# Patient Record
Sex: MALE | Race: WHITE | NOT HISPANIC OR LATINO | Employment: OTHER | RURAL
[De-identification: names, ages, dates, MRNs, and addresses within clinical notes are randomized per-mention and may not be internally consistent; named-entity substitution may affect disease eponyms.]

---

## 2017-04-18 RX ORDER — TRAZODONE HYDROCHLORIDE 50 MG/1
50 TABLET ORAL NIGHTLY
Qty: 180 TABLET | Refills: 1 | Status: SHIPPED | OUTPATIENT
Start: 2017-04-18 | End: 2017-10-09 | Stop reason: SDUPTHER

## 2017-05-05 RX ORDER — ATORVASTATIN CALCIUM 40 MG/1
40 TABLET, FILM COATED ORAL DAILY
Qty: 90 TABLET | Refills: 1 | Status: SHIPPED | OUTPATIENT
Start: 2017-05-05 | End: 2017-10-26 | Stop reason: SDUPTHER

## 2017-05-19 ENCOUNTER — LAB (OUTPATIENT)
Dept: FAMILY MEDICINE CLINIC | Facility: CLINIC | Age: 57
End: 2017-05-19

## 2017-05-19 DIAGNOSIS — E78.2 MIXED HYPERLIPIDEMIA: Primary | ICD-10-CM

## 2017-05-20 LAB
ALBUMIN SERPL-MCNC: 4.6 G/DL (ref 3.5–5.5)
ALBUMIN/GLOB SERPL: 2 {RATIO} (ref 1.2–2.2)
ALP SERPL-CCNC: 79 IU/L (ref 39–117)
ALT SERPL-CCNC: 22 IU/L (ref 0–44)
AST SERPL-CCNC: 30 IU/L (ref 0–40)
BILIRUB SERPL-MCNC: 0.8 MG/DL (ref 0–1.2)
BUN SERPL-MCNC: 16 MG/DL (ref 6–24)
BUN/CREAT SERPL: 18 (ref 9–20)
CALCIUM SERPL-MCNC: 8.9 MG/DL (ref 8.7–10.2)
CHLORIDE SERPL-SCNC: 102 MMOL/L (ref 96–106)
CHOLEST SERPL-MCNC: 122 MG/DL (ref 100–199)
CO2 SERPL-SCNC: 21 MMOL/L (ref 18–29)
CREAT SERPL-MCNC: 0.91 MG/DL (ref 0.76–1.27)
GLOBULIN SER CALC-MCNC: 2.3 G/DL (ref 1.5–4.5)
GLUCOSE SERPL-MCNC: 88 MG/DL (ref 65–99)
HDLC SERPL-MCNC: 32 MG/DL
LDLC SERPL CALC-MCNC: 70 MG/DL (ref 0–99)
Lab: NORMAL
POTASSIUM SERPL-SCNC: 4.4 MMOL/L (ref 3.5–5.2)
PROT SERPL-MCNC: 6.9 G/DL (ref 6–8.5)
SODIUM SERPL-SCNC: 142 MMOL/L (ref 134–144)
TRIGL SERPL-MCNC: 102 MG/DL (ref 0–149)
VLDLC SERPL CALC-MCNC: 20 MG/DL (ref 5–40)

## 2017-06-19 RX ORDER — SILDENAFIL CITRATE 100 MG
TABLET ORAL
Qty: 6 TABLET | Refills: 3 | Status: SHIPPED | OUTPATIENT
Start: 2017-06-19

## 2017-06-23 RX ORDER — NITROGLYCERIN 0.4 MG/1
TABLET SUBLINGUAL
Qty: 25 TABLET | Refills: 2 | Status: SHIPPED | OUTPATIENT
Start: 2017-06-23 | End: 2018-12-26 | Stop reason: SDUPTHER

## 2017-07-31 PROBLEM — I25.10 CAD IN NATIVE ARTERY: Status: ACTIVE | Noted: 2017-07-31

## 2017-07-31 PROBLEM — E78.5 HYPERLIPIDEMIA, MILD: Status: ACTIVE | Noted: 2017-07-31

## 2017-07-31 PROBLEM — I10 BENIGN ESSENTIAL HYPERTENSION: Status: ACTIVE | Noted: 2017-07-31

## 2017-07-31 PROBLEM — Z98.61 HISTORY OF PTCA: Status: ACTIVE | Noted: 2017-07-31

## 2017-07-31 PROBLEM — F17.200 TOBACCO USE DISORDER: Status: ACTIVE | Noted: 2017-07-31

## 2017-07-31 PROBLEM — I25.2 H/O ACUTE MYOCARDIAL INFARCTION: Status: ACTIVE | Noted: 2017-07-31

## 2017-07-31 RX ORDER — CAPTOPRIL 25 MG/1
25 TABLET ORAL 2 TIMES DAILY
COMMUNITY
End: 2018-03-09 | Stop reason: SDUPTHER

## 2017-07-31 RX ORDER — ASPIRIN 81 MG/1
81 TABLET ORAL DAILY
COMMUNITY

## 2017-07-31 RX ORDER — ASCORBIC ACID 1000 MG
40 TABLET ORAL DAILY
COMMUNITY

## 2017-08-09 ENCOUNTER — OFFICE VISIT (OUTPATIENT)
Dept: CARDIOLOGY | Facility: CLINIC | Age: 57
End: 2017-08-09

## 2017-08-09 VITALS
WEIGHT: 217 LBS | HEART RATE: 63 BPM | HEIGHT: 74 IN | DIASTOLIC BLOOD PRESSURE: 70 MMHG | SYSTOLIC BLOOD PRESSURE: 118 MMHG | BODY MASS INDEX: 27.85 KG/M2

## 2017-08-09 DIAGNOSIS — E78.5 HYPERLIPIDEMIA, MILD: ICD-10-CM

## 2017-08-09 DIAGNOSIS — I25.10 CAD IN NATIVE ARTERY: Primary | ICD-10-CM

## 2017-08-09 DIAGNOSIS — I10 BENIGN ESSENTIAL HYPERTENSION: ICD-10-CM

## 2017-08-09 PROCEDURE — 99213 OFFICE O/P EST LOW 20 MIN: CPT | Performed by: INTERNAL MEDICINE

## 2017-08-09 PROCEDURE — 93000 ELECTROCARDIOGRAM COMPLETE: CPT | Performed by: INTERNAL MEDICINE
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
## 2017-10-09 RX ORDER — TRAZODONE HYDROCHLORIDE 50 MG/1
TABLET ORAL
Qty: 180 TABLET | Refills: 1 | Status: SHIPPED | OUTPATIENT
Start: 2017-10-09 | End: 2018-06-24 | Stop reason: SDUPTHER

## 2017-10-17 ENCOUNTER — LAB (OUTPATIENT)
Dept: FAMILY MEDICINE CLINIC | Facility: CLINIC | Age: 57
End: 2017-10-17

## 2017-10-17 DIAGNOSIS — Z00.00 ROUTINE GENERAL MEDICAL EXAMINATION AT A HEALTH CARE FACILITY: Primary | ICD-10-CM

## 2017-10-17 DIAGNOSIS — E55.9 VITAMIN D INSUFFICIENCY: ICD-10-CM

## 2017-10-18 ENCOUNTER — OFFICE VISIT (OUTPATIENT)
Dept: FAMILY MEDICINE CLINIC | Facility: CLINIC | Age: 57
End: 2017-10-18

## 2017-10-18 VITALS
BODY MASS INDEX: 28.23 KG/M2 | DIASTOLIC BLOOD PRESSURE: 68 MMHG | SYSTOLIC BLOOD PRESSURE: 110 MMHG | HEIGHT: 74 IN | OXYGEN SATURATION: 95 % | WEIGHT: 220 LBS | HEART RATE: 67 BPM | TEMPERATURE: 98.6 F

## 2017-10-18 DIAGNOSIS — Z00.00 ANNUAL PHYSICAL EXAM: Primary | ICD-10-CM

## 2017-10-18 DIAGNOSIS — Z23 NEED FOR INFLUENZA VACCINATION: ICD-10-CM

## 2017-10-18 DIAGNOSIS — Z12.12 SCREENING FOR MALIGNANT NEOPLASM OF THE RECTUM: ICD-10-CM

## 2017-10-18 DIAGNOSIS — Z23 NEED FOR PROPHYLACTIC VACCINATION WITH COMBINED DIPHTHERIA-TETANUS-PERTUSSIS (DTP) VACCINE: ICD-10-CM

## 2017-10-18 LAB
25(OH)D3+25(OH)D2 SERPL-MCNC: 36.6 NG/ML (ref 30–100)
ALBUMIN SERPL-MCNC: 4.7 G/DL (ref 3.5–5)
ALBUMIN/GLOB SERPL: 1.8 G/DL (ref 1.1–2.5)
ALP SERPL-CCNC: 95 U/L (ref 24–120)
ALT SERPL-CCNC: 51 U/L (ref 0–54)
AST SERPL-CCNC: 38 U/L (ref 7–45)
BASOPHILS # BLD AUTO: 0.01 10*3/MM3 (ref 0–0.2)
BASOPHILS NFR BLD AUTO: 0.2 % (ref 0–2)
BILIRUB BLD-MCNC: NEGATIVE MG/DL
BILIRUB SERPL-MCNC: 0.8 MG/DL (ref 0.1–1)
BUN SERPL-MCNC: 16 MG/DL (ref 5–21)
BUN/CREAT SERPL: 15.1 (ref 7–25)
CALCIUM SERPL-MCNC: 9.4 MG/DL (ref 8.4–10.4)
CHLORIDE SERPL-SCNC: 103 MMOL/L (ref 98–110)
CHOLEST SERPL-MCNC: 151 MG/DL (ref 130–200)
CLARITY, POC: CLEAR
CO2 SERPL-SCNC: 29 MMOL/L (ref 24–31)
COLOR UR: YELLOW
CREAT SERPL-MCNC: 1.06 MG/DL (ref 0.5–1.4)
EOSINOPHIL # BLD AUTO: 0.09 10*3/MM3 (ref 0–0.7)
EOSINOPHIL NFR BLD AUTO: 1.4 % (ref 0–4)
ERYTHROCYTE [DISTWIDTH] IN BLOOD BY AUTOMATED COUNT: 13.7 % (ref 12–15)
GLOBULIN SER CALC-MCNC: 2.6 GM/DL
GLUCOSE SERPL-MCNC: 90 MG/DL (ref 70–100)
GLUCOSE UR STRIP-MCNC: NEGATIVE MG/DL
HCT VFR BLD AUTO: 46.3 % (ref 40–52)
HCV AB S/CO SERPL IA: <0.1 S/CO RATIO (ref 0–0.9)
HDLC SERPL-MCNC: 33 MG/DL
HGB BLD-MCNC: 15.4 G/DL (ref 14–18)
IMM GRANULOCYTES # BLD: 0.03 10*3/MM3 (ref 0–0.03)
IMM GRANULOCYTES NFR BLD: 0.5 % (ref 0–5)
KETONES UR QL: NEGATIVE
LDLC SERPL CALC-MCNC: 81 MG/DL (ref 0–99)
LEUKOCYTE EST, POC: NEGATIVE
LYMPHOCYTES # BLD AUTO: 1.53 10*3/MM3 (ref 0.72–4.86)
LYMPHOCYTES NFR BLD AUTO: 23.8 % (ref 15–45)
MCH RBC QN AUTO: 30.7 PG (ref 28–32)
MCHC RBC AUTO-ENTMCNC: 33.3 G/DL (ref 33–36)
MCV RBC AUTO: 92.4 FL (ref 82–95)
MONOCYTES # BLD AUTO: 0.58 10*3/MM3 (ref 0.19–1.3)
MONOCYTES NFR BLD AUTO: 9 % (ref 4–12)
NEUTROPHILS # BLD AUTO: 4.2 10*3/MM3 (ref 1.87–8.4)
NEUTROPHILS NFR BLD AUTO: 65.1 % (ref 39–78)
NITRITE UR-MCNC: NEGATIVE MG/ML
PH UR: 5 [PH] (ref 5–8)
PLATELET # BLD AUTO: 152 10*3/MM3 (ref 130–400)
POTASSIUM SERPL-SCNC: 5.2 MMOL/L (ref 3.5–5.3)
PROT SERPL-MCNC: 7.3 G/DL (ref 6.3–8.7)
PROT UR STRIP-MCNC: NEGATIVE MG/DL
PSA SERPL-MCNC: 0.63 NG/ML (ref 0–4)
RBC # BLD AUTO: 5.01 10*6/MM3 (ref 4.8–5.9)
RBC # UR STRIP: NEGATIVE /UL
SODIUM SERPL-SCNC: 142 MMOL/L (ref 135–145)
SP GR UR: 1.02 (ref 1–1.03)
T4 FREE SERPL-MCNC: 0.8 NG/DL (ref 0.78–2.19)
TRIGL SERPL-MCNC: 187 MG/DL (ref 0–149)
TSH SERPL DL<=0.005 MIU/L-ACNC: 2.65 MIU/ML (ref 0.47–4.68)
UROBILINOGEN UR QL: NORMAL
VLDLC SERPL CALC-MCNC: 37.4 MG/DL
WBC # BLD AUTO: 6.44 10*3/MM3 (ref 4.8–10.8)

## 2017-10-18 PROCEDURE — 90630 INFLUENZA VAC INTRADERMAL QUADRIVALENT: CPT | Performed by: FAMILY MEDICINE

## 2017-10-18 PROCEDURE — 81003 URINALYSIS AUTO W/O SCOPE: CPT | Performed by: FAMILY MEDICINE

## 2017-10-18 PROCEDURE — 90471 IMMUNIZATION ADMIN: CPT | Performed by: FAMILY MEDICINE

## 2017-10-18 PROCEDURE — 90715 TDAP VACCINE 7 YRS/> IM: CPT | Performed by: FAMILY MEDICINE

## 2017-10-18 PROCEDURE — 99396 PREV VISIT EST AGE 40-64: CPT | Performed by: FAMILY MEDICINE

## 2017-10-18 PROCEDURE — 96372 THER/PROPH/DIAG INJ SC/IM: CPT | Performed by: FAMILY MEDICINE

## 2017-10-18 NOTE — PATIENT INSTRUCTIONS

## 2017-10-18 NOTE — PROGRESS NOTES
Subjective   Gregorio Callahan is a 57 y.o. male.     Hypertension   This is a chronic problem. The current episode started more than 1 year ago. The problem is unchanged. The problem is controlled. Pertinent negatives include no chest pain. There are no associated agents to hypertension. Risk factors for coronary artery disease include dyslipidemia, male gender and sedentary lifestyle. Past treatments include ACE inhibitors and beta blockers. The current treatment provides significant improvement. Compliance problems include diet.  Hypertensive end-organ damage includes CAD/MI.        The following portions of the patient's history were reviewed and updated as appropriate: allergies, current medications, past family history, past medical history, past social history, past surgical history and problem list.    Review of Systems   Cardiovascular: Negative for chest pain and leg swelling.   Genitourinary: Negative for difficulty urinating.        Erectile dysfunction   All other systems reviewed and are negative.      Objective   Physical Exam   Constitutional: He is oriented to person, place, and time. He appears well-developed and well-nourished.   HENT:   Right Ear: External ear normal.   Left Ear: External ear normal.   Mouth/Throat: Oropharynx is clear and moist.   Eyes: Conjunctivae and EOM are normal. Pupils are equal, round, and reactive to light.   Neck: No thyromegaly present.   The carotid pulses are good   Cardiovascular: Normal rate and regular rhythm.    Pulmonary/Chest: Effort normal and breath sounds normal.   Abdominal: Soft. Bowel sounds are normal. He exhibits no mass.   Genitourinary: Rectum normal, prostate normal and penis normal.   Genitourinary Comments: Testicles normal no inguinal hernias or adenopathy-prostate normal no nodules   Musculoskeletal: He exhibits no edema.   Lymphadenopathy:     He has no cervical adenopathy.   Neurological: He is alert and oriented to person, place, and time.   Skin:  Skin is warm and dry.   Psychiatric: He has a normal mood and affect. His behavior is normal. Judgment and thought content normal.   Nursing note and vitals reviewed.      Assessment/Plan   Problems Addressed this Visit     None      Visit Diagnoses     Annual physical exam    -  Primary    Need for prophylactic vaccination with combined diphtheria-tetanus-pertussis (DTP) vaccine        Relevant Orders    Tdap Vaccine Greater Than or Equal To 8yo IM    Screening for malignant neoplasm of the rectum        Relevant Orders    POC Occult Blood X 3, Stool            Plan-above

## 2017-10-26 RX ORDER — ATORVASTATIN CALCIUM 40 MG/1
TABLET, FILM COATED ORAL
Qty: 90 TABLET | Refills: 1 | Status: SHIPPED | OUTPATIENT
Start: 2017-10-26 | End: 2018-04-19 | Stop reason: SDUPTHER

## 2017-11-29 ENCOUNTER — OFFICE VISIT (OUTPATIENT)
Dept: FAMILY MEDICINE CLINIC | Facility: CLINIC | Age: 57
End: 2017-11-29

## 2017-11-29 VITALS
DIASTOLIC BLOOD PRESSURE: 64 MMHG | HEIGHT: 74 IN | HEART RATE: 75 BPM | TEMPERATURE: 98.4 F | OXYGEN SATURATION: 96 % | BODY MASS INDEX: 28.88 KG/M2 | WEIGHT: 225 LBS | SYSTOLIC BLOOD PRESSURE: 116 MMHG

## 2017-11-29 DIAGNOSIS — R07.89 OTHER CHEST PAIN: Primary | ICD-10-CM

## 2017-11-29 DIAGNOSIS — R10.12 LUQ ABDOMINAL PAIN: ICD-10-CM

## 2017-11-29 LAB
BILIRUB BLD-MCNC: NEGATIVE MG/DL
CLARITY, POC: CLEAR
COLOR UR: YELLOW
GLUCOSE UR STRIP-MCNC: NEGATIVE MG/DL
KETONES UR QL: NEGATIVE
LEUKOCYTE EST, POC: NEGATIVE
NITRITE UR-MCNC: NEGATIVE MG/ML
PH UR: 5.5 [PH] (ref 5–8)
PROT UR STRIP-MCNC: NEGATIVE MG/DL
RBC # UR STRIP: NEGATIVE /UL
SP GR UR: 1.02 (ref 1–1.03)
UROBILINOGEN UR QL: NORMAL

## 2017-11-29 PROCEDURE — 81003 URINALYSIS AUTO W/O SCOPE: CPT | Performed by: FAMILY MEDICINE

## 2017-11-29 PROCEDURE — 99213 OFFICE O/P EST LOW 20 MIN: CPT | Performed by: FAMILY MEDICINE

## 2017-11-29 RX ORDER — HYDROCODONE BITARTRATE AND ACETAMINOPHEN 7.5; 325 MG/1; MG/1
1 TABLET ORAL EVERY 6 HOURS PRN
COMMUNITY
End: 2017-12-06

## 2017-11-29 RX ORDER — IBUPROFEN 200 MG
200 TABLET ORAL EVERY 6 HOURS PRN
COMMUNITY
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
## 2017-11-30 DIAGNOSIS — R10.12 LUQ ABDOMINAL PAIN: ICD-10-CM

## 2017-11-30 DIAGNOSIS — R07.89 OTHER CHEST PAIN: ICD-10-CM

## 2017-12-06 ENCOUNTER — OFFICE VISIT (OUTPATIENT)
Dept: FAMILY MEDICINE CLINIC | Facility: CLINIC | Age: 57
End: 2017-12-06

## 2017-12-06 VITALS
SYSTOLIC BLOOD PRESSURE: 118 MMHG | HEIGHT: 74 IN | OXYGEN SATURATION: 95 % | WEIGHT: 227.2 LBS | DIASTOLIC BLOOD PRESSURE: 76 MMHG | HEART RATE: 77 BPM | BODY MASS INDEX: 29.16 KG/M2 | TEMPERATURE: 98.2 F

## 2017-12-06 DIAGNOSIS — R52 PAIN: Primary | ICD-10-CM

## 2017-12-06 PROCEDURE — 99213 OFFICE O/P EST LOW 20 MIN: CPT | Performed by: FAMILY MEDICINE
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
## 2017-12-21 RX ORDER — METOPROLOL TARTRATE 50 MG/1
TABLET, FILM COATED ORAL
Qty: 90 TABLET | Refills: 3 | Status: SHIPPED | OUTPATIENT
Start: 2017-12-21 | End: 2019-01-14 | Stop reason: SDUPTHER

## 2018-01-05 ENCOUNTER — OFFICE VISIT (OUTPATIENT)
Dept: FAMILY MEDICINE CLINIC | Facility: CLINIC | Age: 58
End: 2018-01-05

## 2018-01-05 VITALS
BODY MASS INDEX: 28.21 KG/M2 | DIASTOLIC BLOOD PRESSURE: 70 MMHG | HEART RATE: 77 BPM | SYSTOLIC BLOOD PRESSURE: 112 MMHG | WEIGHT: 219.8 LBS | TEMPERATURE: 98 F | HEIGHT: 74 IN | OXYGEN SATURATION: 97 %

## 2018-01-05 DIAGNOSIS — J40 BRONCHITIS: Primary | ICD-10-CM

## 2018-01-05 PROCEDURE — 99213 OFFICE O/P EST LOW 20 MIN: CPT | Performed by: FAMILY MEDICINE

## 2018-01-05 RX ORDER — AZITHROMYCIN 250 MG/1
TABLET, FILM COATED ORAL
Qty: 6 TABLET | Refills: 0 | Status: SHIPPED | OUTPATIENT
Start: 2018-01-05 | End: 2018-01-26

## 2018-01-05 RX ORDER — NEOMYCIN SULFATE, POLYMYXIN B SULFATE AND HYDROCORTISONE 10; 3.5; 1 MG/ML; MG/ML; [USP'U]/ML
3 SUSPENSION/ DROPS AURICULAR (OTIC) 4 TIMES DAILY
Qty: 10 ML | Refills: 0 | Status: SHIPPED | OUTPATIENT
Start: 2018-01-05 | End: 2018-01-26

## 2018-01-05 NOTE — PROGRESS NOTES
Subjective   Gregorio Callahan is a 57 y.o. male.     Cough   This is a new problem. The current episode started 1 to 4 weeks ago. The problem has been unchanged. The problem occurs hourly. The cough is productive of purulent sputum. Associated symptoms include chest pain. Pertinent negatives include no shortness of breath. Associated symptoms comments: Flu 1 week ago. Nothing aggravates the symptoms. Risk factors: Recent rib fractures.       The following portions of the patient's history were reviewed and updated as appropriate: allergies, current medications, past family history, past medical history, past social history, past surgical history and problem list.    Review of Systems   HENT:        Right external otitis   Respiratory: Positive for cough. Negative for shortness of breath.    Cardiovascular: Positive for chest pain.        Rib fractures are improving-pain wise       Objective   Physical Exam   Constitutional: He is oriented to person, place, and time. He appears well-developed and well-nourished.   HENT:   Left Ear: External ear normal.   Right external otitis   Cardiovascular: Normal rate and regular rhythm.    Pulmonary/Chest: Effort normal. He has wheezes. He has rales.   Lymphadenopathy:     He has no cervical adenopathy.   Neurological: He is alert and oriented to person, place, and time.   Psychiatric: He has a normal mood and affect. His behavior is normal.   Nursing note and vitals reviewed.      Assessment/Plan   Gregorio was seen today for follow-up and cough.    Diagnoses and all orders for this visit:    Bronchitis    Other orders  -     azithromycin (ZITHROMAX Z-STEPHEN) 250 MG tablet; Take 2 tablets the first day, then 1 tablet daily for 4 days.  -     neomycin-polymyxin-hydrocortisone (CORTISPORIN) 3.5-40779-2 otic suspension; Administer 3 drops into both ears 4 (Four) Times a Day.           Plan above-return 3 weeks-add Mucinex to treatment

## 2018-01-26 ENCOUNTER — OFFICE VISIT (OUTPATIENT)
Dept: FAMILY MEDICINE CLINIC | Facility: CLINIC | Age: 58
End: 2018-01-26

## 2018-01-26 VITALS
WEIGHT: 222.6 LBS | BODY MASS INDEX: 28.57 KG/M2 | SYSTOLIC BLOOD PRESSURE: 110 MMHG | HEIGHT: 74 IN | TEMPERATURE: 98.7 F | OXYGEN SATURATION: 96 % | HEART RATE: 72 BPM | DIASTOLIC BLOOD PRESSURE: 60 MMHG

## 2018-01-26 DIAGNOSIS — H61.20 WAX IN EAR: ICD-10-CM

## 2018-01-26 DIAGNOSIS — R07.81 RIB PAIN: Primary | ICD-10-CM

## 2018-01-26 PROCEDURE — 69209 REMOVE IMPACTED EAR WAX UNI: CPT | Performed by: FAMILY MEDICINE

## 2018-01-26 PROCEDURE — 99213 OFFICE O/P EST LOW 20 MIN: CPT | Performed by: FAMILY MEDICINE

## 2018-01-26 NOTE — PROGRESS NOTES
Procedure   Ear Cerumen Removal Instrumentation  Date/Time: 1/26/2018 8:54 AM  Performed by: ARCHIE CLEMENT  Authorized by: ARCHIE CLEMENT     Anesthesia:  Local Anesthetic: none  Location details: right ear  Procedure type: irrigation    Sedation:  Patient sedated: no  Patient tolerance: Patient tolerated the procedure well with no immediate complications  Comments: MA irrigated right ear for persistent debris-no injury to eardrum
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
## 2018-03-09 RX ORDER — CAPTOPRIL 25 MG/1
25 TABLET ORAL 2 TIMES DAILY
Qty: 180 TABLET | Refills: 0 | Status: SHIPPED | OUTPATIENT
Start: 2018-03-09 | End: 2018-06-04 | Stop reason: SDUPTHER

## 2018-04-19 RX ORDER — ATORVASTATIN CALCIUM 40 MG/1
TABLET, FILM COATED ORAL
Qty: 90 TABLET | Refills: 1 | Status: SHIPPED | OUTPATIENT
Start: 2018-04-19 | End: 2018-10-16 | Stop reason: SDUPTHER

## 2018-06-05 RX ORDER — CAPTOPRIL 25 MG/1
TABLET ORAL
Qty: 180 TABLET | Refills: 0 | Status: SHIPPED | OUTPATIENT
Start: 2018-06-05 | End: 2018-09-15 | Stop reason: SDUPTHER

## 2018-06-25 RX ORDER — TRAZODONE HYDROCHLORIDE 50 MG/1
TABLET ORAL
Qty: 180 TABLET | Refills: 1 | Status: SHIPPED | OUTPATIENT
Start: 2018-06-25 | End: 2019-05-17 | Stop reason: SDUPTHER

## 2018-08-31 ENCOUNTER — OFFICE VISIT (OUTPATIENT)
Dept: CARDIOLOGY | Facility: CLINIC | Age: 58
End: 2018-08-31

## 2018-08-31 VITALS
HEART RATE: 60 BPM | BODY MASS INDEX: 28.06 KG/M2 | DIASTOLIC BLOOD PRESSURE: 70 MMHG | WEIGHT: 218.6 LBS | HEIGHT: 74 IN | OXYGEN SATURATION: 100 % | SYSTOLIC BLOOD PRESSURE: 112 MMHG

## 2018-08-31 DIAGNOSIS — I10 BENIGN ESSENTIAL HYPERTENSION: ICD-10-CM

## 2018-08-31 DIAGNOSIS — R06.09 EXERTIONAL DYSPNEA: ICD-10-CM

## 2018-08-31 DIAGNOSIS — E78.5 HYPERLIPIDEMIA, MILD: ICD-10-CM

## 2018-08-31 DIAGNOSIS — I25.10 CAD IN NATIVE ARTERY: Primary | ICD-10-CM

## 2018-08-31 PROBLEM — F17.200 TOBACCO USE DISORDER: Status: RESOLVED | Noted: 2017-07-31 | Resolved: 2018-08-31

## 2018-08-31 PROCEDURE — 99214 OFFICE O/P EST MOD 30 MIN: CPT | Performed by: PHYSICIAN ASSISTANT

## 2018-08-31 PROCEDURE — 93000 ELECTROCARDIOGRAM COMPLETE: CPT | Performed by: PHYSICIAN ASSISTANT

## 2018-09-07 ENCOUNTER — APPOINTMENT (OUTPATIENT)
Dept: CARDIOLOGY | Facility: HOSPITAL | Age: 58
End: 2018-09-07

## 2018-09-14 ENCOUNTER — HOSPITAL ENCOUNTER (OUTPATIENT)
Dept: CARDIOLOGY | Facility: HOSPITAL | Age: 58
Discharge: HOME | End: 2018-09-14
Admitting: PHYSICIAN ASSISTANT

## 2018-09-14 VITALS — SYSTOLIC BLOOD PRESSURE: 113 MMHG | DIASTOLIC BLOOD PRESSURE: 70 MMHG | HEART RATE: 60 BPM

## 2018-09-14 DIAGNOSIS — R06.09 EXERTIONAL DYSPNEA: ICD-10-CM

## 2018-09-14 DIAGNOSIS — I25.10 CAD IN NATIVE ARTERY: ICD-10-CM

## 2018-09-14 PROCEDURE — 25010000002 PERFLUTREN 6.52 MG/ML SUSPENSION: Performed by: INTERNAL MEDICINE

## 2018-09-14 PROCEDURE — 93352 ADMIN ECG CONTRAST AGENT: CPT | Performed by: INTERNAL MEDICINE

## 2018-09-14 PROCEDURE — 93350 STRESS TTE ONLY: CPT | Performed by: INTERNAL MEDICINE

## 2018-09-14 PROCEDURE — 93350 STRESS TTE ONLY: CPT

## 2018-09-14 PROCEDURE — 93018 CV STRESS TEST I&R ONLY: CPT | Performed by: INTERNAL MEDICINE

## 2018-09-14 PROCEDURE — 93017 CV STRESS TEST TRACING ONLY: CPT

## 2018-09-14 RX ADMIN — PERFLUTREN 8.48 MG: 6.52 INJECTION, SUSPENSION INTRAVENOUS at 09:54

## 2018-09-17 ENCOUNTER — TELEPHONE (OUTPATIENT)
Dept: CARDIOLOGY | Facility: CLINIC | Age: 58
End: 2018-09-17

## 2018-09-17 LAB
BH CV STRESS BP STAGE 1: NORMAL
BH CV STRESS BP STAGE 2: NORMAL
BH CV STRESS BP STAGE 3: NORMAL
BH CV STRESS BP STAGE 4: NORMAL
BH CV STRESS DURATION MIN STAGE 1: 3
BH CV STRESS DURATION MIN STAGE 2: 3
BH CV STRESS DURATION MIN STAGE 3: 3
BH CV STRESS DURATION MIN STAGE 4: 0
BH CV STRESS DURATION SEC STAGE 1: 0
BH CV STRESS DURATION SEC STAGE 2: 0
BH CV STRESS DURATION SEC STAGE 3: 0
BH CV STRESS DURATION SEC STAGE 4: 30
BH CV STRESS GRADE STAGE 1: 10
BH CV STRESS GRADE STAGE 2: 12
BH CV STRESS GRADE STAGE 3: 14
BH CV STRESS GRADE STAGE 4: 16
BH CV STRESS HR STAGE 1: 93
BH CV STRESS HR STAGE 2: 109
BH CV STRESS HR STAGE 3: 130
BH CV STRESS HR STAGE 4: 150
BH CV STRESS METS STAGE 1: 5
BH CV STRESS METS STAGE 2: 7.5
BH CV STRESS METS STAGE 3: 10
BH CV STRESS METS STAGE 4: 13.5
BH CV STRESS PROTOCOL 1: NORMAL
BH CV STRESS RECOVERY BP: NORMAL MMHG
BH CV STRESS RECOVERY HR: 84 BPM
BH CV STRESS SPEED STAGE 1: 1.7
BH CV STRESS SPEED STAGE 2: 2.5
BH CV STRESS SPEED STAGE 3: 3.4
BH CV STRESS SPEED STAGE 4: 4.2
BH CV STRESS STAGE 1: 1
BH CV STRESS STAGE 2: 2
BH CV STRESS STAGE 3: 3
BH CV STRESS STAGE 4: 4
MAXIMAL PREDICTED HEART RATE: 163 BPM
PERCENT MAX PREDICTED HR: 92.02 %
STRESS BASELINE BP: NORMAL MMHG
STRESS BASELINE HR: 60 BPM
STRESS PERCENT HR: 108 %
STRESS POST ESTIMATED WORKLOAD: 13.5 METS
STRESS POST EXERCISE DUR MIN: 9 MIN
STRESS POST EXERCISE DUR SEC: 30 SEC
STRESS POST PEAK BP: NORMAL MMHG
STRESS POST PEAK HR: 150 BPM
STRESS TARGET HR: 139 BPM

## 2018-09-17 RX ORDER — CAPTOPRIL 25 MG/1
TABLET ORAL
Qty: 180 TABLET | Refills: 0 | Status: SHIPPED | OUTPATIENT
Start: 2018-09-17 | End: 2018-12-20 | Stop reason: SDUPTHER

## 2018-09-17 NOTE — TELEPHONE ENCOUNTER
----- Message from Marjorie Moore PA-C sent at 9/17/2018 12:20 PM CDT -----  Please tell him his stress test was low risk for ischemia or a blockage.     Thanks  Marjorie

## 2018-10-16 RX ORDER — ATORVASTATIN CALCIUM 40 MG/1
TABLET, FILM COATED ORAL
Qty: 90 TABLET | Refills: 0 | Status: SHIPPED | OUTPATIENT
Start: 2018-10-16 | End: 2019-01-13 | Stop reason: SDUPTHER

## 2018-10-23 ENCOUNTER — OFFICE VISIT (OUTPATIENT)
Dept: FAMILY MEDICINE CLINIC | Facility: CLINIC | Age: 58
End: 2018-10-23

## 2018-10-23 VITALS
HEART RATE: 64 BPM | SYSTOLIC BLOOD PRESSURE: 123 MMHG | HEIGHT: 72 IN | TEMPERATURE: 98.5 F | BODY MASS INDEX: 29.85 KG/M2 | OXYGEN SATURATION: 97 % | DIASTOLIC BLOOD PRESSURE: 79 MMHG | WEIGHT: 220.4 LBS

## 2018-10-23 DIAGNOSIS — Z23 NEED FOR PROPHYLACTIC VACCINATION AGAINST STREPTOCOCCUS PNEUMONIAE (PNEUMOCOCCUS): ICD-10-CM

## 2018-10-23 DIAGNOSIS — E55.9 VITAMIN D DEFICIENCY: ICD-10-CM

## 2018-10-23 DIAGNOSIS — Z12.12 SCREENING FOR COLORECTAL CANCER: ICD-10-CM

## 2018-10-23 DIAGNOSIS — I10 ESSENTIAL HYPERTENSION: ICD-10-CM

## 2018-10-23 DIAGNOSIS — Z12.11 SCREENING FOR COLORECTAL CANCER: ICD-10-CM

## 2018-10-23 DIAGNOSIS — Z23 NEED FOR IMMUNIZATION AGAINST INFLUENZA: ICD-10-CM

## 2018-10-23 DIAGNOSIS — Z00.00 ANNUAL PHYSICAL EXAM: ICD-10-CM

## 2018-10-23 DIAGNOSIS — Z00.00 ROUTINE GENERAL MEDICAL EXAMINATION AT A HEALTH CARE FACILITY: Primary | ICD-10-CM

## 2018-10-23 LAB
25(OH)D3+25(OH)D2 SERPL-MCNC: 49.4 NG/ML (ref 30–100)
ALBUMIN SERPL-MCNC: 4.9 G/DL (ref 3.5–5)
ALBUMIN/GLOB SERPL: 1.8 G/DL (ref 1.1–2.5)
ALP SERPL-CCNC: 98 U/L (ref 24–120)
ALT SERPL-CCNC: 53 U/L (ref 0–54)
AST SERPL-CCNC: 47 U/L (ref 7–45)
BASOPHILS # BLD AUTO: 0.03 10*3/MM3 (ref 0–0.2)
BASOPHILS NFR BLD AUTO: 0.5 % (ref 0–2)
BILIRUB BLD-MCNC: NEGATIVE MG/DL
BILIRUB SERPL-MCNC: 0.9 MG/DL (ref 0.1–1)
BUN SERPL-MCNC: 13 MG/DL (ref 5–21)
BUN/CREAT SERPL: 12.5 (ref 7–25)
CALCIUM SERPL-MCNC: 10.1 MG/DL (ref 8.4–10.4)
CHLORIDE SERPL-SCNC: 104 MMOL/L (ref 98–110)
CHOLEST SERPL-MCNC: 146 MG/DL (ref 130–200)
CLARITY, POC: CLEAR
CO2 SERPL-SCNC: 26 MMOL/L (ref 24–31)
COLOR UR: YELLOW
CREAT SERPL-MCNC: 1.04 MG/DL (ref 0.5–1.4)
EOSINOPHIL # BLD AUTO: 0.06 10*3/MM3 (ref 0–0.7)
EOSINOPHIL NFR BLD AUTO: 1 % (ref 0–4)
ERYTHROCYTE [DISTWIDTH] IN BLOOD BY AUTOMATED COUNT: 13.3 % (ref 12–15)
GLOBULIN SER CALC-MCNC: 2.8 GM/DL
GLUCOSE SERPL-MCNC: 92 MG/DL (ref 70–100)
GLUCOSE UR STRIP-MCNC: NEGATIVE MG/DL
HCT VFR BLD AUTO: 46.3 % (ref 40–52)
HDLC SERPL-MCNC: 36 MG/DL
HGB BLD-MCNC: 15.3 G/DL (ref 14–18)
IMM GRANULOCYTES # BLD: 0.02 10*3/MM3 (ref 0–0.03)
IMM GRANULOCYTES NFR BLD: 0.3 % (ref 0–5)
KETONES UR QL: ABNORMAL
LDLC SERPL CALC-MCNC: 70 MG/DL (ref 0–99)
LEUKOCYTE EST, POC: NEGATIVE
LYMPHOCYTES # BLD AUTO: 1.39 10*3/MM3 (ref 0.72–4.86)
LYMPHOCYTES NFR BLD AUTO: 22.5 % (ref 15–45)
MCH RBC QN AUTO: 29.9 PG (ref 28–32)
MCHC RBC AUTO-ENTMCNC: 33 G/DL (ref 33–36)
MCV RBC AUTO: 90.6 FL (ref 82–95)
MONOCYTES # BLD AUTO: 0.62 10*3/MM3 (ref 0.19–1.3)
MONOCYTES NFR BLD AUTO: 10 % (ref 4–12)
NEUTROPHILS # BLD AUTO: 4.06 10*3/MM3 (ref 1.87–8.4)
NEUTROPHILS NFR BLD AUTO: 65.7 % (ref 39–78)
NITRITE UR-MCNC: NEGATIVE MG/ML
NRBC BLD AUTO-RTO: 0 /100 WBC (ref 0–0)
PH UR: 5.5 [PH] (ref 5–8)
PLATELET # BLD AUTO: 165 10*3/MM3 (ref 130–400)
POTASSIUM SERPL-SCNC: 4.7 MMOL/L (ref 3.5–5.3)
PROT SERPL-MCNC: 7.7 G/DL (ref 6.3–8.7)
PROT UR STRIP-MCNC: NEGATIVE MG/DL
PSA SERPL-MCNC: 0.79 NG/ML (ref 0–4)
RBC # BLD AUTO: 5.11 10*6/MM3 (ref 4.8–5.9)
RBC # UR STRIP: NEGATIVE /UL
SODIUM SERPL-SCNC: 144 MMOL/L (ref 135–145)
SP GR UR: 1.02 (ref 1–1.03)
T4 FREE SERPL-MCNC: 0.93 NG/DL (ref 0.78–2.19)
TRIGL SERPL-MCNC: 202 MG/DL (ref 0–149)
TSH SERPL DL<=0.005 MIU/L-ACNC: 2.45 MIU/ML (ref 0.47–4.68)
UROBILINOGEN UR QL: NORMAL
VLDLC SERPL CALC-MCNC: 40.4 MG/DL
WBC # BLD AUTO: 6.18 10*3/MM3 (ref 4.8–10.8)

## 2018-10-23 PROCEDURE — 81003 URINALYSIS AUTO W/O SCOPE: CPT | Performed by: FAMILY MEDICINE

## 2018-10-23 PROCEDURE — 90732 PPSV23 VACC 2 YRS+ SUBQ/IM: CPT | Performed by: FAMILY MEDICINE

## 2018-10-23 PROCEDURE — 90674 CCIIV4 VAC NO PRSV 0.5 ML IM: CPT | Performed by: FAMILY MEDICINE

## 2018-10-23 PROCEDURE — 99396 PREV VISIT EST AGE 40-64: CPT | Performed by: FAMILY MEDICINE

## 2018-10-23 PROCEDURE — 90471 IMMUNIZATION ADMIN: CPT | Performed by: FAMILY MEDICINE

## 2018-10-23 PROCEDURE — 90472 IMMUNIZATION ADMIN EACH ADD: CPT | Performed by: FAMILY MEDICINE

## 2018-10-23 RX ORDER — CITALOPRAM 20 MG/1
20 TABLET ORAL DAILY
Qty: 90 TABLET | Refills: 1 | Status: SHIPPED | OUTPATIENT
Start: 2018-10-23 | End: 2019-04-28 | Stop reason: SDUPTHER

## 2018-10-23 NOTE — PROGRESS NOTES
Subjective   Gregorio Callahan is a 58 y.o. male.     Hyperlipidemia   This is a chronic problem. The current episode started more than 1 year ago. The problem is controlled. Recent lipid tests were reviewed and are normal. There are no known factors aggravating his hyperlipidemia. Pertinent negatives include no chest pain. Current antihyperlipidemic treatment includes diet change and statins. Compliance problems include adherence to diet and adherence to exercise.  Risk factors for coronary artery disease include dyslipidemia, family history, hypertension, male sex and a sedentary lifestyle.        The following portions of the patient's history were reviewed and updated as appropriate: allergies, current medications, past family history, past medical history, past social history, past surgical history and problem list.    Review of Systems   Respiratory:        Wants to wait on low-dose CT scan   Cardiovascular: Negative for chest pain.        Stress test last month by cardiologist normal   Genitourinary: Negative for difficulty urinating.   Musculoskeletal: Negative for neck pain.   Psychiatric/Behavioral:        Grieving with loss of brother   All other systems reviewed and are negative.      Objective   Physical Exam   Constitutional: He is oriented to person, place, and time.   Overweight   HENT:   Right Ear: External ear normal.   Left Ear: External ear normal.   Mouth/Throat: Oropharynx is clear and moist.   Eyes: Pupils are equal, round, and reactive to light. EOM are normal.   Neck: No thyromegaly present.   Good carotid pulses   Cardiovascular: Normal rate and regular rhythm.    Pulmonary/Chest: Effort normal and breath sounds normal.   Abdominal: Soft. Bowel sounds are normal.   Genitourinary: Rectum normal, prostate normal and penis normal.   Genitourinary Comments: No testicular masses inguinal hernias or adenopathy-prostate no nodules   Musculoskeletal: He exhibits no edema.   Lymphadenopathy:     He has  no cervical adenopathy.   Neurological: He is alert and oriented to person, place, and time.   Skin: Skin is warm and dry. Capillary refill takes less than 2 seconds.   Psychiatric: His behavior is normal. Judgment and thought content normal.   Mood is down   Nursing note and vitals reviewed.      Assessment/Plan   Problems Addressed this Visit     None      Visit Diagnoses     Routine general medical examination at a health care facility    -  Primary    Relevant Orders    TSH    T4, free    CBC & Differential    Comprehensive Metabolic Panel    Lipid Panel    PSA Screen    Need for immunization against influenza        Relevant Orders    Flucelvax Quad=>4Years (PFS)    Need for prophylactic vaccination against Streptococcus pneumoniae (pneumococcus)        Relevant Orders    Pneumococcal Polysaccharide Vaccine 23-Valent Greater Than or Equal To 1yo Subcutaneous / IM    Screening for colorectal cancer        Relevant Orders    Cologuard - Stool, Per Rectum    Vitamin D deficiency        Relevant Orders    Vitamin D 25 Hydroxy    Essential hypertension        Relevant Orders    POC Urinalysis Dipstick, Automated (Completed)    Annual physical exam              Plan above-recommended low-dose CT will check with insurance

## 2018-10-26 ENCOUNTER — TELEPHONE (OUTPATIENT)
Dept: FAMILY MEDICINE CLINIC | Facility: CLINIC | Age: 58
End: 2018-10-26

## 2018-10-26 DIAGNOSIS — Z87.891 FORMER CIGARETTE SMOKER: ICD-10-CM

## 2018-10-26 DIAGNOSIS — Z12.2 ENCOUNTER FOR SCREENING FOR LUNG CANCER: Primary | ICD-10-CM

## 2018-11-06 ENCOUNTER — HOSPITAL ENCOUNTER (OUTPATIENT)
Dept: CT IMAGING | Facility: HOSPITAL | Age: 58
Discharge: HOME | End: 2018-11-06
Admitting: FAMILY MEDICINE

## 2018-11-06 DIAGNOSIS — Z87.891 FORMER CIGARETTE SMOKER: ICD-10-CM

## 2018-11-06 DIAGNOSIS — Z12.2 ENCOUNTER FOR SCREENING FOR LUNG CANCER: ICD-10-CM

## 2018-11-06 PROCEDURE — G0297 LDCT FOR LUNG CA SCREEN: HCPCS

## 2018-12-05 ENCOUNTER — OFFICE VISIT (OUTPATIENT)
Dept: CARDIOLOGY | Facility: CLINIC | Age: 58
End: 2018-12-05

## 2018-12-05 VITALS
SYSTOLIC BLOOD PRESSURE: 120 MMHG | HEIGHT: 73 IN | WEIGHT: 223 LBS | BODY MASS INDEX: 29.55 KG/M2 | HEART RATE: 61 BPM | DIASTOLIC BLOOD PRESSURE: 80 MMHG

## 2018-12-05 DIAGNOSIS — E78.5 HYPERLIPIDEMIA, MILD: ICD-10-CM

## 2018-12-05 DIAGNOSIS — I10 BENIGN ESSENTIAL HYPERTENSION: ICD-10-CM

## 2018-12-05 DIAGNOSIS — I25.10 CAD IN NATIVE ARTERY: Primary | ICD-10-CM

## 2018-12-05 PROCEDURE — 93000 ELECTROCARDIOGRAM COMPLETE: CPT | Performed by: INTERNAL MEDICINE

## 2018-12-05 PROCEDURE — 99213 OFFICE O/P EST LOW 20 MIN: CPT | Performed by: INTERNAL MEDICINE

## 2018-12-05 NOTE — PROGRESS NOTES
Subjective    Gregorio Callahan is a 58 y.o. male. FU of ihd and risks    History of Present Illness     IHD:  Is active without cp or decline in stamina. LOV 3 mo ago, noted lack of energy and had a SE to check and did well on that. That has resolved and he attributes it to the death of his brother. EKG today is still normal. Is compliant with meds but not with diet or exercise.    HTN:  Gets good clinic checks on current meds that are well tolerated. No light-headedness    HLD:  Recent lipid profile showed LDL=70 and MVK=228. Has been advised to limit sweets and may be helped by fishoil if that is insufficient.        The following portions of the patient's history were reviewed and updated as appropriate: allergies, current medications, past family history, past medical history, past social history, past surgical history and problem list.    Patient Active Problem List   Diagnosis   • Hyperlipidemia, mild   • Benign essential hypertension   • CAD in native artery   • H/O acute myocardial infarction   • History of PTCA       No Known Allergies    Family History   Problem Relation Age of Onset   • Heart disease Mother    • Heart attack Sister    • No Known Problems Father    • No Known Problems Maternal Grandmother    • No Known Problems Maternal Grandfather    • No Known Problems Paternal Grandmother    • No Known Problems Paternal Grandfather        Social History     Socioeconomic History   • Marital status:      Spouse name: Not on file   • Number of children: Not on file   • Years of education: Not on file   • Highest education level: Not on file   Social Needs   • Financial resource strain: Not on file   • Food insecurity - worry: Not on file   • Food insecurity - inability: Not on file   • Transportation needs - medical: Not on file   • Transportation needs - non-medical: Not on file   Occupational History   • Not on file   Tobacco Use   • Smoking status: Former Smoker     Packs/day: 3.00     Years: 30.00      Pack years: 90.00     Types: Cigarettes     Last attempt to quit: 2007     Years since quittin.9   • Smokeless tobacco: Never Used   • Tobacco comment: age quit smokin   Substance and Sexual Activity   • Alcohol use: Yes     Comment: Occasional alcohol use. Drinks beer.   • Drug use: No   • Sexual activity: Defer   Other Topics Concern   • Not on file   Social History Narrative   • Not on file         Current Outpatient Medications:   •  aspirin 81 MG EC tablet, Take 81 mg by mouth Daily., Disp: , Rfl:   •  atorvastatin (LIPITOR) 40 MG tablet, TAKE 1 TABLET BY MOUTH EVERY DAY, Disp: 90 tablet, Rfl: 0  •  captopril (CAPOTEN) 25 MG tablet, TAKE 1 TABLET BY MOUTH TWICE A DAY, Disp: 180 tablet, Rfl: 0  •  citalopram (CeleXA) 20 MG tablet, Take 1 tablet by mouth Daily., Disp: 90 tablet, Rfl: 1  •  Ginkgo Biloba (GINKOBA) 40 MG tablet, Take 40 mg by mouth Daily., Disp: , Rfl:   •  ibuprofen (ADVIL) 200 MG tablet, Take 200 mg by mouth Every 6 (Six) Hours As Needed for Mild Pain ., Disp: , Rfl:   •  metoprolol tartrate (LOPRESSOR) 50 MG tablet, TAKE 1/2 TABLET BY MOUTH TWO TIMES A DAY (Patient taking differently: TAKE 1/2 TABLET BY MOUTH ONCE A DAY), Disp: 90 tablet, Rfl: 3  •  NITROSTAT 0.4 MG SL tablet, TAKE 1 TABLET BY MOUTH EVERY 5 MINUTES UP TO 3 TIMES AS NEEDED FOR CHEST PAIN, Disp: 25 tablet, Rfl: 2  •  Nutritional Supplements (OSTEO ADVANCE) tablet, Take  by mouth 2 (Two) Times a Day., Disp: , Rfl:   •  Potassium 99 MG tablet, Take 99 mg by mouth Daily., Disp: , Rfl:   •  traZODone (DESYREL) 50 MG tablet, TAKE 1 OR 2 TABLETS BY MOUTH AT BEDTIME AS NEEDED, Disp: 180 tablet, Rfl: 1  •  VIAGRA 100 MG tablet, TAKE AS DIRECTED, Disp: 6 tablet, Rfl: 3    Past Surgical History:   Procedure Laterality Date   • CARDIAC CATHETERIZATION     • CORONARY STENT PLACEMENT      x1       Review of Systems   Constitutional: Negative for fatigue, fever and unexpected weight change.   Respiratory: Negative for apnea,  "chest tightness and shortness of breath.    Cardiovascular: Negative for chest pain, palpitations and leg swelling.   Gastrointestinal: Negative for abdominal pain.   Genitourinary: Negative for dysuria.   Musculoskeletal: Negative for myalgias.   Neurological: Negative for weakness and light-headedness.   Psychiatric/Behavioral: Positive for sleep disturbance.       /80   Pulse 61   Ht 185.4 cm (73\")   Wt 101 kg (223 lb)   BMI 29.42 kg/m²   Procedures    Objective   Physical Exam   Constitutional: He is oriented to person, place, and time. He appears well-developed and well-nourished. No distress.   HENT:   Head: Normocephalic.   Eyes: Pupils are equal, round, and reactive to light.   Neck: No thyromegaly present.   Cardiovascular: Normal rate, regular rhythm, normal heart sounds and intact distal pulses. Exam reveals no gallop and no friction rub.   No murmur heard.  Pulmonary/Chest: Effort normal and breath sounds normal. No stridor. No respiratory distress. He has no wheezes. He has no rales.   Abdominal: Soft. Bowel sounds are normal. He exhibits no distension and no mass. There is no tenderness. There is no guarding.   Musculoskeletal: He exhibits no edema or tenderness.   Neurological: He is alert and oriented to person, place, and time.   Skin: Skin is warm and dry. He is not diaphoretic.   Psychiatric: He has a normal mood and affect.       Assessment/Plan   Gregorio was seen today for coronary artery disease, hypertension and hyperlipidemia.    Diagnoses and all orders for this visit:    CAD in native artery  Comments:  NO ANGINA  AND GOOD STAMINA  Orders:  -     ECG 12 Lead    Benign essential hypertension  Comments:  CONTROLLED    Hyperlipidemia, mild  Comments:  CONTROLLED                 Return in about 1 year (around 12/5/2019).  No orders of the defined types were placed in this encounter.    "

## 2018-12-20 RX ORDER — CAPTOPRIL 25 MG/1
TABLET ORAL
Qty: 180 TABLET | Refills: 0 | Status: SHIPPED | OUTPATIENT
Start: 2018-12-20 | End: 2019-03-13 | Stop reason: SDUPTHER

## 2018-12-27 RX ORDER — NITROGLYCERIN 0.4 MG/1
TABLET SUBLINGUAL
Qty: 25 TABLET | Refills: 0 | Status: SHIPPED | OUTPATIENT
Start: 2018-12-27 | End: 2019-01-15 | Stop reason: SDUPTHER

## 2019-01-14 RX ORDER — METOPROLOL TARTRATE 50 MG/1
TABLET, FILM COATED ORAL
Qty: 90 TABLET | Refills: 1 | Status: SHIPPED | OUTPATIENT
Start: 2019-01-14 | End: 2019-07-14 | Stop reason: SDUPTHER

## 2019-01-14 RX ORDER — ATORVASTATIN CALCIUM 40 MG/1
TABLET, FILM COATED ORAL
Qty: 90 TABLET | Refills: 2 | Status: SHIPPED | OUTPATIENT
Start: 2019-01-14 | End: 2019-04-24 | Stop reason: DRUGHIGH

## 2019-01-15 RX ORDER — NITROGLYCERIN 0.4 MG/1
TABLET SUBLINGUAL
Qty: 25 TABLET | Refills: 2 | Status: SHIPPED | OUTPATIENT
Start: 2019-01-15

## 2019-03-13 RX ORDER — CAPTOPRIL 25 MG/1
25 TABLET ORAL 2 TIMES DAILY
Qty: 180 TABLET | Refills: 2 | Status: SHIPPED | OUTPATIENT
Start: 2019-03-13 | End: 2019-12-14 | Stop reason: SDUPTHER

## 2019-03-13 NOTE — TELEPHONE ENCOUNTER
MED REFILL REQUEST      CAPTOPRIL 25MG (1) BID    [facility]     6 MOS CHECKUP SCHEDULED FOR 04.23.19

## 2019-04-23 ENCOUNTER — OFFICE VISIT (OUTPATIENT)
Dept: FAMILY MEDICINE CLINIC | Facility: CLINIC | Age: 59
End: 2019-04-23

## 2019-04-23 VITALS
TEMPERATURE: 97.7 F | WEIGHT: 220.6 LBS | SYSTOLIC BLOOD PRESSURE: 129 MMHG | HEART RATE: 66 BPM | BODY MASS INDEX: 29.24 KG/M2 | DIASTOLIC BLOOD PRESSURE: 79 MMHG | HEIGHT: 73 IN | OXYGEN SATURATION: 97 %

## 2019-04-23 DIAGNOSIS — E78.2 MIXED HYPERLIPIDEMIA: Primary | ICD-10-CM

## 2019-04-23 PROCEDURE — 99213 OFFICE O/P EST LOW 20 MIN: CPT | Performed by: FAMILY MEDICINE

## 2019-04-23 NOTE — PATIENT INSTRUCTIONS
Mediterranean Diet  A Mediterranean diet refers to food and lifestyle choices that are based on the traditions of countries located on the [place]. This way of eating has been shown to help prevent certain conditions and improve outcomes for people who have chronic diseases, like kidney disease and heart disease.  What are tips for following this plan?  Lifestyle  · Cook and eat meals together with your family, when possible.  · Drink enough fluid to keep your urine clear or pale yellow.  · Be physically active every day. This includes:  ? Aerobic exercise like running or swimming.  ? Leisure activities like gardening, walking, or housework.  · Get 7-8 hours of sleep each night.  · If recommended by your health care provider, drink red wine in moderation. This means 1 glass a day for nonpregnant women and 2 glasses a day for men. A glass of wine equals 5 oz (150 mL).  Reading food labels  · Check the serving size of packaged foods. For foods such as rice and pasta, the serving size refers to the amount of cooked product, not dry.  · Check the total fat in packaged foods. Avoid foods that have saturated fat or trans fats.  · Check the ingredients list for added sugars, such as corn syrup.  Shopping  · At the grocery store, buy most of your food from the areas near the walls of the store. This includes:  ? Fresh fruits and vegetables (produce).  ? Grains, beans, nuts, and seeds. Some of these may be available in unpackaged forms or large amounts (in bulk).  ? Fresh seafood.  ? Poultry and eggs.  ? Low-fat dairy products.  · Buy whole ingredients instead of prepackaged foods.  · Buy fresh fruits and vegetables in-season from local farmers markets.  · Buy frozen fruits and vegetables in resealable bags.  · If you do not have access to quality fresh seafood, buy precooked frozen shrimp or canned fish, such as tuna, salmon, or sardines.  · Buy small amounts of raw or cooked vegetables, salads, or olives from the  deli or salad bar at your store.  · Stock your pantry so you always have certain foods on hand, such as olive oil, canned tuna, canned tomatoes, rice, pasta, and beans.  Cooking  · Cook foods with extra-virgin olive oil instead of using butter or other vegetable oils.  · Have meat as a side dish, and have vegetables or grains as your main dish. This means having meat in small portions or adding small amounts of meat to foods like pasta or stew.  · Use beans or vegetables instead of meat in common dishes like chili or lasagna.  · [place] with different cooking methods. Try roasting or broiling vegetables instead of steaming or sautéeing them.  · Add frozen vegetables to soups, stews, pasta, or rice.  · Add nuts or seeds for added healthy fat at each meal. You can add these to yogurt, salads, or vegetable dishes.  · Marinate fish or vegetables using olive oil, lemon juice, garlic, and fresh herbs.  Meal planning  · Plan to eat 1 vegetarian meal one day each week. Try to work up to 2 vegetarian meals, if possible.  · Eat seafood 2 or more times a week.  · Have healthy snacks readily available, such as:  ? Vegetable sticks with hummus.  ? Greek yogurt.  ? Fruit and nut trail mix.  · Eat balanced meals throughout the week. This includes:  ? Fruit: 2-3 servings a day  ? Vegetables: 4-5 servings a day  ? Low-fat dairy: 2 servings a day  ? Fish, poultry, or lean meat: 1 serving a day  ? Beans and legumes: 2 or more servings a week  ? Nuts and seeds: 1-2 servings a day  ? Whole grains: 6-8 servings a day  ? Extra-virgin olive oil: 3-4 servings a day  · Limit red meat and sweets to only a few servings a month  What are my food choices?  · Mediterranean diet  ? Recommended  ? Grains: Whole-grain pasta. Brown rice. Bulgar wheat. Polenta. Couscous. Whole-wheat bread. Oatmeal. Quinoa.  ? Vegetables: Artichokes. Beets. Broccoli. Cabbage. Carrots. Eggplant. Green beans. Chard. Kale. Spinach. Onions. Leeks. Peas. Squash.  Tomatoes. Peppers. Radishes.  ? Fruits: Apples. Apricots. Avocado. Berries. Bananas. Cherries. Dates. Figs. Grapes. Milli. Melon. Oranges. Peaches. Plums. Pomegranate.  ? Meats and other protein foods: Beans. Almonds. Sunflower seeds. Pine nuts. Peanuts. Cod. [place]. Scallops. Shrimp. Tuna. Tilapia. Clams. Oysters. Eggs.  ? Dairy: Low-fat milk. Cheese. Greek yogurt.  ? Beverages: Water. Red wine. Herbal tea.  ? Fats and oils: Extra virgin olive oil. Avocado oil. Grape seed oil.  ? Sweets and desserts: Greek yogurt with honey. Baked apples. Poached pears. Trail mix.  ? Seasoning and other foods: Basil. Cilantro. Coriander. Cumin. Mint. Parsley. Marcin. Rosemary. Tarragon. Garlic. Oregano. Thyme. Pepper. Balsalmic vinegar. Tahini. Hummus. Tomato sauce. Olives. Mushrooms.  ? Limit these  ? Grains: Prepackaged pasta or rice dishes. Prepackaged cereal with added sugar.  ? Vegetables: Deep fried potatoes (french fries).  ? Fruits: Fruit canned in syrup.  ? Meats and other protein foods: Beef. Pork. Lamb. Poultry with skin. Hot dogs. [place].  ? Dairy: Ice cream. Sour cream. Whole milk.  ? Beverages: Juice. Sugar-sweetened soft drinks. Beer. Liquor and spirits.  ? Fats and oils: Butter. Canola oil. Vegetable oil. Beef fat (tallow). Lard.  ? Sweets and desserts: Cookies. Cakes. Pies. Candy.  ? Seasoning and other foods: Mayonnaise. Premade sauces and marinades.  ? The items listed may not be a complete list. Talk with your dietitian about what dietary choices are right for you.  Summary  · The Mediterranean diet includes both food and lifestyle choices.  · Eat a variety of fresh fruits and vegetables, beans, nuts, seeds, and whole grains.  · Limit the amount of red meat and sweets that you eat.  · Talk with your health care provider about whether it is safe for you to drink red wine in moderation. This means 1 glass a day for nonpregnant women and 2 glasses a day for men. A glass of wine equals 5 oz (150 mL).  This information  is not intended to replace advice given to you by your health care provider. Make sure you discuss any questions you have with your health care provider.  Document Released: 08/10/2017 Document Revised: 09/12/2017 Document Reviewed: 08/10/2017  [employer] Interactive Patient Education © 2019 [employer]

## 2019-04-23 NOTE — PROGRESS NOTES
Subjective   Gregorio Callahan is a 59 y.o. male.     59-year-old with history of ischemic heart disease hypertension hyperlipidemia      Hyperlipidemia   This is a chronic problem. The current episode started more than 1 year ago. The problem is controlled. There are no known factors aggravating his hyperlipidemia. Pertinent negatives include no chest pain, myalgias or shortness of breath. Current antihyperlipidemic treatment includes diet change and statins. The current treatment provides moderate improvement of lipids. Compliance problems include adherence to diet and adherence to exercise.  Risk factors for coronary artery disease include dyslipidemia, male sex, hypertension and a sedentary lifestyle.       The following portions of the patient's history were reviewed and updated as appropriate: allergies, current medications, past family history, past medical history, past social history, past surgical history and problem list.    Review of Systems   Respiratory: Negative for shortness of breath.    Cardiovascular: Negative for chest pain and leg swelling.   Musculoskeletal: Negative for myalgias.       Objective   Physical Exam   Constitutional: He is oriented to person, place, and time.   Overweight   Cardiovascular: Normal rate and regular rhythm.   Pulmonary/Chest: Effort normal and breath sounds normal.   Musculoskeletal: He exhibits no edema.   Neurological: He is alert and oriented to person, place, and time.   Psychiatric: He has a normal mood and affect. His behavior is normal. Judgment and thought content normal.   Nursing note and vitals reviewed.      Assessment/Plan   Patient Active Problem List   Diagnosis   • Hyperlipidemia, mild   • Benign essential hypertension   • CAD in native artery   • H/O acute myocardial infarction   • History of PTCA     Gregorio was seen today for follow-up.    Diagnoses and all orders for this visit:    Mixed hyperlipidemia  -     Comprehensive Metabolic Panel  -     Lipid  Panel       Return if symptoms worsen or fail to improve, for Annual physical.    Plan above-Mediterranean diet stressed activity

## 2019-04-24 DIAGNOSIS — E78.00 PURE HYPERCHOLESTEROLEMIA: Primary | ICD-10-CM

## 2019-04-24 LAB
ALBUMIN SERPL-MCNC: 5 G/DL (ref 3.5–5.2)
ALBUMIN/GLOB SERPL: 2.3 G/DL
ALP SERPL-CCNC: 86 U/L (ref 39–117)
ALT SERPL-CCNC: 29 U/L (ref 1–41)
AST SERPL-CCNC: 34 U/L (ref 1–40)
BILIRUB SERPL-MCNC: 0.6 MG/DL (ref 0.2–1.2)
BUN SERPL-MCNC: 17 MG/DL (ref 6–20)
BUN/CREAT SERPL: 17.5 (ref 7–25)
CALCIUM SERPL-MCNC: 9.3 MG/DL (ref 8.6–10.5)
CHLORIDE SERPL-SCNC: 99 MMOL/L (ref 98–107)
CHOLEST SERPL-MCNC: 142 MG/DL (ref 0–200)
CO2 SERPL-SCNC: 24.8 MMOL/L (ref 22–29)
CREAT SERPL-MCNC: 0.97 MG/DL (ref 0.76–1.27)
GLOBULIN SER CALC-MCNC: 2.2 GM/DL
GLUCOSE SERPL-MCNC: 93 MG/DL (ref 65–99)
HDLC SERPL-MCNC: 35 MG/DL (ref 40–60)
LDLC SERPL CALC-MCNC: 82 MG/DL (ref 0–100)
POTASSIUM SERPL-SCNC: 4.5 MMOL/L (ref 3.5–5.2)
PROT SERPL-MCNC: 7.2 G/DL (ref 6–8.5)
SODIUM SERPL-SCNC: 140 MMOL/L (ref 136–145)
TRIGL SERPL-MCNC: 124 MG/DL (ref 0–150)
VLDLC SERPL CALC-MCNC: 24.8 MG/DL (ref 5–40)

## 2019-04-24 RX ORDER — ATORVASTATIN CALCIUM 80 MG/1
80 TABLET, FILM COATED ORAL DAILY
Qty: 30 TABLET | Refills: 11 | Status: SHIPPED | OUTPATIENT
Start: 2019-04-24 | End: 2020-05-11

## 2019-04-29 RX ORDER — CITALOPRAM 20 MG/1
TABLET ORAL
Qty: 90 TABLET | Refills: 1 | Status: SHIPPED | OUTPATIENT
Start: 2019-04-29 | End: 2019-07-12 | Stop reason: DRUGHIGH

## 2019-05-17 RX ORDER — TRAZODONE HYDROCHLORIDE 50 MG/1
TABLET ORAL
Qty: 180 TABLET | Refills: 1 | Status: SHIPPED | OUTPATIENT
Start: 2019-05-17 | End: 2020-05-13

## 2019-06-24 ENCOUNTER — TELEPHONE (OUTPATIENT)
Dept: FAMILY MEDICINE CLINIC | Facility: CLINIC | Age: 59
End: 2019-06-24

## 2019-06-24 NOTE — TELEPHONE ENCOUNTER
Patient called and stated that he is going to have to have a stronger dosage amount on his anti depressant medication or be changed to something stronger for a while.  Please advise?

## 2019-07-12 RX ORDER — CITALOPRAM 40 MG/1
40 TABLET ORAL DAILY
Qty: 90 TABLET | Refills: 1 | Status: SHIPPED | OUTPATIENT
Start: 2019-07-12 | End: 2020-01-03

## 2019-07-15 RX ORDER — METOPROLOL TARTRATE 50 MG/1
TABLET, FILM COATED ORAL
Qty: 90 TABLET | Refills: 0 | Status: SHIPPED | OUTPATIENT
Start: 2019-07-15 | End: 2020-01-07

## 2019-07-22 ENCOUNTER — OFFICE VISIT (OUTPATIENT)
Dept: FAMILY MEDICINE CLINIC | Facility: CLINIC | Age: 59
End: 2019-07-22

## 2019-07-22 ENCOUNTER — NURSE TRIAGE (OUTPATIENT)
Dept: CALL CENTER | Facility: HOSPITAL | Age: 59
End: 2019-07-22

## 2019-07-22 VITALS
SYSTOLIC BLOOD PRESSURE: 117 MMHG | DIASTOLIC BLOOD PRESSURE: 76 MMHG | BODY MASS INDEX: 28.52 KG/M2 | WEIGHT: 215.2 LBS | HEIGHT: 73 IN | OXYGEN SATURATION: 98 % | TEMPERATURE: 98.3 F | HEART RATE: 64 BPM

## 2019-07-22 DIAGNOSIS — R52 PAIN: Primary | ICD-10-CM

## 2019-07-22 PROCEDURE — 99213 OFFICE O/P EST LOW 20 MIN: CPT | Performed by: FAMILY MEDICINE

## 2019-07-22 NOTE — PROGRESS NOTES
Subjective   Gregorio Callahan is a 59 y.o. male.     59-year-old with acute shingles-occipital- left      Pain   This is a new problem. The current episode started in the past 7 days. The problem occurs daily. The problem has been waxing and waning. Associated symptoms include fatigue and a rash. Nothing aggravates the symptoms. Treatments tried: Valtrex.       The following portions of the patient's history were reviewed and updated as appropriate: allergies, current medications, past family history, past medical history, past social history, past surgical history and problem list.    Review of Systems   Constitutional: Positive for fatigue.   Skin: Positive for rash.        Left occipital area   Psychiatric/Behavioral:        Mood much improved on 40 mg of Celexa       Objective   Physical Exam   Constitutional: He is oriented to person, place, and time.   Overweight   Cardiovascular: Normal rate and regular rhythm.   Pulmonary/Chest: Effort normal and breath sounds normal.   Musculoskeletal: He exhibits no edema.   Neurological: He is alert and oriented to person, place, and time.   Skin: Rash noted.   Rash left occipital area consistent with shingles-continue medicine   Psychiatric: He has a normal mood and affect. His behavior is normal. Judgment and thought content normal.   Nursing note and vitals reviewed.      Assessment/Plan   Gregorio was seen today for depression and herpes zoster.    Diagnoses and all orders for this visit:    Pain    Plan-above-continue Celexa at 40 mg daily

## 2019-07-22 NOTE — TELEPHONE ENCOUNTER
"Calling for an appt.    Reason for Disposition  • Requesting regular office appointment    Additional Information  • Negative: [1] Caller is not with the adult (patient) AND [2] reporting urgent symptoms  • Negative: Lab result questions  • Negative: Medication questions  • Negative: Caller cannot be reached by phone  • Negative: Caller has already spoken to PCP or another triager  • Negative: RN needs further essential information from caller in order to complete triage  • Negative: [1] Caller requesting NON-URGENT health information AND [2] PCP's office is the best resource    Answer Assessment - Initial Assessment Questions  1. REASON FOR CALL or QUESTION: \"What is your reason for calling today?\" or \"How can I best help you?\" or \"What question do you have that I can help answer?\"      I was calling the office.    Protocols used: INFORMATION ONLY CALL-ADULT-      "

## 2019-11-12 ENCOUNTER — OFFICE VISIT (OUTPATIENT)
Dept: FAMILY MEDICINE CLINIC | Facility: CLINIC | Age: 59
End: 2019-11-12

## 2019-11-12 VITALS
DIASTOLIC BLOOD PRESSURE: 75 MMHG | TEMPERATURE: 97.5 F | SYSTOLIC BLOOD PRESSURE: 109 MMHG | HEIGHT: 73 IN | BODY MASS INDEX: 29.24 KG/M2 | WEIGHT: 220.6 LBS | OXYGEN SATURATION: 97 % | HEART RATE: 70 BPM

## 2019-11-12 DIAGNOSIS — Z23 NEED FOR SHINGLES VACCINE: Primary | ICD-10-CM

## 2019-11-12 DIAGNOSIS — Z23 NEED FOR IMMUNIZATION AGAINST INFLUENZA: ICD-10-CM

## 2019-11-12 DIAGNOSIS — Z00.00 ROUTINE GENERAL MEDICAL EXAMINATION AT A HEALTH CARE FACILITY: ICD-10-CM

## 2019-11-12 DIAGNOSIS — G47.33 OSA (OBSTRUCTIVE SLEEP APNEA): ICD-10-CM

## 2019-11-12 DIAGNOSIS — I10 HYPERTENSION, UNSPECIFIED TYPE: ICD-10-CM

## 2019-11-12 LAB
BILIRUB BLD-MCNC: NEGATIVE MG/DL
CLARITY, POC: CLEAR
COLOR UR: YELLOW
GLUCOSE UR STRIP-MCNC: NEGATIVE MG/DL
KETONES UR QL: ABNORMAL
LEUKOCYTE EST, POC: NEGATIVE
NITRITE UR-MCNC: NEGATIVE MG/ML
PH UR: 5.5 [PH] (ref 5–8)
PROT UR STRIP-MCNC: NEGATIVE MG/DL
RBC # UR STRIP: NEGATIVE /UL
SP GR UR: 1.02 (ref 1–1.03)
UROBILINOGEN UR QL: NORMAL

## 2019-11-12 PROCEDURE — 81003 URINALYSIS AUTO W/O SCOPE: CPT | Performed by: FAMILY MEDICINE

## 2019-11-12 PROCEDURE — 90674 CCIIV4 VAC NO PRSV 0.5 ML IM: CPT | Performed by: FAMILY MEDICINE

## 2019-11-12 PROCEDURE — 90471 IMMUNIZATION ADMIN: CPT | Performed by: FAMILY MEDICINE

## 2019-11-12 PROCEDURE — 99396 PREV VISIT EST AGE 40-64: CPT | Performed by: FAMILY MEDICINE

## 2019-11-12 RX ORDER — ZOSTER VACCINE RECOMBINANT, ADJUVANTED 50 MCG/0.5
0.5 KIT INTRAMUSCULAR ONCE
Qty: 1 EACH | Refills: 1 | Status: SHIPPED | OUTPATIENT
Start: 2019-11-12 | End: 2019-11-12

## 2019-11-12 NOTE — PROGRESS NOTES
Subjective   Gregorio Callahan is a 59 y.o. male.     59-year-old male with known ischemic heart disease hypertension hyperlipidemia      Hyperlipidemia   This is a chronic problem. The current episode started more than 1 year ago. The problem is controlled. Recent lipid tests were reviewed and are normal. There are no known factors aggravating his hyperlipidemia. Pertinent negatives include no chest pain. Current antihyperlipidemic treatment includes diet change and statins. The current treatment provides moderate improvement of lipids. Compliance problems include adherence to exercise and adherence to diet.  Risk factors for coronary artery disease include dyslipidemia, family history, male sex, hypertension and a sedentary lifestyle.        The following portions of the patient's history were reviewed and updated as appropriate: allergies, current medications, past family history, past medical history, past social history, past surgical history and problem list.    Review of Systems   Respiratory: Positive for apnea.         Snoring gasping high risk for sleep apnea-history of ischemic heart disease will do home study to rule out underlying sleep apnea--low-dose CT of chest last year okay except for emphysema-been off cigarettes 12 years-wants to wait till next year to do another low-dose CT   Cardiovascular: Negative for chest pain and leg swelling.   Gastrointestinal:        Cologuard 2018-   Genitourinary: Negative for difficulty urinating.   All other systems reviewed and are negative.      Objective   Physical Exam   Constitutional: He is oriented to person, place, and time. He appears well-developed and well-nourished.   Overweight   HENT:   Right Ear: External ear normal.   Left Ear: External ear normal.   Mouth/Throat: Oropharynx is clear and moist.   Eyes: EOM are normal. Pupils are equal, round, and reactive to light.   Neck: No thyromegaly present.   Good carotid pulses no bruits   Cardiovascular: Normal  rate and regular rhythm.   Pulmonary/Chest: Effort normal and breath sounds normal.   Abdominal: Soft. Bowel sounds are normal.   Genitourinary: Rectum normal, prostate normal and penis normal.   Genitourinary Comments: No testicular masses inguinal hernias or adenopathy prostate 2+ no nodules   Musculoskeletal: He exhibits no edema.   Lymphadenopathy:     He has no cervical adenopathy.   Neurological: He is alert and oriented to person, place, and time.   Skin: Skin is warm and dry. Capillary refill takes less than 2 seconds.   Psychiatric: He has a normal mood and affect. His behavior is normal. Judgment and thought content normal.   Nursing note and vitals reviewed.        Assessment/Plan   Problems Addressed this Visit     None      Visit Diagnoses     Need for shingles vaccine    -  Primary    Relevant Medications    SHINGRIX 50 MCG/0.5ML reconstituted suspension    Routine general medical examination at a health care facility        Relevant Orders    TSH    T4, free    CBC & Differential    Comprehensive Metabolic Panel    Lipid Panel    PSA Screen    Need for immunization against influenza        Relevant Orders    Flucelvax Quad=>4Years (PFS)    Hypertension, unspecified type        Relevant Orders    POC Urinalysis Dipstick, Multipro            Plan above plus sleep study plus counseling on exercise dietary management weight management-advised shingles shot and sleep study

## 2019-11-13 LAB
ALBUMIN SERPL-MCNC: 5.2 G/DL (ref 3.5–5.2)
ALBUMIN/GLOB SERPL: 2.2 G/DL
ALP SERPL-CCNC: 91 U/L (ref 39–117)
ALT SERPL-CCNC: 28 U/L (ref 1–41)
AST SERPL-CCNC: 33 U/L (ref 1–40)
BASOPHILS # BLD AUTO: 0.03 10*3/MM3 (ref 0–0.2)
BASOPHILS NFR BLD AUTO: 0.5 % (ref 0–1.5)
BILIRUB SERPL-MCNC: 0.7 MG/DL (ref 0.2–1.2)
BUN SERPL-MCNC: 16 MG/DL (ref 6–20)
BUN/CREAT SERPL: 16.2 (ref 7–25)
CALCIUM SERPL-MCNC: 9.7 MG/DL (ref 8.6–10.5)
CHLORIDE SERPL-SCNC: 102 MMOL/L (ref 98–107)
CHOLEST SERPL-MCNC: 162 MG/DL (ref 0–200)
CO2 SERPL-SCNC: 29 MMOL/L (ref 22–29)
CREAT SERPL-MCNC: 0.99 MG/DL (ref 0.76–1.27)
EOSINOPHIL # BLD AUTO: 0.05 10*3/MM3 (ref 0–0.4)
EOSINOPHIL NFR BLD AUTO: 0.8 % (ref 0.3–6.2)
ERYTHROCYTE [DISTWIDTH] IN BLOOD BY AUTOMATED COUNT: 12.7 % (ref 12.3–15.4)
GLOBULIN SER CALC-MCNC: 2.4 GM/DL
GLUCOSE SERPL-MCNC: 91 MG/DL (ref 65–99)
HCT VFR BLD AUTO: 47.1 % (ref 37.5–51)
HDLC SERPL-MCNC: 37 MG/DL (ref 40–60)
HGB BLD-MCNC: 15.6 G/DL (ref 13–17.7)
IMM GRANULOCYTES # BLD AUTO: 0.02 10*3/MM3 (ref 0–0.05)
IMM GRANULOCYTES NFR BLD AUTO: 0.3 % (ref 0–0.5)
LDLC SERPL CALC-MCNC: 86 MG/DL (ref 0–100)
LYMPHOCYTES # BLD AUTO: 1.57 10*3/MM3 (ref 0.7–3.1)
LYMPHOCYTES NFR BLD AUTO: 25.6 % (ref 19.6–45.3)
MCH RBC QN AUTO: 30.7 PG (ref 26.6–33)
MCHC RBC AUTO-ENTMCNC: 33.1 G/DL (ref 31.5–35.7)
MCV RBC AUTO: 92.7 FL (ref 79–97)
MONOCYTES # BLD AUTO: 0.72 10*3/MM3 (ref 0.1–0.9)
MONOCYTES NFR BLD AUTO: 11.7 % (ref 5–12)
NEUTROPHILS # BLD AUTO: 3.74 10*3/MM3 (ref 1.7–7)
NEUTROPHILS NFR BLD AUTO: 61.1 % (ref 42.7–76)
NRBC BLD AUTO-RTO: 0 /100 WBC (ref 0–0.2)
PLATELET # BLD AUTO: 161 10*3/MM3 (ref 140–450)
POTASSIUM SERPL-SCNC: 5.2 MMOL/L (ref 3.5–5.2)
PROT SERPL-MCNC: 7.6 G/DL (ref 6–8.5)
PSA SERPL-MCNC: 0.48 NG/ML (ref 0–4)
RBC # BLD AUTO: 5.08 10*6/MM3 (ref 4.14–5.8)
SODIUM SERPL-SCNC: 143 MMOL/L (ref 136–145)
T4 FREE SERPL-MCNC: 0.89 NG/DL (ref 0.93–1.7)
TRIGL SERPL-MCNC: 195 MG/DL (ref 0–150)
TSH SERPL DL<=0.005 MIU/L-ACNC: 2.02 UIU/ML (ref 0.27–4.2)
VLDLC SERPL CALC-MCNC: 39 MG/DL
WBC # BLD AUTO: 6.13 10*3/MM3 (ref 3.4–10.8)

## 2019-11-14 LAB
Lab: NORMAL
Lab: NORMAL

## 2019-11-15 LAB
T3FREE SERPL-MCNC: 3.5 PG/ML (ref 2–4.4)
WRITTEN AUTHORIZATION: NORMAL

## 2019-12-16 RX ORDER — CAPTOPRIL 25 MG/1
TABLET ORAL
Qty: 180 TABLET | Refills: 3 | Status: SHIPPED | OUTPATIENT
Start: 2019-12-16 | End: 2020-12-21 | Stop reason: SDUPTHER

## 2020-01-03 RX ORDER — CITALOPRAM 40 MG/1
TABLET ORAL
Qty: 90 TABLET | Refills: 1 | Status: SHIPPED | OUTPATIENT
Start: 2020-01-03 | End: 2020-06-24

## 2020-01-07 ENCOUNTER — TELEPHONE (OUTPATIENT)
Dept: CARDIOLOGY | Facility: CLINIC | Age: 60
End: 2020-01-07

## 2020-01-07 ENCOUNTER — OFFICE VISIT (OUTPATIENT)
Dept: CARDIOLOGY | Facility: CLINIC | Age: 60
End: 2020-01-07

## 2020-01-07 VITALS
HEART RATE: 61 BPM | SYSTOLIC BLOOD PRESSURE: 99 MMHG | DIASTOLIC BLOOD PRESSURE: 68 MMHG | BODY MASS INDEX: 29.95 KG/M2 | WEIGHT: 226 LBS | HEIGHT: 73 IN

## 2020-01-07 DIAGNOSIS — I25.10 CAD IN NATIVE ARTERY: Primary | ICD-10-CM

## 2020-01-07 DIAGNOSIS — I10 BENIGN ESSENTIAL HYPERTENSION: ICD-10-CM

## 2020-01-07 DIAGNOSIS — E78.2 MIXED HYPERLIPIDEMIA: ICD-10-CM

## 2020-01-07 PROCEDURE — 93000 ELECTROCARDIOGRAM COMPLETE: CPT | Performed by: INTERNAL MEDICINE

## 2020-01-07 PROCEDURE — 99213 OFFICE O/P EST LOW 20 MIN: CPT | Performed by: INTERNAL MEDICINE

## 2020-01-07 RX ORDER — EZETIMIBE 10 MG/1
10 TABLET ORAL DAILY
Qty: 90 TABLET | Refills: 3 | Status: SHIPPED | OUTPATIENT
Start: 2020-01-07 | End: 2020-12-23

## 2020-01-07 RX ORDER — ICOSAPENT ETHYL 1000 MG/1
2 CAPSULE ORAL 2 TIMES DAILY WITH MEALS
Qty: 360 CAPSULE | Refills: 3 | Status: ON HOLD | OUTPATIENT
Start: 2020-01-07 | End: 2022-03-21

## 2020-01-07 NOTE — TELEPHONE ENCOUNTER
The Reduce-IT trial got FDA approval for this type of fish-oil only - any other formulation may or may not be helpful and therefore I cannot recommend the change the pharmacy is requesting

## 2020-01-07 NOTE — TELEPHONE ENCOUNTER
Received a fax today from [facility] pharmacy.  The Vascepa prescribed to patient today is not covered.  An alternative is being requested.  Pharmacy is suggesting Omega-3 Ethyl Esters 1gm capsule.  Please advise.

## 2020-01-07 NOTE — PROGRESS NOTES
Subjective    Gregorio Callahan is a 59 y.o. male. Fu of ihd and risks    History of Present Illness     IHD:  Is active with good stamina. 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 He never has cp and can do strenuous exertion when he needs to. He is compliant with meds but not with diet. EKG is nml and nsc today.    HTN:  All clinic checks are low in the target range. He does not have light-headedness    HLD:  Is not to goal with TGL's or LDL per most recent check  - he is willing to add meds to achieve targets. Has no myalgias.        The following portions of the patient's history were reviewed and updated as appropriate: allergies, current medications, past family history, past medical history, past social history, past surgical history and problem list.    Patient Active Problem List   Diagnosis   • Hyperlipidemia, mild   • Benign essential hypertension   • CAD in native artery   • H/O acute myocardial infarction   • History of PTCA       No Known Allergies    Family History   Problem Relation Age of Onset   • Heart disease Mother    • Heart attack Sister    • Alzheimer's disease Father    • No Known Problems Maternal Grandmother    • No Known Problems Maternal Grandfather    • No Known Problems Paternal Grandmother    • No Known Problems Paternal Grandfather        Social History     Socioeconomic History   • Marital status:      Spouse name: Not on file   • Number of children: Not on file   • Years of education: Not on file   • Highest education level: Not on file   Tobacco Use   • Smoking status: Former Smoker     Packs/day: 3.00     Years: 30.00     Pack years: 90.00     Types: Cigarettes     Last attempt to quit: 2007     Years since quittin.0   • Smokeless tobacco: Never Used   • Tobacco comment: age quit smokin   Substance and Sexual Activity   • Alcohol use: Yes     Comment: Occasional alcohol use. Drinks beer.   • Drug use: No   • Sexual  activity: Defer         Current Outpatient Medications:   •  aspirin 81 MG EC tablet, Take 81 mg by mouth Daily., Disp: , Rfl:   •  atorvastatin (LIPITOR) 80 MG tablet, Take 1 tablet by mouth Daily., Disp: 30 tablet, Rfl: 11  •  captopril (CAPOTEN) 25 MG tablet, TAKE 1 TABLET BY MOUTH TWICE A DAY, Disp: 180 tablet, Rfl: 3  •  citalopram (CeleXA) 40 MG tablet, TAKE 1 TABLET BY MOUTH EVERY DAY, Disp: 90 tablet, Rfl: 1  •  Ginkgo Biloba (GINKOBA) 40 MG tablet, Take 40 mg by mouth Daily., Disp: , Rfl:   •  ibuprofen (ADVIL) 200 MG tablet, Take 200 mg by mouth Every 6 (Six) Hours As Needed for Mild Pain ., Disp: , Rfl:   •  nitroglycerin (NITROSTAT) 0.4 MG SL tablet, TAKE 1 TABLET BY MOUTH EVERY 5 MINUTES UP TO 3 TIMES AS NEEDED FOR CHEST PAIN., Disp: 25 tablet, Rfl: 2  •  Nutritional Supplements (OSTEO ADVANCE) tablet, Take  by mouth 2 (Two) Times a Day., Disp: , Rfl:   •  Potassium 99 MG tablet, Take 99 mg by mouth Daily., Disp: , Rfl:   •  traZODone (DESYREL) 50 MG tablet, TAKE 1 OR 2 TABLETS BY MOUTH AT BEDTIME AS NEEDED, Disp: 180 tablet, Rfl: 1  •  VIAGRA 100 MG tablet, TAKE AS DIRECTED, Disp: 6 tablet, Rfl: 3  •  ezetimibe (ZETIA) 10 MG tablet, Take 1 tablet by mouth Daily., Disp: 90 tablet, Rfl: 3  •  icosapent ethyl (VASCEPA) 1 g capsule capsule, Take 2 g by mouth 2 (Two) Times a Day With Meals., Disp: 360 capsule, Rfl: 3    Past Surgical History:   Procedure Laterality Date   • CARDIAC CATHETERIZATION     • CORONARY STENT PLACEMENT      x1       Review of Systems   Constitutional: Negative for fatigue, fever and unexpected weight change.   Respiratory: Negative for apnea, chest tightness and shortness of breath.    Cardiovascular: Negative for chest pain, palpitations and leg swelling.   Gastrointestinal: Negative for abdominal pain.   Genitourinary: Positive for urgency. Negative for dysuria.   Musculoskeletal: Negative for myalgias.   Neurological: Negative for weakness and light-headedness.  "  Psychiatric/Behavioral: Negative for sleep disturbance.       BP 99/68   Pulse 61   Ht 185.4 cm (73\")   Wt 103 kg (226 lb)   BMI 29.82 kg/m²   Procedures    Objective   Physical Exam   Constitutional: He is oriented to person, place, and time. He appears well-developed and well-nourished. No distress.   HENT:   Head: Normocephalic.   Eyes: Pupils are equal, round, and reactive to light.   Neck: No thyromegaly present.   Cardiovascular: Normal rate, regular rhythm, normal heart sounds and intact distal pulses. Exam reveals no gallop and no friction rub.   No murmur heard.  Pulmonary/Chest: Effort normal and breath sounds normal. No stridor. No respiratory distress. He has no wheezes. He has no rales. He exhibits no tenderness.   Abdominal: Soft. Bowel sounds are normal. He exhibits no distension and no mass. There is no tenderness. There is no rebound and no guarding.   Musculoskeletal: He exhibits no edema, tenderness or deformity.   Neurological: He is alert and oriented to person, place, and time.   Skin: Skin is warm and dry. He is not diaphoretic.   Psychiatric: He has a normal mood and affect.       Assessment/Plan   Gregorio was seen today for coronary artery disease, hypertension and hyperlipidemia.    Diagnoses and all orders for this visit:    CAD in native artery  Comments:  no angina  Orders:  -     ECG 12 Lead    Mixed hyperlipidemia  Comments:  TGL and LDL not to goal - add Zetia and Vascepa  Orders:  -     ezetimibe (ZETIA) 10 MG tablet; Take 1 tablet by mouth Daily.  -     icosapent ethyl (VASCEPA) 1 g capsule capsule; Take 2 g by mouth 2 (Two) Times a Day With Meals.    Benign essential hypertension  Comments:  tight control - ok to stop Lopressor                 Return in about 1 year (around 1/7/2021) for Next scheduled follow up.  Orders Placed This Encounter   Procedures   • ECG 12 Lead     Order Specific Question:   Reason for Exam:     Answer:   [email or web address]     "

## 2020-04-24 ENCOUNTER — OFFICE VISIT (OUTPATIENT)
Dept: FAMILY MEDICINE CLINIC | Facility: CLINIC | Age: 60
End: 2020-04-24

## 2020-04-24 VITALS
HEART RATE: 64 BPM | WEIGHT: 228 LBS | BODY MASS INDEX: 30.22 KG/M2 | HEIGHT: 73 IN | DIASTOLIC BLOOD PRESSURE: 82 MMHG | TEMPERATURE: 98.2 F | SYSTOLIC BLOOD PRESSURE: 126 MMHG | OXYGEN SATURATION: 98 %

## 2020-04-24 DIAGNOSIS — R52 PAIN: Primary | ICD-10-CM

## 2020-04-24 PROCEDURE — 99214 OFFICE O/P EST MOD 30 MIN: CPT | Performed by: FAMILY MEDICINE

## 2020-04-24 PROCEDURE — 96372 THER/PROPH/DIAG INJ SC/IM: CPT | Performed by: FAMILY MEDICINE

## 2020-04-24 RX ORDER — METHYLPREDNISOLONE ACETATE 40 MG/ML
40 INJECTION, SUSPENSION INTRA-ARTICULAR; INTRALESIONAL; INTRAMUSCULAR; SOFT TISSUE ONCE
Status: COMPLETED | OUTPATIENT
Start: 2020-04-24 | End: 2020-04-24

## 2020-04-24 RX ORDER — HYDROCODONE BITARTRATE AND ACETAMINOPHEN 7.5; 325 MG/1; MG/1
1 TABLET ORAL EVERY 6 HOURS PRN
Qty: 60 TABLET | Refills: 0 | Status: SHIPPED | OUTPATIENT
Start: 2020-04-24 | End: 2020-05-27

## 2020-04-24 RX ORDER — PREDNISONE 10 MG/1
TABLET ORAL
Qty: 21 TABLET | Refills: 0 | Status: SHIPPED | OUTPATIENT
Start: 2020-04-24 | End: 2020-05-27

## 2020-04-24 RX ADMIN — METHYLPREDNISOLONE ACETATE 40 MG: 40 INJECTION, SUSPENSION INTRA-ARTICULAR; INTRALESIONAL; INTRAMUSCULAR; SOFT TISSUE at 09:46

## 2020-04-24 NOTE — PROGRESS NOTES
PPE Utilized  Patient was masked upon entering facility.  I wore N95 mask, with face shield, gown, and gloves.

## 2020-04-24 NOTE — PROGRESS NOTES
Subjective   Gregorio Callahan is a 60 y.o. male.     60-year-old male with increasing back pain loss of function of right leg complains of pain    Pain   This is a new problem. The current episode started in the past 7 days. The problem occurs constantly. The problem has been waxing and waning. Associated symptoms include arthralgias. Pertinent negatives include no chest pain. Associated symptoms comments: Radiculopathy-right leg L4-5 with occasional collapse of knee. Nothing aggravates the symptoms. He has tried NSAIDs for the symptoms. The treatment provided no relief.     Vitals:    04/24/20 0919   BP: 126/82   Pulse: 64   Temp: 98.2 °F (36.8 °C)   SpO2: 98%       The following portions of the patient's history were reviewed and updated as appropriate: allergies, current medications, past family history, past medical history, past social history, past surgical history and problem list.    Review of Systems   Cardiovascular: Negative for chest pain and leg swelling.   Musculoskeletal: Positive for arthralgias and back pain.   Neurological:        Lumbar radiculopathy L4-5 on right       Objective   Physical Exam   Constitutional: He is oriented to person, place, and time.   Overweight   Neurological: He is alert and oriented to person, place, and time. He displays abnormal reflex.   L4-5 or patella reflex markedly decreased with weakness of quadriceps   Psychiatric: He has a normal mood and affect. His behavior is normal. Judgment and thought content normal.   Nursing note and vitals reviewed.      Patient's Body mass index is 30.08 kg/m². BMI is above normal parameters. Recommendations include: exercise counseling.      Assessment/Plan   Patient Active Problem List   Diagnosis   • Hyperlipidemia, mild   • Benign essential hypertension   • CAD in native artery   • H/O acute myocardial infarction   • History of PTCA     Gregorio was seen today for leg pain and back pain.    Diagnoses and all orders for this  visit:    Pain  -     methylPREDNISolone acetate (DEPO-medrol) injection 40 mg  -     predniSONE (DELTASONE) 10 MG (21) tablet pack; Use as directed on package--start Saturday morning  -     HYDROcodone-acetaminophen (NORCO) 7.5-325 MG per tablet; Take 1 tablet by mouth Every 6 (Six) Hours As Needed for Moderate Pain .  -     MRI Lumbar Spine Without Contrast; Future       Return if symptoms worsen or fail to improve.       Plan-urgent lumbar MRI for L4-5 probable disc with loss of function      Electronically signed by Ajith Barrientos MD 04/24/2020

## 2020-04-27 ENCOUNTER — HOSPITAL ENCOUNTER (OUTPATIENT)
Dept: MRI IMAGING | Facility: HOSPITAL | Age: 60
Discharge: HOME | End: 2020-04-27
Admitting: FAMILY MEDICINE

## 2020-04-27 DIAGNOSIS — R52 PAIN: ICD-10-CM

## 2020-04-27 PROCEDURE — 72148 MRI LUMBAR SPINE W/O DYE: CPT

## 2020-04-27 RX ORDER — PREDNISONE 20 MG/1
TABLET ORAL
Qty: 25 TABLET | Refills: 0 | Status: SHIPPED | OUTPATIENT
Start: 2020-04-27 | End: 2020-05-13

## 2020-05-10 DIAGNOSIS — E78.00 PURE HYPERCHOLESTEROLEMIA: ICD-10-CM

## 2020-05-11 RX ORDER — ATORVASTATIN CALCIUM 80 MG/1
TABLET, FILM COATED ORAL
Qty: 90 TABLET | Refills: 3 | Status: SHIPPED | OUTPATIENT
Start: 2020-05-11 | End: 2021-04-29

## 2020-05-13 RX ORDER — TRAZODONE HYDROCHLORIDE 50 MG/1
TABLET ORAL
Qty: 180 TABLET | Refills: 1 | Status: SHIPPED | OUTPATIENT
Start: 2020-05-13 | End: 2020-11-03

## 2020-05-13 RX ORDER — PREDNISONE 20 MG/1
TABLET ORAL
Qty: 25 TABLET | Refills: 0 | Status: SHIPPED | OUTPATIENT
Start: 2020-05-13 | End: 2020-05-27

## 2020-05-27 ENCOUNTER — OFFICE VISIT (OUTPATIENT)
Dept: FAMILY MEDICINE CLINIC | Facility: CLINIC | Age: 60
End: 2020-05-27

## 2020-05-27 VITALS
BODY MASS INDEX: 29.69 KG/M2 | DIASTOLIC BLOOD PRESSURE: 78 MMHG | HEIGHT: 73 IN | WEIGHT: 224 LBS | OXYGEN SATURATION: 94 % | SYSTOLIC BLOOD PRESSURE: 140 MMHG | HEART RATE: 82 BPM | TEMPERATURE: 97.6 F

## 2020-05-27 DIAGNOSIS — R52 PAIN: Primary | ICD-10-CM

## 2020-05-27 PROCEDURE — 99213 OFFICE O/P EST LOW 20 MIN: CPT | Performed by: FAMILY MEDICINE

## 2020-05-27 RX ORDER — PREDNISONE 20 MG/1
TABLET ORAL
Qty: 25 TABLET | Refills: 0 | OUTPATIENT
Start: 2020-05-27

## 2020-05-27 NOTE — PROGRESS NOTES
Subjective   Gregorio Callahan is a 60 y.o. male.     60-year-old male with history of recent lumbar radiculopathy on the right affecting his patellar reflex.    Pain   This is a new problem. The current episode started more than 1 month ago. The problem occurs daily. The problem has been gradually improving. Associated symptoms include arthralgias. Pertinent negatives include no chest pain. Associated symptoms comments: Leg has given  out twice. Nothing aggravates the symptoms. He has tried NSAIDs for the symptoms. The treatment provided mild relief.       The following portions of the patient's history were reviewed and updated as appropriate: allergies, current medications, past family history, past medical history, past social history, past surgical history and problem list.    Review of Systems   Cardiovascular: Negative for chest pain.   Musculoskeletal: Positive for arthralgias and back pain.   Neurological:        Lumbar radiculopathy on right L4-5       Objective   Physical Exam   Constitutional: He is oriented to person, place, and time.   Musculoskeletal: He exhibits no edema or tenderness.   L4-5 reflex on right decreased with slight decrease weakness   Neurological: He is alert and oriented to person, place, and time.   Psychiatric: He has a normal mood and affect. His behavior is normal. Thought content normal.   Nursing note and vitals reviewed.      Assessment/Plan   Gregorio was seen today for back pain.    Diagnoses and all orders for this visit:    Pain       Plan patient says he is improving-advised if his knee starts giving way we should have neurosurgical intervention patient wants to keep conservative care going

## 2020-06-24 RX ORDER — CITALOPRAM 40 MG/1
TABLET ORAL
Qty: 90 TABLET | Refills: 1 | Status: SHIPPED | OUTPATIENT
Start: 2020-06-24 | End: 2020-12-15

## 2020-07-07 ENCOUNTER — OFFICE VISIT (OUTPATIENT)
Dept: FAMILY MEDICINE CLINIC | Facility: CLINIC | Age: 60
End: 2020-07-07

## 2020-07-07 VITALS
BODY MASS INDEX: 30.38 KG/M2 | DIASTOLIC BLOOD PRESSURE: 67 MMHG | SYSTOLIC BLOOD PRESSURE: 109 MMHG | TEMPERATURE: 97.5 F | WEIGHT: 229.2 LBS | OXYGEN SATURATION: 98 % | HEIGHT: 73 IN | HEART RATE: 60 BPM

## 2020-07-07 DIAGNOSIS — E78.2 MIXED HYPERLIPIDEMIA: Primary | ICD-10-CM

## 2020-07-07 PROCEDURE — 99213 OFFICE O/P EST LOW 20 MIN: CPT | Performed by: FAMILY MEDICINE

## 2020-07-07 NOTE — PATIENT INSTRUCTIONS
Mediterranean Diet  A Mediterranean diet refers to food and lifestyle choices that are based on the traditions of countries located on the [place]. This way of eating has been shown to help prevent certain conditions and improve outcomes for people who have chronic diseases, like kidney disease and heart disease.  What are tips for following this plan?  Lifestyle  · Cook and eat meals together with your family, when possible.  · Drink enough fluid to keep your urine clear or pale yellow.  · Be physically active every day. This includes:  ? Aerobic exercise like running or swimming.  ? Leisure activities like gardening, walking, or housework.  · Get 7-8 hours of sleep each night.  · If recommended by your health care provider, drink red wine in moderation. This means 1 glass a day for nonpregnant women and 2 glasses a day for men. A glass of wine equals 5 oz (150 mL).  Reading food labels    · Check the serving size of packaged foods. For foods such as rice and pasta, the serving size refers to the amount of cooked product, not dry.  · Check the total fat in packaged foods. Avoid foods that have saturated fat or trans fats.  · Check the ingredients list for added sugars, such as corn syrup.  Shopping  · At the grocery store, buy most of your food from the areas near the walls of the store. This includes:  ? Fresh fruits and vegetables (produce).  ? Grains, beans, nuts, and seeds. Some of these may be available in unpackaged forms or large amounts (in bulk).  ? Fresh seafood.  ? Poultry and eggs.  ? Low-fat dairy products.  · Buy whole ingredients instead of prepackaged foods.  · Buy fresh fruits and vegetables in-season from local farmers markets.  · Buy frozen fruits and vegetables in resealable bags.  · If you do not have access to quality fresh seafood, buy precooked frozen shrimp or canned fish, such as tuna, salmon, or sardines.  · Buy small amounts of raw or cooked vegetables, salads, or olives from  the deli or salad bar at your store.  · Stock your pantry so you always have certain foods on hand, such as olive oil, canned tuna, canned tomatoes, rice, pasta, and beans.  Cooking  · Cook foods with extra-virgin olive oil instead of using butter or other vegetable oils.  · Have meat as a side dish, and have vegetables or grains as your main dish. This means having meat in small portions or adding small amounts of meat to foods like pasta or stew.  · Use beans or vegetables instead of meat in common dishes like chili or lasagna.  · [place] with different cooking methods. Try roasting or broiling vegetables instead of steaming or sautéeing them.  · Add frozen vegetables to soups, stews, pasta, or rice.  · Add nuts or seeds for added healthy fat at each meal. You can add these to yogurt, salads, or vegetable dishes.  · Marinate fish or vegetables using olive oil, lemon juice, garlic, and fresh herbs.  Meal planning    · Plan to eat 1 vegetarian meal one day each week. Try to work up to 2 vegetarian meals, if possible.  · Eat seafood 2 or more times a week.  · Have healthy snacks readily available, such as:  ? Vegetable sticks with hummus.  ? Greek yogurt.  ? Fruit and nut trail mix.  · Eat balanced meals throughout the week. This includes:  ? Fruit: 2-3 servings a day  ? Vegetables: 4-5 servings a day  ? Low-fat dairy: 2 servings a day  ? Fish, poultry, or lean meat: 1 serving a day  ? Beans and legumes: 2 or more servings a week  ? Nuts and seeds: 1-2 servings a day  ? Whole grains: 6-8 servings a day  ? Extra-virgin olive oil: 3-4 servings a day  · Limit red meat and sweets to only a few servings a month  What are my food choices?  · Mediterranean diet  ? Recommended  § Grains: Whole-grain pasta. Brown rice. Bulgar wheat. Polenta. Couscous. Whole-wheat bread. Oatmeal. Quinoa.  § Vegetables: Artichokes. Beets. Broccoli. Cabbage. Carrots. Eggplant. Green beans. Chard. Kale. Spinach. Onions. Leeks. Peas. Squash.  Tomatoes. Peppers. Radishes.  § Fruits: Apples. Apricots. Avocado. Berries. Bananas. Cherries. Dates. Figs. Grapes. Milli. Melon. Oranges. Peaches. Plums. Pomegranate.  § Meats and other protein foods: Beans. Almonds. Sunflower seeds. Pine nuts. Peanuts. Cod. [place]. Scallops. Shrimp. Tuna. Tilapia. Clams. Oysters. Eggs.  § Dairy: Low-fat milk. Cheese. Greek yogurt.  § Beverages: Water. Red wine. Herbal tea.  § Fats and oils: Extra virgin olive oil. Avocado oil. Grape seed oil.  § Sweets and desserts: Greek yogurt with honey. Baked apples. Poached pears. Trail mix.  § Seasoning and other foods: Basil. Cilantro. Coriander. Cumin. Mint. Parsley. Marcin. Rosemary. Tarragon. Garlic. Oregano. Thyme. Pepper. Balsalmic vinegar. Tahini. Hummus. Tomato sauce. Olives. Mushrooms.  ? Limit these  § Grains: Prepackaged pasta or rice dishes. Prepackaged cereal with added sugar.  § Vegetables: Deep fried potatoes (french fries).  § Fruits: Fruit canned in syrup.  § Meats and other protein foods: Beef. Pork. Lamb. Poultry with skin. Hot dogs. [place].  § Dairy: Ice cream. Sour cream. Whole milk.  § Beverages: Juice. Sugar-sweetened soft drinks. Beer. Liquor and spirits.  § Fats and oils: Butter. Canola oil. Vegetable oil. Beef fat (tallow). Lard.  § Sweets and desserts: Cookies. Cakes. Pies. Candy.  § Seasoning and other foods: Mayonnaise. Premade sauces and marinades.  The items listed may not be a complete list. Talk with your dietitian about what dietary choices are right for you.  Summary  · The Mediterranean diet includes both food and lifestyle choices.  · Eat a variety of fresh fruits and vegetables, beans, nuts, seeds, and whole grains.  · Limit the amount of red meat and sweets that you eat.  · Talk with your health care provider about whether it is safe for you to drink red wine in moderation. This means 1 glass a day for nonpregnant women and 2 glasses a day for men. A glass of wine equals 5 oz (150 mL).  This information  is not intended to replace advice given to you by your health care provider. Make sure you discuss any questions you have with your health care provider.  Document Released: 08/10/2017 Document Revised: 08/17/2017 Document Reviewed: 08/10/2017  Elsevier Patient Education © 2020 [employer]

## 2020-07-07 NOTE — PROGRESS NOTES
Subjective   Gregorio Callahan is a 60 y.o. male.     60-year-old male with history of ischemic heart disease and hyperlipidemia-especially triglycerides-never got vascepa filled    Hyperlipidemia   This is a chronic problem. The current episode started more than 1 year ago. The problem is resistant. Recent lipid tests were reviewed and are variable. Exacerbating diseases include obesity. There are no known factors aggravating his hyperlipidemia. Pertinent negatives include no chest pain or shortness of breath. Current antihyperlipidemic treatment includes diet change and ezetimibe. The current treatment provides mild improvement of lipids. Compliance problems include adherence to diet and adherence to exercise.  Risk factors for coronary artery disease include family history, hypertension, male sex, obesity and a sedentary lifestyle.       The following portions of the patient's history were reviewed and updated as appropriate: allergies, current medications, past family history, past medical history, past social history, past surgical history and problem list.    Review of Systems   Respiratory: Negative for shortness of breath.    Cardiovascular: Negative for chest pain and leg swelling.   Musculoskeletal: Positive for back pain.        L4 radiculopathy is improving       Objective   Physical Exam   Constitutional: He is oriented to person, place, and time.   Neck:   Good carotid pulses   Cardiovascular: Normal rate and regular rhythm.   Pulmonary/Chest: Effort normal and breath sounds normal.   Abdominal: Soft.   Musculoskeletal: He exhibits no edema.   Neurological: He is alert and oriented to person, place, and time.   Psychiatric: He has a normal mood and affect. His behavior is normal. Judgment and thought content normal.   Nursing note and vitals reviewed.      Assessment/Plan   Gregorio was seen today for follow-up.    Diagnoses and all orders for this visit:    Mixed hyperlipidemia  -     Comprehensive Metabolic  Panel  -     Lipid Panel       Plan above- COVID the safety precautions, early flu shot

## 2020-07-08 LAB
ALBUMIN SERPL-MCNC: 4.9 G/DL (ref 3.5–5.2)
ALBUMIN/GLOB SERPL: 2.5 G/DL
ALP SERPL-CCNC: 85 U/L (ref 39–117)
ALT SERPL-CCNC: 23 U/L (ref 1–41)
AST SERPL-CCNC: 30 U/L (ref 1–40)
BILIRUB SERPL-MCNC: 0.5 MG/DL (ref 0–1.2)
BUN SERPL-MCNC: 14 MG/DL (ref 6–20)
BUN/CREAT SERPL: 14.1 (ref 7–25)
CALCIUM SERPL-MCNC: 9.4 MG/DL (ref 8.6–10.5)
CHLORIDE SERPL-SCNC: 101 MMOL/L (ref 98–107)
CHOLEST SERPL-MCNC: 131 MG/DL (ref 0–200)
CO2 SERPL-SCNC: 23.8 MMOL/L (ref 22–29)
CREAT SERPL-MCNC: 0.99 MG/DL (ref 0.76–1.27)
GLOBULIN SER CALC-MCNC: 2 GM/DL
GLUCOSE SERPL-MCNC: 88 MG/DL (ref 65–99)
HDLC SERPL-MCNC: 39 MG/DL (ref 40–60)
LDLC SERPL CALC-MCNC: 63 MG/DL (ref 0–100)
POTASSIUM SERPL-SCNC: 4.5 MMOL/L (ref 3.5–5.2)
PROT SERPL-MCNC: 6.9 G/DL (ref 6–8.5)
SODIUM SERPL-SCNC: 139 MMOL/L (ref 136–145)
TRIGL SERPL-MCNC: 144 MG/DL (ref 0–150)
VLDLC SERPL CALC-MCNC: 28.8 MG/DL

## 2020-08-28 ENCOUNTER — FLU SHOT (OUTPATIENT)
Dept: FAMILY MEDICINE CLINIC | Facility: CLINIC | Age: 60
End: 2020-08-28

## 2020-08-28 DIAGNOSIS — Z23 NEED FOR INFLUENZA VACCINATION: ICD-10-CM

## 2020-08-28 PROCEDURE — 90653 IIV ADJUVANT VACCINE IM: CPT | Performed by: FAMILY MEDICINE

## 2020-08-28 PROCEDURE — 90471 IMMUNIZATION ADMIN: CPT | Performed by: FAMILY MEDICINE

## 2020-11-03 RX ORDER — TRAZODONE HYDROCHLORIDE 50 MG/1
TABLET ORAL
Qty: 180 TABLET | Refills: 0 | Status: SHIPPED | OUTPATIENT
Start: 2020-11-03 | End: 2021-01-25

## 2020-12-15 RX ORDER — CITALOPRAM 40 MG/1
TABLET ORAL
Qty: 90 TABLET | Refills: 0 | Status: SHIPPED | OUTPATIENT
Start: 2020-12-15 | End: 2021-03-11

## 2020-12-21 RX ORDER — CAPTOPRIL 25 MG/1
25 TABLET ORAL 2 TIMES DAILY
Qty: 180 TABLET | Refills: 3 | Status: SHIPPED | OUTPATIENT
Start: 2020-12-21 | End: 2021-12-13

## 2020-12-23 DIAGNOSIS — E78.2 MIXED HYPERLIPIDEMIA: ICD-10-CM

## 2020-12-23 RX ORDER — EZETIMIBE 10 MG/1
TABLET ORAL
Qty: 90 TABLET | Refills: 3 | Status: SHIPPED | OUTPATIENT
Start: 2020-12-23 | End: 2021-12-21

## 2021-01-12 ENCOUNTER — OFFICE VISIT (OUTPATIENT)
Dept: CARDIOLOGY | Facility: CLINIC | Age: 61
End: 2021-01-12

## 2021-01-12 VITALS
WEIGHT: 233 LBS | HEIGHT: 73 IN | HEART RATE: 77 BPM | DIASTOLIC BLOOD PRESSURE: 60 MMHG | SYSTOLIC BLOOD PRESSURE: 100 MMHG | BODY MASS INDEX: 30.88 KG/M2

## 2021-01-12 DIAGNOSIS — E78.5 HYPERLIPIDEMIA, MILD: ICD-10-CM

## 2021-01-12 DIAGNOSIS — I10 BENIGN ESSENTIAL HYPERTENSION: ICD-10-CM

## 2021-01-12 DIAGNOSIS — I25.10 CAD IN NATIVE ARTERY: Primary | ICD-10-CM

## 2021-01-12 PROCEDURE — 93000 ELECTROCARDIOGRAM COMPLETE: CPT | Performed by: INTERNAL MEDICINE

## 2021-01-12 PROCEDURE — 99213 OFFICE O/P EST LOW 20 MIN: CPT | Performed by: INTERNAL MEDICINE

## 2021-01-12 NOTE — PROGRESS NOTES
Subjective    Gregorio Callahan is a 60 y.o. male. Fu of ihd and risks    History of Present Illness     IHD:  Is not very active per his preference. Has no cp or unusual soa. Has recovered from COVID ok. EKG is nsc today. Is compliant with meds but not with diet per his preference.    HTN:  Does not check at home but clinic checks are all good    HLD:  On current statin tx his LDL=63 pm 20. Has no myallgias        The following portions of the patient's history were reviewed and updated as appropriate: allergies, current medications, past family history, past medical history, past social history, past surgical history and problem list.    Patient Active Problem List   Diagnosis   • Hyperlipidemia, mild   • Benign essential hypertension   • CAD in native artery   • H/O acute myocardial infarction   • History of PTCA       No Known Allergies    Family History   Problem Relation Age of Onset   • Heart disease Mother    • Heart attack Sister    • Alzheimer's disease Father    • No Known Problems Maternal Grandmother    • No Known Problems Maternal Grandfather    • No Known Problems Paternal Grandmother    • No Known Problems Paternal Grandfather        Social History     Socioeconomic History   • Marital status:      Spouse name: Not on file   • Number of children: Not on file   • Years of education: Not on file   • Highest education level: Not on file   Tobacco Use   • Smoking status: Former Smoker     Packs/day: 3.00     Years: 30.00     Pack years: 90.00     Types: Cigarettes     Quit date: 2007     Years since quittin.0   • Smokeless tobacco: Never Used   • Tobacco comment: age quit smokin   Substance and Sexual Activity   • Alcohol use: Yes     Comment: Occasional alcohol use. Drinks beer.   • Drug use: No   • Sexual activity: Defer         Current Outpatient Medications:   •  aspirin 81 MG EC tablet, Take 81 mg by mouth Daily., Disp: , Rfl:   •  atorvastatin (LIPITOR) 80 MG tablet, TAKE 1  TABLET BY MOUTH EVERY DAY, Disp: 90 tablet, Rfl: 3  •  captopril (CAPOTEN) 25 MG tablet, Take 1 tablet by mouth 2 (Two) Times a Day., Disp: 180 tablet, Rfl: 3  •  CHONDROITIN SULFATE A PO, Take  by mouth., Disp: , Rfl:   •  citalopram (CeleXA) 40 MG tablet, TAKE 1 TABLET BY MOUTH EVERY DAY, Disp: 90 tablet, Rfl: 0  •  ezetimibe (ZETIA) 10 MG tablet, TAKE 1 TABLET BY MOUTH EVERY DAY, Disp: 90 tablet, Rfl: 3  •  Ginkgo Biloba (GINKOBA) 40 MG tablet, Take 40 mg by mouth Daily., Disp: , Rfl:   •  Glucosamine HCl 1000 MG tablet, Take  by mouth., Disp: , Rfl:   •  ibuprofen (ADVIL) 200 MG tablet, Take 200 mg by mouth Every 6 (Six) Hours As Needed for Mild Pain ., Disp: , Rfl:   •  nitroglycerin (NITROSTAT) 0.4 MG SL tablet, TAKE 1 TABLET BY MOUTH EVERY 5 MINUTES UP TO 3 TIMES AS NEEDED FOR CHEST PAIN., Disp: 25 tablet, Rfl: 2  •  Nutritional Supplements (OSTEO ADVANCE) tablet, Take  by mouth 2 (Two) Times a Day., Disp: , Rfl:   •  Potassium 99 MG tablet, Take 99 mg by mouth Daily., Disp: , Rfl:   •  traZODone (DESYREL) 50 MG tablet, TAKE 1 OR 2 TABLETS BY MOUTH AT BEDTIME AS NEEDED, Disp: 180 tablet, Rfl: 0  •  VIAGRA 100 MG tablet, TAKE AS DIRECTED, Disp: 6 tablet, Rfl: 3  •  icosapent ethyl (VASCEPA) 1 g capsule capsule, Take 2 g by mouth 2 (Two) Times a Day With Meals., Disp: 360 capsule, Rfl: 3    Past Surgical History:   Procedure Laterality Date   • CARDIAC CATHETERIZATION     • CORONARY STENT PLACEMENT      x1       Review of Systems   Constitutional: Negative for activity change, appetite change, fatigue and unexpected weight change.   Respiratory: Negative for apnea, chest tightness and shortness of breath.    Cardiovascular: Negative for chest pain, palpitations and leg swelling.   Gastrointestinal: Negative for abdominal pain and blood in stool.   Genitourinary: Negative for dysuria and hematuria.   Musculoskeletal: Negative for myalgias.   Neurological: Negative for weakness and light-headedness.  "  Psychiatric/Behavioral: Negative for sleep disturbance.       /60   Pulse 77   Ht 185.4 cm (73\")   Wt 106 kg (233 lb)   BMI 30.74 kg/m²       Objective   Physical Exam  Constitutional:       Appearance: Normal appearance.   Eyes:      Pupils: Pupils are equal, round, and reactive to light.   Cardiovascular:      Rate and Rhythm: Normal rate and regular rhythm.      Pulses: Normal pulses.      Heart sounds: Normal heart sounds. No murmur. No friction rub. No gallop.    Pulmonary:      Effort: Pulmonary effort is normal. No respiratory distress.      Breath sounds: Normal breath sounds. No wheezing, rhonchi or rales.   Abdominal:      General: Bowel sounds are normal. There is no distension.      Palpations: Abdomen is soft.      Tenderness: There is no abdominal tenderness. There is no guarding.   Musculoskeletal:      Right lower leg: No edema.      Left lower leg: No edema.   Skin:     General: Skin is warm.   Neurological:      General: No focal deficit present.      Mental Status: He is alert and oriented to person, place, and time.   Psychiatric:         Mood and Affect: Mood normal.         Behavior: Behavior normal.         Assessment/Plan   Diagnoses and all orders for this visit:    1. CAD in native artery (Primary)  Comments:  no angina - non-adherence to diet and exercise  Orders:  -     ECG 12 Lead    2. Benign essential hypertension  Comments:  tight control    3. Hyperlipidemia, mild  Comments:  good control                 Return in about 18 months (around 7/12/2022) for Next scheduled follow up.  Orders Placed This Encounter   Procedures   • ECG 12 Lead     Order Specific Question:   Reason for Exam:     Answer:   [email or web address]     "

## 2021-01-14 ENCOUNTER — OFFICE VISIT (OUTPATIENT)
Dept: FAMILY MEDICINE CLINIC | Facility: CLINIC | Age: 61
End: 2021-01-14

## 2021-01-14 VITALS
RESPIRATION RATE: 16 BRPM | HEART RATE: 77 BPM | SYSTOLIC BLOOD PRESSURE: 120 MMHG | HEIGHT: 73 IN | DIASTOLIC BLOOD PRESSURE: 78 MMHG | WEIGHT: 228 LBS | TEMPERATURE: 98 F | OXYGEN SATURATION: 95 % | BODY MASS INDEX: 30.22 KG/M2

## 2021-01-14 DIAGNOSIS — I10 HYPERTENSION, UNSPECIFIED TYPE: ICD-10-CM

## 2021-01-14 DIAGNOSIS — Z12.5 SPECIAL SCREENING FOR MALIGNANT NEOPLASM OF PROSTATE: ICD-10-CM

## 2021-01-14 DIAGNOSIS — Z00.00 ROUTINE GENERAL MEDICAL EXAMINATION AT A HEALTH CARE FACILITY: Primary | ICD-10-CM

## 2021-01-14 LAB
BILIRUB BLD-MCNC: NEGATIVE MG/DL
CLARITY, POC: CLEAR
COLOR UR: YELLOW
GLUCOSE UR STRIP-MCNC: NEGATIVE MG/DL
KETONES UR QL: NEGATIVE
LEUKOCYTE EST, POC: NEGATIVE
NITRITE UR-MCNC: NEGATIVE MG/ML
PH UR: 6 [PH] (ref 5–8)
PROT UR STRIP-MCNC: NEGATIVE MG/DL
RBC # UR STRIP: NEGATIVE /UL
SP GR UR: 1.3 (ref 1–1.03)
UROBILINOGEN UR QL: NORMAL

## 2021-01-14 PROCEDURE — 99396 PREV VISIT EST AGE 40-64: CPT | Performed by: FAMILY MEDICINE

## 2021-01-14 PROCEDURE — 81003 URINALYSIS AUTO W/O SCOPE: CPT | Performed by: FAMILY MEDICINE

## 2021-01-14 NOTE — PATIENT INSTRUCTIONS
Mediterranean Diet  A Mediterranean diet refers to food and lifestyle choices that are based on the traditions of countries located on the [place]. This way of eating has been shown to help prevent certain conditions and improve outcomes for people who have chronic diseases, like kidney disease and heart disease.  What are tips for following this plan?  Lifestyle  · Cook and eat meals together with your family, when possible.  · Drink enough fluid to keep your urine clear or pale yellow.  · Be physically active every day. This includes:  ? Aerobic exercise like running or swimming.  ? Leisure activities like gardening, walking, or housework.  · Get 7-8 hours of sleep each night.  · If recommended by your health care provider, drink red wine in moderation. This means 1 glass a day for nonpregnant women and 2 glasses a day for men. A glass of wine equals 5 oz (150 mL).  Reading food labels    · Check the serving size of packaged foods. For foods such as rice and pasta, the serving size refers to the amount of cooked product, not dry.  · Check the total fat in packaged foods. Avoid foods that have saturated fat or trans fats.  · Check the ingredients list for added sugars, such as corn syrup.  Shopping  · At the grocery store, buy most of your food from the areas near the walls of the store. This includes:  ? Fresh fruits and vegetables (produce).  ? Grains, beans, nuts, and seeds. Some of these may be available in unpackaged forms or large amounts (in bulk).  ? Fresh seafood.  ? Poultry and eggs.  ? Low-fat dairy products.  · Buy whole ingredients instead of prepackaged foods.  · Buy fresh fruits and vegetables in-season from local farmers markets.  · Buy frozen fruits and vegetables in resealable bags.  · If you do not have access to quality fresh seafood, buy precooked frozen shrimp or canned fish, such as tuna, salmon, or sardines.  · Buy small amounts of raw or cooked vegetables, salads, or olives from  the deli or salad bar at your store.  · Stock your pantry so you always have certain foods on hand, such as olive oil, canned tuna, canned tomatoes, rice, pasta, and beans.  Cooking  · Cook foods with extra-virgin olive oil instead of using butter or other vegetable oils.  · Have meat as a side dish, and have vegetables or grains as your main dish. This means having meat in small portions or adding small amounts of meat to foods like pasta or stew.  · Use beans or vegetables instead of meat in common dishes like chili or lasagna.  · [place] with different cooking methods. Try roasting or broiling vegetables instead of steaming or sautéeing them.  · Add frozen vegetables to soups, stews, pasta, or rice.  · Add nuts or seeds for added healthy fat at each meal. You can add these to yogurt, salads, or vegetable dishes.  · Marinate fish or vegetables using olive oil, lemon juice, garlic, and fresh herbs.  Meal planning    · Plan to eat 1 vegetarian meal one day each week. Try to work up to 2 vegetarian meals, if possible.  · Eat seafood 2 or more times a week.  · Have healthy snacks readily available, such as:  ? Vegetable sticks with hummus.  ? Greek yogurt.  ? Fruit and nut trail mix.  · Eat balanced meals throughout the week. This includes:  ? Fruit: 2-3 servings a day  ? Vegetables: 4-5 servings a day  ? Low-fat dairy: 2 servings a day  ? Fish, poultry, or lean meat: 1 serving a day  ? Beans and legumes: 2 or more servings a week  ? Nuts and seeds: 1-2 servings a day  ? Whole grains: 6-8 servings a day  ? Extra-virgin olive oil: 3-4 servings a day  · Limit red meat and sweets to only a few servings a month  What are my food choices?  · Mediterranean diet  ? Recommended  § Grains: Whole-grain pasta. Brown rice. Bulgar wheat. Polenta. Couscous. Whole-wheat bread. Oatmeal. Quinoa.  § Vegetables: Artichokes. Beets. Broccoli. Cabbage. Carrots. Eggplant. Green beans. Chard. Kale. Spinach. Onions. Leeks. Peas. Squash.  Tomatoes. Peppers. Radishes.  § Fruits: Apples. Apricots. Avocado. Berries. Bananas. Cherries. Dates. Figs. Grapes. Milli. Melon. Oranges. Peaches. Plums. Pomegranate.  § Meats and other protein foods: Beans. Almonds. Sunflower seeds. Pine nuts. Peanuts. Cod. [place]. Scallops. Shrimp. Tuna. Tilapia. Clams. Oysters. Eggs.  § Dairy: Low-fat milk. Cheese. Greek yogurt.  § Beverages: Water. Red wine. Herbal tea.  § Fats and oils: Extra virgin olive oil. Avocado oil. Grape seed oil.  § Sweets and desserts: Greek yogurt with honey. Baked apples. Poached pears. Trail mix.  § Seasoning and other foods: Basil. Cilantro. Coriander. Cumin. Mint. Parsley. Marcin. Rosemary. Tarragon. Garlic. Oregano. Thyme. Pepper. Balsalmic vinegar. Tahini. Hummus. Tomato sauce. Olives. Mushrooms.  ? Limit these  § Grains: Prepackaged pasta or rice dishes. Prepackaged cereal with added sugar.  § Vegetables: Deep fried potatoes (french fries).  § Fruits: Fruit canned in syrup.  § Meats and other protein foods: Beef. Pork. Lamb. Poultry with skin. Hot dogs. [place].  § Dairy: Ice cream. Sour cream. Whole milk.  § Beverages: Juice. Sugar-sweetened soft drinks. Beer. Liquor and spirits.  § Fats and oils: Butter. Canola oil. Vegetable oil. Beef fat (tallow). Lard.  § Sweets and desserts: Cookies. Cakes. Pies. Candy.  § Seasoning and other foods: Mayonnaise. Premade sauces and marinades.  The items listed may not be a complete list. Talk with your dietitian about what dietary choices are right for you.  Summary  · The Mediterranean diet includes both food and lifestyle choices.  · Eat a variety of fresh fruits and vegetables, beans, nuts, seeds, and whole grains.  · Limit the amount of red meat and sweets that you eat.  · Talk with your health care provider about whether it is safe for you to drink red wine in moderation. This means 1 glass a day for nonpregnant women and 2 glasses a day for men. A glass of wine equals 5 oz (150 mL).  This information  is not intended to replace advice given to you by your health care provider. Make sure you discuss any questions you have with your health care provider.  Document Revised: 08/17/2017 Document Reviewed: 08/10/2017  [employer] Patient Education © 2020 [employer]  Exercising to Stay Healthy  To become healthy and stay healthy, it is recommended that you do moderate-intensity and vigorous-intensity exercise. You can tell that you are exercising at a moderate intensity if your heart starts beating faster and you start breathing faster but can still hold a conversation. You can tell that you are exercising at a vigorous intensity if you are breathing much harder and faster and cannot hold a conversation while exercising.  Exercising regularly is important. It has many health benefits, such as:  · Improving overall fitness, flexibility, and endurance.  · Increasing bone density.  · Helping with weight control.  · Decreasing body fat.  · Increasing muscle strength.  · Reducing stress and tension.  · Improving overall health.  How often should I exercise?  Choose an activity that you enjoy, and set realistic goals. Your health care provider can help you make an activity plan that works for you.  Exercise regularly as told by your health care provider. This may include:  · Doing strength training two times a week, such as:  ? Lifting weights.  ? Using resistance bands.  ? Push-ups.  ? Sit-ups.  ? Yoga.  · Doing a certain intensity of exercise for a given amount of time. Choose from these options:  ? A total of 150 minutes of moderate-intensity exercise every week.  ? A total of 75 minutes of vigorous-intensity exercise every week.  ? A mix of moderate-intensity and vigorous-intensity exercise every week.  Children, pregnant women, people who have not exercised regularly, people who are overweight, and older adults may need to talk with a health care provider about what activities are safe to do. If you have a medical  condition, be sure to talk with your health care provider before you start a new exercise program.  What are some exercise ideas?  Moderate-intensity exercise ideas include:  · Walking 1 mile (1.6 km) in about 15 minutes.  · Biking.  · Hiking.  · Golfing.  · Dancing.  · Water aerobics.  Vigorous-intensity exercise ideas include:  · Walking 4.5 miles (7.2 km) or more in about 1 hour.  · Jogging or running 5 miles (8 km) in about 1 hour.  · Biking 10 miles (16.1 km) or more in about 1 hour.  · Lap swimming.  · Roller-skating or in-line skating.  · Cross-country skiing.  · Vigorous competitive sports, such as football, basketball, and soccer.  · Jumping rope.  · Aerobic dancing.  What are some everyday activities that can help me to get exercise?  · Yard work, such as:  ? Pushing a .  ? Raking and bagging leaves.  · Washing your car.  · Pushing a stroller.  · Shoveling snow.  · Gardening.  · Washing windows or floors.  How can I be more active in my day-to-day activities?  · Use stairs instead of an elevator.  · Take a walk during your lunch break.  · If you drive, park your car farther away from your work or school.  · If you take public transportation, get off one stop early and walk the rest of the way.  · Stand up or walk around during all of your indoor phone calls.  · Get up, stretch, and walk around every 30 minutes throughout the day.  · Enjoy exercise with a friend. Support to continue exercising will help you keep a regular routine of activity.  What guidelines can I follow while exercising?  · Before you start a new exercise program, talk with your health care provider.  · Do not exercise so much that you hurt yourself, feel dizzy, or get very short of breath.  · Wear comfortable clothes and wear shoes with good support.  · Drink plenty of water while you exercise to prevent dehydration or heat stroke.  · Work out until your breathing and your heartbeat get faster.  Where to find more  information  · [place] Department of Health and Human Services: www.hhs.gov  · Centers for Disease Control and Prevention (CDC): www.cdc.gov  Summary  · Exercising regularly is important. It will improve your overall fitness, flexibility, and endurance.  · Regular exercise also will improve your overall health. It can help you control your weight, reduce stress, and improve your bone density.  · Do not exercise so much that you hurt yourself, feel dizzy, or get very short of breath.  · Before you start a new exercise program, talk with your health care provider.  This information is not intended to replace advice given to you by your health care provider. Make sure you discuss any questions you have with your health care provider.  Document Revised: 11/30/2018 Document Reviewed: 11/08/2018  [employer] Patient Education © 2020 [employer]

## 2021-01-14 NOTE — PROGRESS NOTES
Subjective   Gregorio Callahan is a 60 y.o. male.     60-year-old male with ischemic heart disease hypertension hyperlipidemia    Hyperlipidemia  This is a chronic problem. The current episode started more than 1 year ago. The problem is controlled. Recent lipid tests were reviewed and are normal. Exacerbating diseases include obesity. There are no known factors aggravating his hyperlipidemia. Pertinent negatives include no chest pain. Current antihyperlipidemic treatment includes diet change, statins and ezetimibe. The current treatment provides moderate improvement of lipids. Compliance problems include adherence to exercise and adherence to diet.  Risk factors for coronary artery disease include dyslipidemia, male sex, hypertension, a sedentary lifestyle and obesity.        The following portions of the patient's history were reviewed and updated as appropriate: allergies, current medications, past family history, past medical history, past social history, past surgical history and problem list.    Review of Systems   Respiratory:        Had COVID-19 mid December--advise low-dose CT of chest-wants to wait   Cardiovascular: Negative for chest pain and leg swelling.        Sees cardiologist   Gastrointestinal:        Cologuard -2018   Genitourinary: Negative for difficulty urinating.   All other systems reviewed and are negative.      Objective   Physical Exam  Vitals signs and nursing note reviewed.   Constitutional:       Appearance: He is obese.   HENT:      Right Ear: Tympanic membrane and ear canal normal.      Left Ear: Tympanic membrane and ear canal normal.   Eyes:      Extraocular Movements: Extraocular movements intact.      Pupils: Pupils are equal, round, and reactive to light.   Neck:      Vascular: No carotid bruit.   Cardiovascular:      Rate and Rhythm: Normal rate and regular rhythm.      Pulses: Normal pulses.      Heart sounds: Normal heart sounds.   Pulmonary:      Effort: Pulmonary effort is normal.       Breath sounds: Normal breath sounds.   Abdominal:      General: Abdomen is flat.      Palpations: Abdomen is soft. There is no mass.   Genitourinary:     Penis: Normal.       Scrotum/Testes: Normal.      Prostate: Normal.      Rectum: Normal.      Comments: No testicular masses inguinal hernia or adenopathy-prostate 2+ no nodules  Musculoskeletal:      Right lower leg: No edema.      Left lower leg: No edema.   Lymphadenopathy:      Cervical: No cervical adenopathy.   Skin:     General: Skin is warm and dry.      Capillary Refill: Capillary refill takes less than 2 seconds.   Neurological:      General: No focal deficit present.      Mental Status: He is alert and oriented to person, place, and time.   Psychiatric:         Mood and Affect: Mood normal.         Behavior: Behavior normal.         Thought Content: Thought content normal.         Judgment: Judgment normal.         Assessment/Plan   Problems Addressed this Visit     None      Visit Diagnoses     Routine general medical examination at a health care facility    -  Primary    Relevant Orders    TSH    T4, free    CBC & Differential    Comprehensive Metabolic Panel    Lipid Panel    Hypertension, unspecified type        Relevant Orders    POC Urinalysis Dipstick, Multipro (Completed)    Special screening for malignant neoplasm of prostate        Relevant Orders    PSA Screen      Diagnoses       Codes Comments    Routine general medical examination at a health care facility    -  Primary ICD-10-CM: Z00.00  ICD-9-CM: V70.0     Hypertension, unspecified type     ICD-10-CM: I10  ICD-9-CM: 401.9     Special screening for malignant neoplasm of prostate     ICD-10-CM: Z12.5  ICD-9-CM: V76.44           Plan above-low-dose CT of chest recommended-going to get back with me this summer

## 2021-01-15 LAB
ALBUMIN SERPL-MCNC: 4.9 G/DL (ref 3.5–5.2)
ALBUMIN/GLOB SERPL: 1.9 G/DL
ALP SERPL-CCNC: 93 U/L (ref 39–117)
ALT SERPL-CCNC: 41 U/L (ref 1–41)
AST SERPL-CCNC: 38 U/L (ref 1–40)
BASOPHILS # BLD AUTO: 0.02 10*3/MM3 (ref 0–0.2)
BASOPHILS NFR BLD AUTO: 0.4 % (ref 0–1.5)
BILIRUB SERPL-MCNC: 0.9 MG/DL (ref 0–1.2)
BUN SERPL-MCNC: 16 MG/DL (ref 6–20)
BUN/CREAT SERPL: 14.7 (ref 7–25)
CALCIUM SERPL-MCNC: 9.6 MG/DL (ref 8.6–10.5)
CHLORIDE SERPL-SCNC: 100 MMOL/L (ref 98–107)
CHOLEST SERPL-MCNC: 121 MG/DL (ref 0–200)
CO2 SERPL-SCNC: 28.3 MMOL/L (ref 22–29)
CREAT SERPL-MCNC: 1.09 MG/DL (ref 0.76–1.27)
EOSINOPHIL # BLD AUTO: 0.03 10*3/MM3 (ref 0–0.4)
EOSINOPHIL NFR BLD AUTO: 0.6 % (ref 0.3–6.2)
ERYTHROCYTE [DISTWIDTH] IN BLOOD BY AUTOMATED COUNT: 13.1 % (ref 12.3–15.4)
GLOBULIN SER CALC-MCNC: 2.6 GM/DL
GLUCOSE SERPL-MCNC: 80 MG/DL (ref 65–99)
HCT VFR BLD AUTO: 42.9 % (ref 37.5–51)
HDLC SERPL-MCNC: 32 MG/DL (ref 40–60)
HGB BLD-MCNC: 14.3 G/DL (ref 13–17.7)
IMM GRANULOCYTES # BLD AUTO: 0.02 10*3/MM3 (ref 0–0.05)
IMM GRANULOCYTES NFR BLD AUTO: 0.4 % (ref 0–0.5)
LDLC SERPL CALC-MCNC: 67 MG/DL (ref 0–100)
LYMPHOCYTES # BLD AUTO: 1.18 10*3/MM3 (ref 0.7–3.1)
LYMPHOCYTES NFR BLD AUTO: 23.1 % (ref 19.6–45.3)
MCH RBC QN AUTO: 30.6 PG (ref 26.6–33)
MCHC RBC AUTO-ENTMCNC: 33.3 G/DL (ref 31.5–35.7)
MCV RBC AUTO: 91.7 FL (ref 79–97)
MONOCYTES # BLD AUTO: 0.82 10*3/MM3 (ref 0.1–0.9)
MONOCYTES NFR BLD AUTO: 16.1 % (ref 5–12)
NEUTROPHILS # BLD AUTO: 3.03 10*3/MM3 (ref 1.7–7)
NEUTROPHILS NFR BLD AUTO: 59.4 % (ref 42.7–76)
NRBC BLD AUTO-RTO: 0 /100 WBC (ref 0–0.2)
PLATELET # BLD AUTO: 192 10*3/MM3 (ref 140–450)
POTASSIUM SERPL-SCNC: 4.8 MMOL/L (ref 3.5–5.2)
PROT SERPL-MCNC: 7.5 G/DL (ref 6–8.5)
PSA SERPL-MCNC: 0.56 NG/ML (ref 0–4)
RBC # BLD AUTO: 4.68 10*6/MM3 (ref 4.14–5.8)
SODIUM SERPL-SCNC: 140 MMOL/L (ref 136–145)
T4 FREE SERPL-MCNC: 0.99 NG/DL (ref 0.93–1.7)
TRIGL SERPL-MCNC: 123 MG/DL (ref 0–150)
TSH SERPL DL<=0.005 MIU/L-ACNC: 1.41 UIU/ML (ref 0.27–4.2)
VLDLC SERPL CALC-MCNC: 22 MG/DL (ref 5–40)
WBC # BLD AUTO: 5.1 10*3/MM3 (ref 3.4–10.8)

## 2021-01-25 RX ORDER — TRAZODONE HYDROCHLORIDE 50 MG/1
TABLET ORAL
Qty: 180 TABLET | Refills: 3 | Status: SHIPPED | OUTPATIENT
Start: 2021-01-25 | End: 2022-04-22

## 2021-03-10 ENCOUNTER — TELEPHONE (OUTPATIENT)
Dept: FAMILY MEDICINE CLINIC | Facility: CLINIC | Age: 61
End: 2021-03-10

## 2021-03-10 DIAGNOSIS — Z87.891 HISTORY OF NICOTINE DEPENDENCE: ICD-10-CM

## 2021-03-10 DIAGNOSIS — Z12.2 ENCOUNTER FOR SCREENING FOR LUNG CANCER: ICD-10-CM

## 2021-03-10 NOTE — PROGRESS NOTES
Low-Dose Lung Cancer CT Screening Visit    CHIEF COMPLAINT:    Shared Decision Making  I am discussing tobacco cessation today with Gregorio Callahan    SMOKING HISTORY:     Social History     Tobacco Use   Smoking Status Former Smoker   • Packs/day: 3.00   • Years: 30.00   • Pack years: 90.00   • Types: Cigarettes   • Quit date: 2007   • Years since quittin.1   Smokeless Tobacco Never Used   Tobacco Comment    age quit smokin       SUBJECTIVE:     Gregorio Callahan is a former smoker quitting 14 years ago with a  90  pack year history.  he reports no use of alternate forms of tobacco, electronic cigarettes, marijuana or other substances.  Based on the recommendation of the [place] Preventive Services Task Force, this patient is at high risk for lung cancer and a low-dose CT screening scan is recommended.     The patient has had no hemoptysis, unintentional weight loss or increasing shortness of breath. The patient is asymptomatic and has no signs or symptoms of lung cancer.     Together we discussed the potential benefits and potential harms of being screened for lung cancer including the potential for follow up diagnostic testing, risk for over diagnosis, false positive rate and radiation exposure using the AHRQ: Is Lung Cancer Screening Right for Me Decision Aid (Publication [id number], web site www.ahrq.gov). A copy of this decision aid resource has been provided in the after visit summary.  We also reviewed the patient's smoking history and counseled him on the importance and health benefits of maintaining cigarette smoking abstinence.      OBJECTIVE:    There were no vitals taken for this visit.  General: no distress, alert and oriented  Chest: Lung sounds are clear to auscultation, no wheezes, rales or rhonchi, with symmetric air entry. No respiratory distress  Cardiovascular: RRR with no murmur auscultated      Continued Smoking Abstinence discussion:     We discussed that there are a  number of resources and interventions to assist with smoking cessation if needed in the future including the 1-800-Quit Now line.(Included in the decision aid shared with the patient today).   On a scale of zero to ten, the patient rates their motivation to stay quit at a 10 out of 10 today.  The patient is confident that they will never smoke in the future.    Recommendations for continued lung cancer screening:      We discussed the NCCN guidelines for lung cancer screening and the patient verbalized understanding that annual screening is recommended until fifteen years beyond smoking as long as they have no other disease or comorbidity that would prevent them from receiving cancer treatments such as surgery should a lung cancer be detected.  After review of the NCCN guidelines and recommendations for ongoing screening, the patient verbalized understanding of recommendations for follow-up.  The patient has decided to proceed with a Low Dose Lung Cancer Screening CT today.       5minutes face-to-face spent counseling patient on the continued health benefits of having quit tobacco, maintaining smoking abstinence, smoking cessation strategies and resources, as well as the importance of adherence to annual lung cancer low-dose CT screening.

## 2021-03-10 NOTE — PATIENT INSTRUCTIONS
Please review the decision aid used during our discussion regarding the Low dose lung cancer screening visit today.                          Please review the decision aid used during our discussion regarding the Low dose lung cancer screening visit today.

## 2021-03-11 RX ORDER — CITALOPRAM 40 MG/1
TABLET ORAL
Qty: 90 TABLET | Refills: 2 | Status: SHIPPED | OUTPATIENT
Start: 2021-03-11 | End: 2021-11-29

## 2021-03-24 ENCOUNTER — HOSPITAL ENCOUNTER (OUTPATIENT)
Dept: CT IMAGING | Facility: HOSPITAL | Age: 61
Discharge: HOME | End: 2021-03-24
Admitting: FAMILY MEDICINE

## 2021-03-24 ENCOUNTER — DOCUMENTATION (OUTPATIENT)
Dept: CT IMAGING | Facility: HOSPITAL | Age: 61
End: 2021-03-24

## 2021-03-24 DIAGNOSIS — Z12.2 ENCOUNTER FOR SCREENING FOR LUNG CANCER: ICD-10-CM

## 2021-03-24 DIAGNOSIS — R91.1 LUNG NODULE SEEN ON IMAGING STUDY: Primary | ICD-10-CM

## 2021-03-24 DIAGNOSIS — Z87.891 HISTORY OF NICOTINE DEPENDENCE: ICD-10-CM

## 2021-03-24 PROCEDURE — 71271 CT THORAX LUNG CANCER SCR C-: CPT

## 2021-04-28 DIAGNOSIS — E78.00 PURE HYPERCHOLESTEROLEMIA: ICD-10-CM

## 2021-04-29 RX ORDER — ATORVASTATIN CALCIUM 80 MG/1
TABLET, FILM COATED ORAL
Qty: 90 TABLET | Refills: 3 | Status: SHIPPED | OUTPATIENT
Start: 2021-04-29 | End: 2022-04-20

## 2021-07-12 ENCOUNTER — APPOINTMENT (OUTPATIENT)
Dept: CT IMAGING | Facility: HOSPITAL | Age: 61
End: 2021-07-12

## 2021-07-19 ENCOUNTER — OFFICE VISIT (OUTPATIENT)
Dept: FAMILY MEDICINE CLINIC | Facility: CLINIC | Age: 61
End: 2021-07-19

## 2021-07-19 VITALS
HEIGHT: 73 IN | OXYGEN SATURATION: 98 % | BODY MASS INDEX: 29.55 KG/M2 | SYSTOLIC BLOOD PRESSURE: 115 MMHG | TEMPERATURE: 96.9 F | WEIGHT: 223 LBS | HEART RATE: 67 BPM | DIASTOLIC BLOOD PRESSURE: 74 MMHG

## 2021-07-19 DIAGNOSIS — E78.2 MIXED HYPERLIPIDEMIA: Primary | ICD-10-CM

## 2021-07-19 PROCEDURE — 99213 OFFICE O/P EST LOW 20 MIN: CPT | Performed by: FAMILY MEDICINE

## 2021-07-19 NOTE — PROGRESS NOTES
Subjective   Gregorio Callahan is a 61 y.o. male.     61-year-old male with history of hyperlipidemia ischemic heart disease      The following portions of the patient's history were reviewed and updated as appropriate: allergies, current medications, past family history, past medical history, past social history, past surgical history and problem list.    Review of Systems   Respiratory: Negative for shortness of breath.    Cardiovascular: Negative for chest pain and leg swelling.   Neurological: Negative for headaches.       Objective   Physical Exam  Vitals and nursing note reviewed.   Constitutional:       Appearance: Normal appearance.   HENT:      Ears:      Comments: Hearing devices  Cardiovascular:      Rate and Rhythm: Normal rate and regular rhythm.      Pulses: Normal pulses.      Heart sounds: Normal heart sounds.   Pulmonary:      Effort: Pulmonary effort is normal.      Breath sounds: Normal breath sounds.   Musculoskeletal:      Right lower leg: No edema.      Left lower leg: No edema.   Neurological:      General: No focal deficit present.      Mental Status: He is alert and oriented to person, place, and time.   Psychiatric:         Mood and Affect: Mood normal.         Behavior: Behavior normal.         Thought Content: Thought content normal.         Judgment: Judgment normal.         Assessment/Plan   Diagnoses and all orders for this visit:    1. Mixed hyperlipidemia (Primary)  -     Comprehensive Metabolic Panel  -     Lipid Panel

## 2021-07-20 LAB
ALBUMIN SERPL-MCNC: 4.7 G/DL (ref 3.5–5.2)
ALBUMIN/GLOB SERPL: 2 G/DL
ALP SERPL-CCNC: 95 U/L (ref 39–117)
ALT SERPL-CCNC: 28 U/L (ref 1–41)
AST SERPL-CCNC: 25 U/L (ref 1–40)
BILIRUB SERPL-MCNC: 0.3 MG/DL (ref 0–1.2)
BUN SERPL-MCNC: 16 MG/DL (ref 8–23)
BUN/CREAT SERPL: 19 (ref 7–25)
CALCIUM SERPL-MCNC: 9.2 MG/DL (ref 8.6–10.5)
CHLORIDE SERPL-SCNC: 104 MMOL/L (ref 98–107)
CHOLEST SERPL-MCNC: 133 MG/DL (ref 0–200)
CO2 SERPL-SCNC: 26.2 MMOL/L (ref 22–29)
CREAT SERPL-MCNC: 0.84 MG/DL (ref 0.76–1.27)
GLOBULIN SER CALC-MCNC: 2.3 GM/DL
GLUCOSE SERPL-MCNC: 85 MG/DL (ref 65–99)
HDLC SERPL-MCNC: 41 MG/DL (ref 40–60)
LDLC SERPL CALC-MCNC: 70 MG/DL (ref 0–100)
POTASSIUM SERPL-SCNC: 4.6 MMOL/L (ref 3.5–5.2)
PROT SERPL-MCNC: 7 G/DL (ref 6–8.5)
SODIUM SERPL-SCNC: 139 MMOL/L (ref 136–145)
TRIGL SERPL-MCNC: 119 MG/DL (ref 0–150)
VLDLC SERPL CALC-MCNC: 22 MG/DL (ref 5–40)

## 2021-09-13 ENCOUNTER — HOSPITAL ENCOUNTER (OUTPATIENT)
Dept: CT IMAGING | Facility: HOSPITAL | Age: 61
Discharge: HOME | End: 2021-09-13
Admitting: FAMILY MEDICINE

## 2021-09-13 DIAGNOSIS — R91.1 LUNG NODULE SEEN ON IMAGING STUDY: ICD-10-CM

## 2021-09-13 PROCEDURE — 71250 CT THORAX DX C-: CPT

## 2021-10-25 ENCOUNTER — FLU SHOT (OUTPATIENT)
Dept: FAMILY MEDICINE CLINIC | Facility: CLINIC | Age: 61
End: 2021-10-25

## 2021-10-25 DIAGNOSIS — Z23 NEED FOR INFLUENZA VACCINATION: Primary | ICD-10-CM

## 2021-10-25 PROCEDURE — 90471 IMMUNIZATION ADMIN: CPT | Performed by: FAMILY MEDICINE

## 2021-10-25 PROCEDURE — 90686 IIV4 VACC NO PRSV 0.5 ML IM: CPT | Performed by: FAMILY MEDICINE

## 2021-11-03 ENCOUNTER — OFFICE VISIT (OUTPATIENT)
Dept: FAMILY MEDICINE CLINIC | Facility: CLINIC | Age: 61
End: 2021-11-03

## 2021-11-03 VITALS
RESPIRATION RATE: 16 BRPM | SYSTOLIC BLOOD PRESSURE: 143 MMHG | BODY MASS INDEX: 29.69 KG/M2 | DIASTOLIC BLOOD PRESSURE: 82 MMHG | TEMPERATURE: 97.3 F | OXYGEN SATURATION: 93 % | HEIGHT: 73 IN | WEIGHT: 224 LBS | HEART RATE: 79 BPM

## 2021-11-03 DIAGNOSIS — R10.84 GENERALIZED ABDOMINAL PAIN: Primary | ICD-10-CM

## 2021-11-03 PROCEDURE — 99213 OFFICE O/P EST LOW 20 MIN: CPT | Performed by: FAMILY MEDICINE

## 2021-11-03 RX ORDER — ONDANSETRON 4 MG/1
4 TABLET, FILM COATED ORAL EVERY 8 HOURS PRN
Qty: 10 TABLET | Refills: 1 | Status: SHIPPED | OUTPATIENT
Start: 2021-11-03 | End: 2022-07-27

## 2021-11-03 NOTE — PROGRESS NOTES
Subjective   Gregorio Callahan is a 61 y.o. male.     61-year-old male with history of ischemic heart disease hypertension and 4-day history of diarrhea      The following portions of the patient's history were reviewed and updated as appropriate: allergies, current medications, past family history, past medical history, past social history, past surgical history and problem list.    Review of Systems   Respiratory: Positive for shortness of breath.         Patient is noticed shortness of breath has gotten worse over the last year   Cardiovascular: Negative for chest pain and leg swelling.   Gastrointestinal: Positive for diarrhea. Negative for abdominal distention, abdominal pain and blood in stool.       Objective   Physical Exam  Vitals and nursing note reviewed.   Constitutional:       Appearance: Normal appearance.   Cardiovascular:      Rate and Rhythm: Normal rate and regular rhythm.      Pulses: Normal pulses.      Heart sounds: Normal heart sounds.   Pulmonary:      Effort: Pulmonary effort is normal.      Breath sounds: Normal breath sounds.   Abdominal:      General: Abdomen is flat. There is no distension.      Palpations: Abdomen is soft.      Tenderness: There is no abdominal tenderness.   Musculoskeletal:      Right lower leg: No edema.      Left lower leg: No edema.   Skin:     General: Skin is warm and dry.      Capillary Refill: Capillary refill takes less than 2 seconds.   Neurological:      General: No focal deficit present.      Mental Status: He is alert and oriented to person, place, and time.   Psychiatric:         Mood and Affect: Mood normal.         Behavior: Behavior normal.         Thought Content: Thought content normal.         Judgment: Judgment normal.         Assessment/Plan   Diagnoses and all orders for this visit:    1. Generalized abdominal pain (Primary)  -     Comprehensive Metabolic Panel  -     CBC & Differential  -     Amylase  -     Lipase    Other orders  -     ondansetron  (ZOFRAN) 4 MG tablet; Take 1 tablet by mouth Every 8 (Eight) Hours As Needed for Nausea.  Dispense: 10 tablet; Refill: 1         9 above plus liquids and soups Imodium as needed-move appointment with cardiologist-patient examined with full PPE

## 2021-11-04 LAB
ALBUMIN SERPL-MCNC: 5 G/DL (ref 3.5–5.2)
ALBUMIN/GLOB SERPL: 2.3 G/DL
ALP SERPL-CCNC: 111 U/L (ref 39–117)
ALT SERPL-CCNC: 23 U/L (ref 1–41)
AMYLASE SERPL-CCNC: 63 U/L (ref 28–100)
AST SERPL-CCNC: 34 U/L (ref 1–40)
BASOPHILS # BLD AUTO: 0.01 10*3/MM3 (ref 0–0.2)
BASOPHILS NFR BLD AUTO: 0.2 % (ref 0–1.5)
BILIRUB SERPL-MCNC: 0.9 MG/DL (ref 0–1.2)
BUN SERPL-MCNC: 13 MG/DL (ref 8–23)
BUN/CREAT SERPL: 11.4 (ref 7–25)
CALCIUM SERPL-MCNC: 8.8 MG/DL (ref 8.6–10.5)
CHLORIDE SERPL-SCNC: 103 MMOL/L (ref 98–107)
CO2 SERPL-SCNC: 23.8 MMOL/L (ref 22–29)
CREAT SERPL-MCNC: 1.14 MG/DL (ref 0.76–1.27)
EOSINOPHIL # BLD AUTO: 0.06 10*3/MM3 (ref 0–0.4)
EOSINOPHIL NFR BLD AUTO: 1.2 % (ref 0.3–6.2)
ERYTHROCYTE [DISTWIDTH] IN BLOOD BY AUTOMATED COUNT: 13.1 % (ref 12.3–15.4)
GLOBULIN SER CALC-MCNC: 2.2 GM/DL
GLUCOSE SERPL-MCNC: 74 MG/DL (ref 65–99)
HCT VFR BLD AUTO: 43.7 % (ref 37.5–51)
HGB BLD-MCNC: 14.7 G/DL (ref 13–17.7)
IMM GRANULOCYTES # BLD AUTO: 0.02 10*3/MM3 (ref 0–0.05)
IMM GRANULOCYTES NFR BLD AUTO: 0.4 % (ref 0–0.5)
LIPASE SERPL-CCNC: 24 U/L (ref 13–60)
LYMPHOCYTES # BLD AUTO: 1.14 10*3/MM3 (ref 0.7–3.1)
LYMPHOCYTES NFR BLD AUTO: 21.9 % (ref 19.6–45.3)
MCH RBC QN AUTO: 30.5 PG (ref 26.6–33)
MCHC RBC AUTO-ENTMCNC: 33.6 G/DL (ref 31.5–35.7)
MCV RBC AUTO: 90.7 FL (ref 79–97)
MONOCYTES # BLD AUTO: 1.11 10*3/MM3 (ref 0.1–0.9)
MONOCYTES NFR BLD AUTO: 21.3 % (ref 5–12)
NEUTROPHILS # BLD AUTO: 2.86 10*3/MM3 (ref 1.7–7)
NEUTROPHILS NFR BLD AUTO: 55 % (ref 42.7–76)
NRBC BLD AUTO-RTO: 0 /100 WBC (ref 0–0.2)
PLATELET # BLD AUTO: 127 10*3/MM3 (ref 140–450)
POTASSIUM SERPL-SCNC: 4.5 MMOL/L (ref 3.5–5.2)
PROT SERPL-MCNC: 7.2 G/DL (ref 6–8.5)
RBC # BLD AUTO: 4.82 10*6/MM3 (ref 4.14–5.8)
SODIUM SERPL-SCNC: 137 MMOL/L (ref 136–145)
WBC # BLD AUTO: 5.2 10*3/MM3 (ref 3.4–10.8)

## 2021-11-12 ENCOUNTER — OFFICE VISIT (OUTPATIENT)
Dept: CARDIOLOGY | Facility: CLINIC | Age: 61
End: 2021-11-12

## 2021-11-12 VITALS
HEIGHT: 73 IN | SYSTOLIC BLOOD PRESSURE: 119 MMHG | HEART RATE: 68 BPM | DIASTOLIC BLOOD PRESSURE: 63 MMHG | WEIGHT: 223 LBS | BODY MASS INDEX: 29.55 KG/M2

## 2021-11-12 DIAGNOSIS — I10 BENIGN ESSENTIAL HYPERTENSION: ICD-10-CM

## 2021-11-12 DIAGNOSIS — E78.5 HYPERLIPIDEMIA, MILD: ICD-10-CM

## 2021-11-12 DIAGNOSIS — R06.09 DOE (DYSPNEA ON EXERTION): Primary | ICD-10-CM

## 2021-11-12 DIAGNOSIS — I25.10 CAD IN NATIVE ARTERY: ICD-10-CM

## 2021-11-12 PROCEDURE — 93000 ELECTROCARDIOGRAM COMPLETE: CPT | Performed by: INTERNAL MEDICINE

## 2021-11-12 PROCEDURE — 99214 OFFICE O/P EST MOD 30 MIN: CPT | Performed by: INTERNAL MEDICINE

## 2021-11-12 NOTE — PROGRESS NOTES
Subjective    Gregorio Callahan is a 61 y.o. male. Here for eval of worsening szymanski    History of Present Illness     SZYMANSKI:  He has noted this starting about a year ago and after COVID infection. His stamina is gradually declining 2/2 szymanski but he still does strenuous work with [employer] and can keep up ok. Has no edema and no cp like before his cor stent. Is adherent to meds that are stable. EKG today is nsc. Has no wheezing but has been a heavy smoker in the past and also had environmental dust and chemical exposure.     IHD:  No recurrence of his anginal type cp. Stable meds.    HLD:  Is on a potent statin and has his LDL<70    HTN:  Clinic checks are mostly to goal on stable med.        The following portions of the patient's history were reviewed and updated as appropriate: allergies, current medications, past family history, past medical history, past social history, past surgical history and problem list.    Patient Active Problem List   Diagnosis   • Hyperlipidemia, mild   • Benign essential hypertension   • CAD in native artery   • H/O acute myocardial infarction   • History of PTCA   • SZYMANSKI (dyspnea on exertion)       No Known Allergies    Family History   Problem Relation Age of Onset   • Heart disease Mother    • Heart attack Sister    • Alzheimer's disease Father    • No Known Problems Maternal Grandmother    • No Known Problems Maternal Grandfather    • No Known Problems Paternal Grandmother    • No Known Problems Paternal Grandfather        Social History     Socioeconomic History   • Marital status:    Tobacco Use   • Smoking status: Former Smoker     Packs/day: 3.00     Years: 30.00     Pack years: 90.00     Types: Cigarettes     Quit date: 2007     Years since quittin.8   • Smokeless tobacco: Never Used   • Tobacco comment: age quit smokin   Substance and Sexual Activity   • Alcohol use: Yes     Comment: Occasional alcohol use. Drinks beer.   • Drug use: No   • Sexual activity: Defer          Current Outpatient Medications:   •  aspirin 81 MG EC tablet, Take 81 mg by mouth Daily., Disp: , Rfl:   •  atorvastatin (LIPITOR) 80 MG tablet, TAKE 1 TABLET BY MOUTH EVERY DAY, Disp: 90 tablet, Rfl: 3  •  captopril (CAPOTEN) 25 MG tablet, Take 1 tablet by mouth 2 (Two) Times a Day., Disp: 180 tablet, Rfl: 3  •  CHONDROITIN SULFATE A PO, Take  by mouth., Disp: , Rfl:   •  citalopram (CeleXA) 40 MG tablet, TAKE 1 TABLET BY MOUTH EVERY DAY, Disp: 90 tablet, Rfl: 2  •  ezetimibe (ZETIA) 10 MG tablet, TAKE 1 TABLET BY MOUTH EVERY DAY, Disp: 90 tablet, Rfl: 3  •  Ginkgo Biloba (GINKOBA) 40 MG tablet, Take 40 mg by mouth Daily., Disp: , Rfl:   •  Glucosamine HCl 1000 MG tablet, Take  by mouth., Disp: , Rfl:   •  ibuprofen (ADVIL) 200 MG tablet, Take 200 mg by mouth Every 6 (Six) Hours As Needed for Mild Pain ., Disp: , Rfl:   •  icosapent ethyl (VASCEPA) 1 g capsule capsule, Take 2 g by mouth 2 (Two) Times a Day With Meals., Disp: 360 capsule, Rfl: 3  •  nitroglycerin (NITROSTAT) 0.4 MG SL tablet, TAKE 1 TABLET BY MOUTH EVERY 5 MINUTES UP TO 3 TIMES AS NEEDED FOR CHEST PAIN., Disp: 25 tablet, Rfl: 2  •  Nutritional Supplements (OSTEO ADVANCE) tablet, Take  by mouth 2 (Two) Times a Day., Disp: , Rfl:   •  ondansetron (ZOFRAN) 4 MG tablet, Take 1 tablet by mouth Every 8 (Eight) Hours As Needed for Nausea., Disp: 10 tablet, Rfl: 1  •  Potassium 99 MG tablet, Take 99 mg by mouth Daily., Disp: , Rfl:   •  traZODone (DESYREL) 50 MG tablet, TAKE 1 OR 2 TABLETS BY MOUTH AT BEDTIME AS NEEDED, Disp: 180 tablet, Rfl: 3  •  VIAGRA 100 MG tablet, TAKE AS DIRECTED, Disp: 6 tablet, Rfl: 3    Past Surgical History:   Procedure Laterality Date   • CARDIAC CATHETERIZATION     • CORONARY STENT PLACEMENT      x1       Review of Systems   Constitutional: Negative for activity change, appetite change, fatigue and unexpected weight change.   Respiratory: Positive for shortness of breath. Negative for cough, chest tightness and  "wheezing.    Cardiovascular: Negative for chest pain, palpitations and leg swelling.   Gastrointestinal: Negative for abdominal pain.   Genitourinary: Negative for difficulty urinating.       /63   Pulse 68   Ht 185.4 cm (73\")   Wt 101 kg (223 lb)   BMI 29.42 kg/m²   Procedures    Objective   Physical Exam  Constitutional:       Appearance: Normal appearance.   Cardiovascular:      Rate and Rhythm: Normal rate and regular rhythm.      Pulses: Normal pulses.      Heart sounds: Normal heart sounds. No murmur heard.  No friction rub. No gallop.    Pulmonary:      Effort: Pulmonary effort is normal. No respiratory distress.      Breath sounds: Normal breath sounds. No wheezing or rales.   Abdominal:      General: Bowel sounds are normal.      Tenderness: There is no abdominal tenderness.   Musculoskeletal:      Right lower leg: No edema.      Left lower leg: No edema.   Skin:     General: Skin is warm and dry.   Neurological:      General: No focal deficit present.   Psychiatric:         Mood and Affect: Mood normal.         Assessment/Plan   Diagnoses and all orders for this visit:    1. SZYMANSKI (dyspnea on exertion) (Primary)  Comments:  new problem - check stress test and if ok see pcp about lung testing.  Orders:  -     ECG 12 Lead  -     Adult Stress Echo W/ Cont or Stress Agent if Necessary Per Protocol    2. CAD in native artery  Comments:  no agninal type cp    3. Benign essential hypertension  Comments:  controlled    4. Hyperlipidemia, mild  Comments:  controlled    mdm=mod - new symptom that is worsening and requires op testing and fu of previous conditions             Return in about 1 year (around 11/12/2022) for Next scheduled follow up.  Orders Placed This Encounter   Procedures   • ECG 12 Lead     Order Specific Question:   Reason for Exam:     Answer:   [email or web address].[email or web address]     Order Specific Question:   Release to patient     Answer:   Immediate   • Adult Stress Echo W/ Cont or Stress Agent if Necessary " Per Protocol     Order Specific Question:   What stress agent will be used?     Answer:   Exercise with [employer]     Order Specific Question:   Reason for exam?     Answer:   Dyspnea

## 2021-11-19 ENCOUNTER — HOSPITAL ENCOUNTER (OUTPATIENT)
Dept: CARDIOLOGY | Facility: HOSPITAL | Age: 61
Discharge: HOME | End: 2021-11-19
Admitting: INTERNAL MEDICINE

## 2021-11-19 VITALS
WEIGHT: 222.66 LBS | DIASTOLIC BLOOD PRESSURE: 72 MMHG | HEART RATE: 63 BPM | HEIGHT: 73 IN | BODY MASS INDEX: 29.51 KG/M2 | SYSTOLIC BLOOD PRESSURE: 130 MMHG

## 2021-11-19 PROCEDURE — 93018 CV STRESS TEST I&R ONLY: CPT | Performed by: INTERNAL MEDICINE

## 2021-11-19 PROCEDURE — 93350 STRESS TTE ONLY: CPT

## 2021-11-19 PROCEDURE — 25010000002 PERFLUTREN 6.52 MG/ML SUSPENSION: Performed by: INTERNAL MEDICINE

## 2021-11-19 PROCEDURE — 93352 ADMIN ECG CONTRAST AGENT: CPT | Performed by: INTERNAL MEDICINE

## 2021-11-19 PROCEDURE — 93017 CV STRESS TEST TRACING ONLY: CPT

## 2021-11-19 PROCEDURE — 93350 STRESS TTE ONLY: CPT | Performed by: INTERNAL MEDICINE

## 2021-11-19 RX ADMIN — PERFLUTREN 8.48 MG: 6.52 INJECTION, SUSPENSION INTRAVENOUS at 14:33

## 2021-11-21 LAB
BH CV STRESS BP STAGE 1: NORMAL
BH CV STRESS BP STAGE 2: NORMAL
BH CV STRESS DURATION MIN STAGE 1: 3
BH CV STRESS DURATION MIN STAGE 2: 2
BH CV STRESS DURATION SEC STAGE 1: 0
BH CV STRESS DURATION SEC STAGE 2: 44
BH CV STRESS GRADE STAGE 1: 10
BH CV STRESS GRADE STAGE 2: 12
BH CV STRESS HR STAGE 1: 126
BH CV STRESS HR STAGE 2: 144
BH CV STRESS METS STAGE 1: 5
BH CV STRESS METS STAGE 2: 7.5
BH CV STRESS PROTOCOL 1: NORMAL
BH CV STRESS RECOVERY BP: NORMAL MMHG
BH CV STRESS RECOVERY HR: 86 BPM
BH CV STRESS SPEED STAGE 1: 1.7
BH CV STRESS SPEED STAGE 2: 2.5
BH CV STRESS STAGE 1: 1
BH CV STRESS STAGE 2: 2
MAXIMAL PREDICTED HEART RATE: 160 BPM
PERCENT MAX PREDICTED HR: 90 %
STRESS BASELINE BP: NORMAL MMHG
STRESS BASELINE HR: 63 BPM
STRESS PERCENT HR: 106 %
STRESS POST ESTIMATED WORKLOAD: 7.5 METS
STRESS POST EXERCISE DUR MIN: 5 MIN
STRESS POST EXERCISE DUR SEC: 44 SEC
STRESS POST PEAK BP: NORMAL MMHG
STRESS POST PEAK HR: 144 BPM
STRESS TARGET HR: 136 BPM

## 2021-11-29 RX ORDER — CITALOPRAM 40 MG/1
TABLET ORAL
Qty: 90 TABLET | Refills: 0 | Status: SHIPPED | OUTPATIENT
Start: 2021-11-29 | End: 2022-02-24

## 2021-11-29 NOTE — TELEPHONE ENCOUNTER
Rx Refill Note  Requested Prescriptions     Pending Prescriptions Disp Refills   • citalopram (CeleXA) 40 MG tablet [Pharmacy Med Name: CITALOPRAM HBR 40 MG TABLET] 90 tablet 2     Sig: TAKE 1 TABLET BY MOUTH EVERY DAY      Last office visit with prescribing clinician: 11/3/2021      Next office visit with prescribing clinician: 1/19/2022            Kat Mathew MA  11/29/21, 13:26 CST

## 2021-12-13 RX ORDER — CAPTOPRIL 25 MG/1
TABLET ORAL
Qty: 180 TABLET | Refills: 3 | Status: SHIPPED | OUTPATIENT
Start: 2021-12-13 | End: 2022-12-06 | Stop reason: SDUPTHER

## 2021-12-13 NOTE — TELEPHONE ENCOUNTER
Rx Refill Note  Requested Prescriptions     Pending Prescriptions Disp Refills   • captopril (CAPOTEN) 25 MG tablet [Pharmacy Med Name: CAPTOPRIL 25 MG TABLET] 180 tablet 3     Sig: TAKE 1 TABLET BY MOUTH TWICE A DAY      Last office visit with prescribing clinician: 11/3/2021      Next office visit with prescribing clinician: 1/19/2022            Kat Mathew MA  12/13/21, 07:31 CST

## 2021-12-21 DIAGNOSIS — E78.2 MIXED HYPERLIPIDEMIA: ICD-10-CM

## 2021-12-21 RX ORDER — EZETIMIBE 10 MG/1
TABLET ORAL
Qty: 90 TABLET | Refills: 3 | Status: SHIPPED | OUTPATIENT
Start: 2021-12-21 | End: 2022-12-06

## 2021-12-23 ENCOUNTER — OFFICE VISIT (OUTPATIENT)
Dept: FAMILY MEDICINE CLINIC | Facility: CLINIC | Age: 61
End: 2021-12-23

## 2021-12-23 VITALS
WEIGHT: 220.6 LBS | DIASTOLIC BLOOD PRESSURE: 82 MMHG | SYSTOLIC BLOOD PRESSURE: 128 MMHG | TEMPERATURE: 98.6 F | HEART RATE: 72 BPM | BODY MASS INDEX: 29.24 KG/M2 | OXYGEN SATURATION: 98 % | HEIGHT: 73 IN

## 2021-12-23 DIAGNOSIS — M54.41 ACUTE RIGHT-SIDED LOW BACK PAIN WITH RIGHT-SIDED SCIATICA: Primary | ICD-10-CM

## 2021-12-23 PROCEDURE — 99213 OFFICE O/P EST LOW 20 MIN: CPT | Performed by: NURSE PRACTITIONER

## 2021-12-23 PROCEDURE — 96372 THER/PROPH/DIAG INJ SC/IM: CPT | Performed by: NURSE PRACTITIONER

## 2021-12-23 RX ORDER — TRIAMCINOLONE ACETONIDE 40 MG/ML
40 INJECTION, SUSPENSION INTRA-ARTICULAR; INTRAMUSCULAR ONCE
Status: COMPLETED | OUTPATIENT
Start: 2021-12-23 | End: 2021-12-23

## 2021-12-23 RX ORDER — METHYLPREDNISOLONE ACETATE 40 MG/ML
40 INJECTION, SUSPENSION INTRA-ARTICULAR; INTRALESIONAL; INTRAMUSCULAR; SOFT TISSUE ONCE
Status: DISCONTINUED | OUTPATIENT
Start: 2021-12-23 | End: 2021-12-23

## 2021-12-23 RX ORDER — PREDNISONE 10 MG/1
TABLET ORAL
Qty: 21 TABLET | Refills: 0 | Status: SHIPPED | OUTPATIENT
Start: 2021-12-23 | End: 2022-01-19

## 2021-12-23 RX ADMIN — TRIAMCINOLONE ACETONIDE 40 MG: 40 INJECTION, SUSPENSION INTRA-ARTICULAR; INTRAMUSCULAR at 14:39

## 2021-12-23 NOTE — PROGRESS NOTES
CC: back pain    History:  Gregorio Callahan is a 61 y.o. male who presents today for evaluation of the above problems.     Back pain in area of previous ruptured disc. Much worse with driving. Pain is in back right lumbar.   Started bothering him around 3 weeks ago.     HPI  ROS:  Review of Systems   Musculoskeletal: Positive for back pain.   Neurological: Negative for weakness.       No Known Allergies  Past Medical History:   Diagnosis Date   • Benign essential hypertension    • CAD in native artery    • H/O acute myocardial infarction    • History of PTCA    • Hyperlipidemia, mild    • Tobacco use disorder      Past Surgical History:   Procedure Laterality Date   • CARDIAC CATHETERIZATION     • CORONARY STENT PLACEMENT      x1     Family History   Problem Relation Age of Onset   • Heart disease Mother    • Heart attack Sister    • Alzheimer's disease Father    • No Known Problems Maternal Grandmother    • No Known Problems Maternal Grandfather    • No Known Problems Paternal Grandmother    • No Known Problems Paternal Grandfather       reports that he quit smoking about 14 years ago. His smoking use included cigarettes. He has a 90.00 pack-year smoking history. He has never used smokeless tobacco. He reports current alcohol use. He reports that he does not use drugs.      Current Outpatient Medications:   •  aspirin 81 MG EC tablet, Take 81 mg by mouth Daily., Disp: , Rfl:   •  atorvastatin (LIPITOR) 80 MG tablet, TAKE 1 TABLET BY MOUTH EVERY DAY, Disp: 90 tablet, Rfl: 3  •  captopril (CAPOTEN) 25 MG tablet, TAKE 1 TABLET BY MOUTH TWICE A DAY, Disp: 180 tablet, Rfl: 3  •  CHONDROITIN SULFATE A PO, Take  by mouth., Disp: , Rfl:   •  citalopram (CeleXA) 40 MG tablet, TAKE 1 TABLET BY MOUTH EVERY DAY, Disp: 90 tablet, Rfl: 0  •  ezetimibe (ZETIA) 10 MG tablet, TAKE 1 TABLET BY MOUTH EVERY DAY, Disp: 90 tablet, Rfl: 3  •  Ginkgo Biloba (GINKOBA) 40 MG tablet, Take 40 mg by mouth Daily., Disp: , Rfl:   •  Glucosamine HCl  "1000 MG tablet, Take  by mouth., Disp: , Rfl:   •  ibuprofen (ADVIL) 200 MG tablet, Take 200 mg by mouth Every 6 (Six) Hours As Needed for Mild Pain ., Disp: , Rfl:   •  icosapent ethyl (VASCEPA) 1 g capsule capsule, Take 2 g by mouth 2 (Two) Times a Day With Meals., Disp: 360 capsule, Rfl: 3  •  nitroglycerin (NITROSTAT) 0.4 MG SL tablet, TAKE 1 TABLET BY MOUTH EVERY 5 MINUTES UP TO 3 TIMES AS NEEDED FOR CHEST PAIN., Disp: 25 tablet, Rfl: 2  •  Nutritional Supplements (OSTEO ADVANCE) tablet, Take  by mouth 2 (Two) Times a Day., Disp: , Rfl:   •  ondansetron (ZOFRAN) 4 MG tablet, Take 1 tablet by mouth Every 8 (Eight) Hours As Needed for Nausea., Disp: 10 tablet, Rfl: 1  •  Potassium 99 MG tablet, Take 99 mg by mouth Daily., Disp: , Rfl:   •  traZODone (DESYREL) 50 MG tablet, TAKE 1 OR 2 TABLETS BY MOUTH AT BEDTIME AS NEEDED, Disp: 180 tablet, Rfl: 3  •  VIAGRA 100 MG tablet, TAKE AS DIRECTED, Disp: 6 tablet, Rfl: 3  •  predniSONE (DELTASONE) 10 MG (21) dose pack, Use as directed on package, Disp: 21 tablet, Rfl: 0  No current facility-administered medications for this visit.    OBJECTIVE:  /82 (BP Location: Left arm, Patient Position: Sitting, Cuff Size: Large Adult)   Pulse 72   Temp 98.6 °F (37 °C) (Temporal)   Ht 185.4 cm (73\")   Wt 100 kg (220 lb 9.6 oz)   SpO2 98%   BMI 29.10 kg/m²    Physical Exam  Vitals reviewed.   Constitutional:       Appearance: He is well-developed.   Cardiovascular:      Rate and Rhythm: Normal rate.   Pulmonary:      Effort: Pulmonary effort is normal.   Musculoskeletal:      Comments: Tenderness over right sacroiliac area   Neurological:      Mental Status: He is alert and oriented to person, place, and time.      Comments: Hip flexor and knee strength symmetrical.    Psychiatric:         Behavior: Behavior normal.         Assessment/Plan    Diagnoses and all orders for this visit:    1. Acute right-sided low back pain with right-sided sciatica (Primary)  -     " Discontinue: methylPREDNISolone acetate (DEPO-medrol) injection 40 mg  -     predniSONE (DELTASONE) 10 MG (21) dose pack; Use as directed on package  Dispense: 21 tablet; Refill: 0  -     triamcinolone acetonide (KENALOG-40) injection 40 mg        An After Visit Summary was printed and given to the patient at discharge.  Return if symptoms worsen or fail to improve, for Next scheduled follow up.       CHRISTIANA Jessica 12/23/21    Electronically signed.

## 2022-01-19 ENCOUNTER — OFFICE VISIT (OUTPATIENT)
Dept: FAMILY MEDICINE CLINIC | Facility: CLINIC | Age: 62
End: 2022-01-19

## 2022-01-19 VITALS
HEIGHT: 73 IN | WEIGHT: 222.4 LBS | HEART RATE: 65 BPM | DIASTOLIC BLOOD PRESSURE: 72 MMHG | BODY MASS INDEX: 29.48 KG/M2 | OXYGEN SATURATION: 98 % | SYSTOLIC BLOOD PRESSURE: 110 MMHG | TEMPERATURE: 97.5 F

## 2022-01-19 DIAGNOSIS — Z12.12 SCREENING FOR COLORECTAL CANCER: ICD-10-CM

## 2022-01-19 DIAGNOSIS — I10 HYPERTENSION, UNSPECIFIED TYPE: ICD-10-CM

## 2022-01-19 DIAGNOSIS — Z12.5 SPECIAL SCREENING FOR MALIGNANT NEOPLASM OF PROSTATE: ICD-10-CM

## 2022-01-19 DIAGNOSIS — Z00.00 ROUTINE GENERAL MEDICAL EXAMINATION AT A HEALTH CARE FACILITY: Primary | ICD-10-CM

## 2022-01-19 DIAGNOSIS — Z12.11 SCREENING FOR COLORECTAL CANCER: ICD-10-CM

## 2022-01-19 PROCEDURE — 99396 PREV VISIT EST AGE 40-64: CPT | Performed by: FAMILY MEDICINE

## 2022-01-19 PROCEDURE — 81003 URINALYSIS AUTO W/O SCOPE: CPT | Performed by: FAMILY MEDICINE

## 2022-01-19 NOTE — PROGRESS NOTES
Subjective   Gregorio Callahan is a 61 y.o. male.     61-year-old male with known ischemic heart disease for annual physical       The following portions of the patient's history were reviewed and updated as appropriate: allergies, current medications, past family history, past medical history, past social history, past surgical history and problem list.    Review of Systems   Respiratory: Negative for shortness of breath.    Cardiovascular: Negative for chest pain and leg swelling.        Sees cardiologist yearly   Gastrointestinal:        10 years since last colonoscopy-referred   Genitourinary: Negative for difficulty urinating.   Musculoskeletal: Positive for arthralgias.   All other systems reviewed and are negative.      Objective   Physical Exam  Vitals and nursing note reviewed.   Constitutional:       Appearance: Normal appearance.   HENT:      Right Ear: Tympanic membrane and ear canal normal.      Left Ear: Tympanic membrane and ear canal normal.   Eyes:      Extraocular Movements: Extraocular movements intact.      Pupils: Pupils are equal, round, and reactive to light.   Neck:      Vascular: No carotid bruit.   Cardiovascular:      Rate and Rhythm: Normal rate and regular rhythm.      Pulses: Normal pulses.      Heart sounds: Normal heart sounds.   Pulmonary:      Effort: Pulmonary effort is normal.      Breath sounds: Normal breath sounds.   Abdominal:      General: Abdomen is flat.      Palpations: Abdomen is soft.   Genitourinary:     Penis: Normal.       Testes: Normal.      Prostate: Normal.      Comments: No testicular masses inguinal hernia or adenopathy-prostate 2+ no nodules raphae maintained  Musculoskeletal:      Right lower leg: No edema.      Left lower leg: No edema.   Lymphadenopathy:      Cervical: No cervical adenopathy.   Skin:     General: Skin is warm and dry.      Capillary Refill: Capillary refill takes less than 2 seconds.   Neurological:      General: No focal deficit present.       Mental Status: He is alert and oriented to person, place, and time.   Psychiatric:         Mood and Affect: Mood normal.         Behavior: Behavior normal.         Thought Content: Thought content normal.         Judgment: Judgment normal.         Assessment/Plan   Problems Addressed this Visit     None      Visit Diagnoses     Routine general medical examination at a health care facility    -  Primary    Relevant Orders    TSH    T4, free    CBC & Differential    Comprehensive Metabolic Panel    Lipid Panel    Hypertension, unspecified type        Relevant Orders    POC Urinalysis Dipstick, Multipro (Completed)    Special screening for malignant neoplasm of prostate        Relevant Orders    PSA Screen    Screening for colorectal cancer        Relevant Orders    Ambulatory Referral to Gastroenterology      Diagnoses       Codes Comments    Routine general medical examination at a health care facility    -  Primary ICD-10-CM: Z00.00  ICD-9-CM: V70.0     Hypertension, unspecified type     ICD-10-CM: I10  ICD-9-CM: 401.9     Special screening for malignant neoplasm of prostate     ICD-10-CM: Z12.5  ICD-9-CM: V76.44     Screening for colorectal cancer     ICD-10-CM: Z12.11, Z12.12  ICD-9-CM: V76.51, V76.41           Plan above-6 months needs follow-up CT of chest

## 2022-01-19 NOTE — PATIENT INSTRUCTIONS
Mediterranean Diet  A Mediterranean diet refers to food and lifestyle choices that are based on the traditions of countries located on the [place]. This way of eating has been shown to help prevent certain conditions and improve outcomes for people who have chronic diseases, like kidney disease and heart disease.  What are tips for following this plan?  Lifestyle  · Cook and eat meals together with your family, when possible.  · Drink enough fluid to keep your urine clear or pale yellow.  · Be physically active every day. This includes:  ? Aerobic exercise like running or swimming.  ? Leisure activities like gardening, walking, or housework.  · Get 7-8 hours of sleep each night.  · If recommended by your health care provider, drink red wine in moderation. This means 1 glass a day for nonpregnant women and 2 glasses a day for men. A glass of wine equals 5 oz (150 mL).  Reading food labels    · Check the serving size of packaged foods. For foods such as rice and pasta, the serving size refers to the amount of cooked product, not dry.  · Check the total fat in packaged foods. Avoid foods that have saturated fat or trans fats.  · Check the ingredients list for added sugars, such as corn syrup.    Shopping  · At the grocery store, buy most of your food from the areas near the walls of the store. This includes:  ? Fresh fruits and vegetables (produce).  ? Grains, beans, nuts, and seeds. Some of these may be available in unpackaged forms or large amounts (in bulk).  ? Fresh seafood.  ? Poultry and eggs.  ? Low-fat dairy products.  · Buy whole ingredients instead of prepackaged foods.  · Buy fresh fruits and vegetables in-season from local farmers markets.  · Buy frozen fruits and vegetables in resealable bags.  · If you do not have access to quality fresh seafood, buy precooked frozen shrimp or canned fish, such as tuna, salmon, or sardines.  · Buy small amounts of raw or cooked vegetables, salads, or olives from  the deli or salad bar at your store.  · Stock your pantry so you always have certain foods on hand, such as olive oil, canned tuna, canned tomatoes, rice, pasta, and beans.  Cooking  · Cook foods with extra-virgin olive oil instead of using butter or other vegetable oils.  · Have meat as a side dish, and have vegetables or grains as your main dish. This means having meat in small portions or adding small amounts of meat to foods like pasta or stew.  · Use beans or vegetables instead of meat in common dishes like chili or lasagna.  · [place] with different cooking methods. Try roasting or broiling vegetables instead of steaming or sautéeing them.  · Add frozen vegetables to soups, stews, pasta, or rice.  · Add nuts or seeds for added healthy fat at each meal. You can add these to yogurt, salads, or vegetable dishes.  · Marinate fish or vegetables using olive oil, lemon juice, garlic, and fresh herbs.  Meal planning    · Plan to eat 1 vegetarian meal one day each week. Try to work up to 2 vegetarian meals, if possible.  · Eat seafood 2 or more times a week.  · Have healthy snacks readily available, such as:  ? Vegetable sticks with hummus.  ? Greek yogurt.  ? Fruit and nut trail mix.  · Eat balanced meals throughout the week. This includes:  ? Fruit: 2-3 servings a day  ? Vegetables: 4-5 servings a day  ? Low-fat dairy: 2 servings a day  ? Fish, poultry, or lean meat: 1 serving a day  ? Beans and legumes: 2 or more servings a week  ? Nuts and seeds: 1-2 servings a day  ? Whole grains: 6-8 servings a day  ? Extra-virgin olive oil: 3-4 servings a day  · Limit red meat and sweets to only a few servings a month    What are my food choices?  · Mediterranean diet  ? Recommended  § Grains: Whole-grain pasta. Brown rice. Bulgar wheat. Polenta. Couscous. Whole-wheat bread. Oatmeal. Quinoa.  § Vegetables: Artichokes. Beets. Broccoli. Cabbage. Carrots. Eggplant. Green beans. Chard. Kale. Spinach. Onions. Leeks. Peas. Squash.  Tomatoes. Peppers. Radishes.  § Fruits: Apples. Apricots. Avocado. Berries. Bananas. Cherries. Dates. Figs. Grapes. Milli. Melon. Oranges. Peaches. Plums. Pomegranate.  § Meats and other protein foods: Beans. Almonds. Sunflower seeds. Pine nuts. Peanuts. Cod. [place]. Scallops. Shrimp. Tuna. Tilapia. Clams. Oysters. Eggs.  § Dairy: Low-fat milk. Cheese. Greek yogurt.  § Beverages: Water. Red wine. Herbal tea.  § Fats and oils: Extra virgin olive oil. Avocado oil. Grape seed oil.  § Sweets and desserts: Greek yogurt with honey. Baked apples. Poached pears. Trail mix.  § Seasoning and other foods: Basil. Cilantro. Coriander. Cumin. Mint. Parsley. Marcin. Rosemary. Tarragon. Garlic. Oregano. Thyme. Pepper. Balsalmic vinegar. Tahini. Hummus. Tomato sauce. Olives. Mushrooms.  ? Limit these  § Grains: Prepackaged pasta or rice dishes. Prepackaged cereal with added sugar.  § Vegetables: Deep fried potatoes (french fries).  § Fruits: Fruit canned in syrup.  § Meats and other protein foods: Beef. Pork. Lamb. Poultry with skin. Hot dogs. [place].  § Dairy: Ice cream. Sour cream. Whole milk.  § Beverages: Juice. Sugar-sweetened soft drinks. Beer. Liquor and spirits.  § Fats and oils: Butter. Canola oil. Vegetable oil. Beef fat (tallow). Lard.  § Sweets and desserts: Cookies. Cakes. Pies. Candy.  § Seasoning and other foods: Mayonnaise. Premade sauces and marinades.  The items listed may not be a complete list. Talk with your dietitian about what dietary choices are right for you.  Summary  · The Mediterranean diet includes both food and lifestyle choices.  · Eat a variety of fresh fruits and vegetables, beans, nuts, seeds, and whole grains.  · Limit the amount of red meat and sweets that you eat.  · Talk with your health care provider about whether it is safe for you to drink red wine in moderation. This means 1 glass a day for nonpregnant women and 2 glasses a day for men. A glass of wine equals 5 oz (150 mL).  This information  is not intended to replace advice given to you by your health care provider. Make sure you discuss any questions you have with your health care provider.  Document Revised: 08/17/2017 Document Reviewed: 08/10/2017  Elsevier Patient Education © 2020 [employer] Inc[employer]

## 2022-01-20 LAB
ALBUMIN SERPL-MCNC: 4.6 G/DL (ref 3.5–5.2)
ALBUMIN/GLOB SERPL: 2 G/DL
ALP SERPL-CCNC: 107 U/L (ref 39–117)
ALT SERPL-CCNC: 27 U/L (ref 1–41)
AST SERPL-CCNC: 29 U/L (ref 1–40)
BASOPHILS # BLD AUTO: 0.01 10*3/MM3 (ref 0–0.2)
BASOPHILS NFR BLD AUTO: 0.3 % (ref 0–1.5)
BILIRUB SERPL-MCNC: 0.6 MG/DL (ref 0–1.2)
BUN SERPL-MCNC: 14 MG/DL (ref 8–23)
BUN/CREAT SERPL: 13.7 (ref 7–25)
CALCIUM SERPL-MCNC: 9.3 MG/DL (ref 8.6–10.5)
CHLORIDE SERPL-SCNC: 103 MMOL/L (ref 98–107)
CHOLEST SERPL-MCNC: 114 MG/DL (ref 0–200)
CO2 SERPL-SCNC: 29.5 MMOL/L (ref 22–29)
CREAT SERPL-MCNC: 1.02 MG/DL (ref 0.76–1.27)
EOSINOPHIL # BLD AUTO: 0.03 10*3/MM3 (ref 0–0.4)
EOSINOPHIL NFR BLD AUTO: 0.9 % (ref 0.3–6.2)
ERYTHROCYTE [DISTWIDTH] IN BLOOD BY AUTOMATED COUNT: 13.2 % (ref 12.3–15.4)
GLOBULIN SER CALC-MCNC: 2.3 GM/DL
GLUCOSE SERPL-MCNC: 90 MG/DL (ref 65–99)
HCT VFR BLD AUTO: 44 % (ref 37.5–51)
HDLC SERPL-MCNC: 35 MG/DL (ref 40–60)
HGB BLD-MCNC: 14.6 G/DL (ref 13–17.7)
IMM GRANULOCYTES # BLD AUTO: 0.01 10*3/MM3 (ref 0–0.05)
IMM GRANULOCYTES NFR BLD AUTO: 0.3 % (ref 0–0.5)
LDLC SERPL CALC-MCNC: 60 MG/DL (ref 0–100)
LYMPHOCYTES # BLD AUTO: 0.9 10*3/MM3 (ref 0.7–3.1)
LYMPHOCYTES NFR BLD AUTO: 26.2 % (ref 19.6–45.3)
MCH RBC QN AUTO: 30.3 PG (ref 26.6–33)
MCHC RBC AUTO-ENTMCNC: 33.2 G/DL (ref 31.5–35.7)
MCV RBC AUTO: 91.3 FL (ref 79–97)
MONOCYTES # BLD AUTO: 0.61 10*3/MM3 (ref 0.1–0.9)
MONOCYTES NFR BLD AUTO: 17.8 % (ref 5–12)
NEUTROPHILS # BLD AUTO: 1.87 10*3/MM3 (ref 1.7–7)
NEUTROPHILS NFR BLD AUTO: 54.5 % (ref 42.7–76)
NRBC BLD AUTO-RTO: 0 /100 WBC (ref 0–0.2)
PLATELET # BLD AUTO: 113 10*3/MM3 (ref 140–450)
POTASSIUM SERPL-SCNC: 4.3 MMOL/L (ref 3.5–5.2)
PROT SERPL-MCNC: 6.9 G/DL (ref 6–8.5)
PSA SERPL-MCNC: 0.43 NG/ML (ref 0–4)
RBC # BLD AUTO: 4.82 10*6/MM3 (ref 4.14–5.8)
SODIUM SERPL-SCNC: 139 MMOL/L (ref 136–145)
T4 FREE SERPL-MCNC: 0.91 NG/DL (ref 0.93–1.7)
TRIGL SERPL-MCNC: 98 MG/DL (ref 0–150)
TSH SERPL DL<=0.005 MIU/L-ACNC: 2.31 UIU/ML (ref 0.27–4.2)
VLDLC SERPL CALC-MCNC: 19 MG/DL (ref 5–40)
WBC # BLD AUTO: 3.43 10*3/MM3 (ref 3.4–10.8)

## 2022-01-25 ENCOUNTER — OFFICE VISIT (OUTPATIENT)
Dept: GASTROENTEROLOGY | Facility: CLINIC | Age: 62
End: 2022-01-25

## 2022-01-25 VITALS
TEMPERATURE: 96.8 F | OXYGEN SATURATION: 98 % | DIASTOLIC BLOOD PRESSURE: 70 MMHG | HEIGHT: 73 IN | HEART RATE: 72 BPM | BODY MASS INDEX: 29.69 KG/M2 | WEIGHT: 224 LBS | SYSTOLIC BLOOD PRESSURE: 124 MMHG

## 2022-01-25 DIAGNOSIS — Z12.11 ENCOUNTER FOR SCREENING FOR MALIGNANT NEOPLASM OF COLON: Primary | ICD-10-CM

## 2022-01-25 PROCEDURE — S0285 CNSLT BEFORE SCREEN COLONOSC: HCPCS | Performed by: NURSE PRACTITIONER

## 2022-01-25 NOTE — PROGRESS NOTES
[facility]enter[facility]    Primary Physician Ajith Barrientos MD    1/25/2022    Gregorio Callahan   1960      Chief Complaint   Patient presents with   • Colonoscopy       Subjective     HPI    Gregorio Callahan is a 61 y.o. male who presents as a referral for preventative maintenance. He has no complaints of nausea or vomiting. No change in bowels. No wt loss. No BRBPR. No melena. No abdominal pain.        Last colonoscopy was around 11 years ago. The patient does not  have history of colon polyps/colon cancer.   There is not a family history of colon polyps/colon cancer.     Past Medical History:   Diagnosis Date   • Benign essential hypertension    • CAD in native artery    • H/O acute myocardial infarction    • History of PTCA    • Hyperlipidemia, mild    • Tobacco use disorder        Past Surgical History:   Procedure Laterality Date   • CARDIAC CATHETERIZATION     • CORONARY STENT PLACEMENT      x1       Outpatient Medications Marked as Taking for the 1/25/22 encounter (Office Visit) with Beth Ledesma APRN   Medication Sig Dispense Refill   • aspirin 81 MG EC tablet Take 81 mg by mouth Daily.     • atorvastatin (LIPITOR) 80 MG tablet TAKE 1 TABLET BY MOUTH EVERY DAY 90 tablet 3   • captopril (CAPOTEN) 25 MG tablet TAKE 1 TABLET BY MOUTH TWICE A  tablet 3   • citalopram (CeleXA) 40 MG tablet TAKE 1 TABLET BY MOUTH EVERY DAY 90 tablet 0   • ezetimibe (ZETIA) 10 MG tablet TAKE 1 TABLET BY MOUTH EVERY DAY 90 tablet 3   • Ginkgo Biloba (GINKOBA) 40 MG tablet Take 40 mg by mouth Daily.     • Misc Natural Products (OSTEO BI-FLEX TRIPLE STRENGTH PO) Take  by mouth 2 (Two) Times a Day.     • nitroglycerin (NITROSTAT) 0.4 MG SL tablet TAKE 1 TABLET BY MOUTH EVERY 5 MINUTES UP TO 3 TIMES AS NEEDED FOR CHEST PAIN. 25 tablet 2   • Potassium 99 MG tablet Take 99 mg by mouth Daily.     • traZODone (DESYREL) 50 MG tablet TAKE 1 OR 2 TABLETS BY MOUTH AT BEDTIME AS NEEDED 180 tablet 3       No Known  Allergies    Social History     Socioeconomic History   • Marital status:    Tobacco Use   • Smoking status: Former Smoker     Packs/day: 3.00     Years: 30.00     Pack years: 90.00     Types: Cigarettes     Quit date: 2007     Years since quitting: 15.0   • Smokeless tobacco: Never Used   • Tobacco comment: age quit smokin   Substance and Sexual Activity   • Alcohol use: Yes     Comment: Occasional alcohol use. Drinks beer.   • Drug use: No   • Sexual activity: Defer       Family History   Problem Relation Age of Onset   • Heart disease Mother    • Heart attack Sister    • Alzheimer's disease Father    • No Known Problems Maternal Grandmother    • No Known Problems Maternal Grandfather    • No Known Problems Paternal Grandmother    • No Known Problems Paternal Grandfather    • Colon cancer Neg Hx    • Colon polyps Neg Hx        Review of Systems   Constitutional: Negative for chills, fever and unexpected weight change.   Respiratory: Negative for cough, shortness of breath and wheezing.    Cardiovascular: Negative for chest pain and palpitations.   Gastrointestinal: Negative for abdominal distention, abdominal pain, anal bleeding, blood in stool, constipation, diarrhea, nausea and vomiting.       Objective     Vitals:    22 1223   BP: 124/70   Pulse: 72   Temp: 96.8 °F (36 °C)   SpO2: 98%         22  1223   Weight: 102 kg (224 lb)     Body mass index is 29.55 kg/m².    Physical Exam  Vitals reviewed.   Constitutional:       General: He is not in acute distress.     Appearance: He is well-developed.   HENT:      Head: Normocephalic and atraumatic.   Eyes:      General: No scleral icterus.  Neck:      Vascular: No JVD.   Cardiovascular:      Rate and Rhythm: Normal rate and regular rhythm.      Heart sounds: Normal heart sounds.   Pulmonary:      Effort: Pulmonary effort is normal.      Breath sounds: Normal breath sounds.   Abdominal:      General: Bowel sounds are normal. There is no  distension.      Palpations: Abdomen is soft.      Tenderness: There is no abdominal tenderness.   Musculoskeletal:         General: Normal range of motion.      Cervical back: Neck supple.      Right lower leg: No edema.      Left lower leg: No edema.   Skin:     General: Skin is warm and dry.   Neurological:      Mental Status: He is alert.      Comments: Oriented    Psychiatric:         Behavior: Behavior normal.         Imaging Results (Most Recent)     None          Assessment/Plan     Diagnoses and all orders for this visit:    1. Encounter for screening for malignant neoplasm of colon (Primary)  -     Case Request; Standing  -     Case Request    Other orders  -     Follow Anesthesia Guidelines / Protocol; Future  -     Obtain Informed Consent; Future          Plan for colonoscopy. Use miralax prep.  The patient was advised to take any blood pressure or heart  medications the morning of  procedure if that is when he/she normally takes.                 COLONOSCOPY WITH ANESTHESIA (N/A)  All risks, benefits, alternatives, and indications of colonoscopy procedure have been discussed with the patient. Risks to include perforation of the colon requiring possible surgery or colostomy, risk of bleeding from biopsies or removal of colon tissue, possibility of missing a colon polyp or cancer, or adverse drug reaction.  Benefits to include the diagnosis and management of disease of the colon and rectum. Alternatives to include barium enema, radiographic evaluation, lab testing or no intervention. Pt verbalizes understanding and agrees.       CHRISTIANA Osborne

## 2022-02-24 RX ORDER — CITALOPRAM 40 MG/1
TABLET ORAL
Qty: 90 TABLET | Refills: 3 | Status: SHIPPED | OUTPATIENT
Start: 2022-02-24 | End: 2023-02-17

## 2022-02-24 NOTE — TELEPHONE ENCOUNTER
Rx Refill Note  Requested Prescriptions     Pending Prescriptions Disp Refills   • citalopram (CeleXA) 40 MG tablet [Pharmacy Med Name: CITALOPRAM HBR 40 MG TABLET] 90 tablet 0     Sig: TAKE 1 TABLET BY MOUTH EVERY DAY      Last office visit with prescribing clinician: 12/23/2021      Next office visit with prescribing clinician: Visit date not found            Kat Mathew MA  02/24/22, 09:40 CST

## 2022-03-21 ENCOUNTER — TELEPHONE (OUTPATIENT)
Dept: GASTROENTEROLOGY | Facility: CLINIC | Age: 62
End: 2022-03-21

## 2022-03-21 ENCOUNTER — ANESTHESIA (OUTPATIENT)
Dept: GASTROENTEROLOGY | Facility: HOSPITAL | Age: 62
End: 2022-03-21

## 2022-03-21 ENCOUNTER — HOSPITAL ENCOUNTER (OUTPATIENT)
Facility: HOSPITAL | Age: 62
Setting detail: HOSPITAL OUTPATIENT SURGERY
Discharge: HOME | End: 2022-03-21
Attending: INTERNAL MEDICINE | Admitting: INTERNAL MEDICINE

## 2022-03-21 ENCOUNTER — ANESTHESIA EVENT (OUTPATIENT)
Dept: GASTROENTEROLOGY | Facility: HOSPITAL | Age: 62
End: 2022-03-21

## 2022-03-21 VITALS
TEMPERATURE: 97.2 F | HEART RATE: 58 BPM | OXYGEN SATURATION: 97 % | BODY MASS INDEX: 29.95 KG/M2 | SYSTOLIC BLOOD PRESSURE: 131 MMHG | WEIGHT: 226 LBS | DIASTOLIC BLOOD PRESSURE: 89 MMHG | RESPIRATION RATE: 18 BRPM | HEIGHT: 73 IN

## 2022-03-21 PROCEDURE — 45378 DIAGNOSTIC COLONOSCOPY: CPT | Performed by: INTERNAL MEDICINE

## 2022-03-21 PROCEDURE — 25010000002 PROPOFOL 10 MG/ML EMULSION: Performed by: NURSE ANESTHETIST, CERTIFIED REGISTERED

## 2022-03-21 RX ORDER — LIDOCAINE HYDROCHLORIDE 20 MG/ML
INJECTION, SOLUTION EPIDURAL; INFILTRATION; INTRACAUDAL; PERINEURAL AS NEEDED
Status: DISCONTINUED | OUTPATIENT
Start: 2022-03-21 | End: 2022-03-21 | Stop reason: SURG

## 2022-03-21 RX ORDER — LIDOCAINE HYDROCHLORIDE 10 MG/ML
0.5 INJECTION, SOLUTION EPIDURAL; INFILTRATION; INTRACAUDAL; PERINEURAL ONCE AS NEEDED
Status: DISCONTINUED | OUTPATIENT
Start: 2022-03-21 | End: 2022-03-21 | Stop reason: HOSPADM

## 2022-03-21 RX ORDER — ONDANSETRON 2 MG/ML
4 INJECTION INTRAMUSCULAR; INTRAVENOUS ONCE AS NEEDED
Status: DISCONTINUED | OUTPATIENT
Start: 2022-03-21 | End: 2022-03-21 | Stop reason: HOSPADM

## 2022-03-21 RX ORDER — PROPOFOL 10 MG/ML
VIAL (ML) INTRAVENOUS AS NEEDED
Status: DISCONTINUED | OUTPATIENT
Start: 2022-03-21 | End: 2022-03-21 | Stop reason: SURG

## 2022-03-21 RX ORDER — SODIUM CHLORIDE 0.9 % (FLUSH) 0.9 %
10 SYRINGE (ML) INJECTION AS NEEDED
Status: DISCONTINUED | OUTPATIENT
Start: 2022-03-21 | End: 2022-03-21 | Stop reason: HOSPADM

## 2022-03-21 RX ORDER — SODIUM CHLORIDE 9 MG/ML
500 INJECTION, SOLUTION INTRAVENOUS CONTINUOUS PRN
Status: DISCONTINUED | OUTPATIENT
Start: 2022-03-21 | End: 2022-03-21 | Stop reason: HOSPADM

## 2022-03-21 RX ADMIN — PROPOFOL 200 MG: 10 INJECTION, EMULSION INTRAVENOUS at 08:39

## 2022-03-21 RX ADMIN — LIDOCAINE HYDROCHLORIDE 100 MG: 20 INJECTION, SOLUTION EPIDURAL; INFILTRATION; INTRACAUDAL; PERINEURAL at 08:39

## 2022-03-21 RX ADMIN — SODIUM CHLORIDE 500 ML: 9 INJECTION, SOLUTION INTRAVENOUS at 07:50

## 2022-03-21 NOTE — ANESTHESIA POSTPROCEDURE EVALUATION
"Patient: Gregorio Callahan    Procedure Summary     Date: 03/21/22 Room / Location: [facility] ENDOSCOPY 5 / [facility]    Anesthesia Start: 0837 Anesthesia Stop: 0858    Procedure: COLONOSCOPY WITH ANESTHESIA (N/A ) Diagnosis:       Encounter for screening for malignant neoplasm of colon      (Encounter for screening for malignant neoplasm of colon [Z12.11])    Surgeons: Stanislav Clarke MD Provider: Roxanne Rodgers CRNA    Anesthesia Type: MAC ASA Status: 3          Anesthesia Type: MAC    Vitals  Vitals Value Taken Time   /89 03/21/22 0921   Temp     Pulse 59 03/21/22 0924   Resp 18 03/21/22 0920   SpO2 97 % 03/21/22 0915   Vitals shown include unvalidated device data.        Post Anesthesia Care and Evaluation    Patient location during evaluation: PHASE II  Patient participation: complete - patient participated  Level of consciousness: awake and awake and alert  Pain score: 0  Pain management: adequate  Airway patency: patent  Anesthetic complications: No anesthetic complications  PONV Status: none  Cardiovascular status: acceptable  Respiratory status: acceptable  Hydration status: acceptable    Comments: Patient discharged according to acceptable Carmelo score per RN assessment. See nursing records for further information.     Blood pressure 131/89, pulse 58, temperature 97.2 °F (36.2 °C), temperature source Temporal, resp. rate 18, height 185.4 cm (73\"), weight 103 kg (226 lb), SpO2 97 %.        "

## 2022-03-21 NOTE — ANESTHESIA PREPROCEDURE EVALUATION
Anesthesia Evaluation     no history of anesthetic complications:  NPO Solid Status: > 8 hours  NPO Liquid Status: > 2 hours           Airway   Mallampati: I  TM distance: >3 FB  Neck ROM: full  No difficulty expected  Dental      Pulmonary    (+) a smoker Former, sleep apnea on CPAP,   Cardiovascular   Exercise tolerance: good (4-7 METS)    (+) hypertension, CAD, cardiac stents (1 stent placed 2007) more than 12 months ago hyperlipidemia,       Neuro/Psych  (-) seizures, TIA, CVA  GI/Hepatic/Renal/Endo    (-) liver disease, no renal disease, diabetes    Musculoskeletal     Abdominal    Substance History      OB/GYN          Other                        Anesthesia Plan    ASA 3     MAC     intravenous induction     Anesthetic plan, all risks, benefits, and alternatives have been provided, discussed and informed consent has been obtained with: patient.        CODE STATUS:

## 2022-03-21 NOTE — H&P
[facility] Gastroenterology  Pre Procedure History & Physical    Chief Complaint:   Screening    Subjective     HPI:   Screening    Past Medical History:   Past Medical History:   Diagnosis Date   • Benign essential hypertension    • CAD in native artery    • Elevated cholesterol    • H/O acute myocardial infarction    • History of PTCA    • Hyperlipidemia, mild    • Sleep apnea    • Tobacco use disorder        Past Surgical History:  Past Surgical History:   Procedure Laterality Date   • CARDIAC CATHETERIZATION     • COLONOSCOPY     • CORONARY STENT PLACEMENT      x1       Family History:  Family History   Problem Relation Age of Onset   • Heart disease Mother    • Alzheimer's disease Father    • Heart attack Sister    • Heart attack Brother    • No Known Problems Maternal Grandmother    • No Known Problems Maternal Grandfather    • No Known Problems Paternal Grandmother    • No Known Problems Paternal Grandfather    • Colon cancer Neg Hx    • Colon polyps Neg Hx        Social History:   reports that he quit smoking about 15 years ago. His smoking use included cigarettes. He has a 90.00 pack-year smoking history. He has never used smokeless tobacco. He reports current alcohol use. He reports that he does not use drugs.    Medications:   Prior to Admission medications    Medication Sig Start Date End Date Taking? Authorizing Provider   aspirin 81 MG EC tablet Take 81 mg by mouth Daily.   Yes Brigette Gastelum MD   atorvastatin (LIPITOR) 80 MG tablet TAKE 1 TABLET BY MOUTH EVERY DAY 4/29/21  Yes Grey Lynn MD   captopril (CAPOTEN) 25 MG tablet TAKE 1 TABLET BY MOUTH TWICE A DAY 12/13/21  Yes Ajith Barrientos MD   CHONDROITIN SULFATE A PO Take  by mouth.   Yes Brigette Gastelum MD   citalopram (CeleXA) 40 MG tablet TAKE 1 TABLET BY MOUTH EVERY DAY 2/24/22  Yes Ajith Barrientos MD   ezetimibe (ZETIA) 10 MG tablet TAKE 1 TABLET BY MOUTH EVERY DAY 12/21/21  Yes Grey Lynn  "MD mAador   Ginkgo Biloba 40 MG tablet Take 40 mg by mouth Daily.   Yes Brigette Gastelum MD   Glucosamine HCl 1000 MG tablet Take  by mouth.   Yes Brigette Gastelum MD   Misc Natural Products (OSTEO BI-FLEX TRIPLE STRENGTH PO) Take  by mouth 2 (Two) Times a Day.   Yes Brigette Gastelum MD   Potassium 99 MG tablet Take 99 mg by mouth Daily.   Yes Brigette Gastelum MD   traZODone (DESYREL) 50 MG tablet TAKE 1 OR 2 TABLETS BY MOUTH AT BEDTIME AS NEEDED 1/25/21  Yes Ajith Barrientos MD   ibuprofen (ADVIL,MOTRIN) 200 MG tablet Take 200 mg by mouth Every 6 (Six) Hours As Needed for Mild Pain .    Brigette Gastelum MD   nitroglycerin (NITROSTAT) 0.4 MG SL tablet TAKE 1 TABLET BY MOUTH EVERY 5 MINUTES UP TO 3 TIMES AS NEEDED FOR CHEST PAIN. 1/15/19   Ajith Barrientos MD   ondansetron (ZOFRAN) 4 MG tablet Take 1 tablet by mouth Every 8 (Eight) Hours As Needed for Nausea. 11/3/21   Ajith Barrientos MD   VIAGRA 100 MG tablet TAKE AS DIRECTED 6/19/17   Ajith Barrientos MD   icosapent ethyl (VASCEPA) 1 g capsule capsule Take 2 g by mouth 2 (Two) Times a Day With Meals. 1/7/20 3/21/22  Grey Lynn MD   Nutritional Supplements (OSTEO ADVANCE) tablet Take  by mouth 2 (Two) Times a Day.  3/21/22  Brigette Gastelum MD       Allergies:  Patient has no known allergies.    ROS:    General: Weight stable  Resp: No SOA  Cardiovascular: No CP    Objective     Blood pressure 142/80, pulse 63, temperature 97.2 °F (36.2 °C), temperature source Temporal, resp. rate 20, height 185.4 cm (73\"), weight 103 kg (226 lb), SpO2 97 %.    Physical Exam   Constitutional: Pt is oriented to person, place, and in no distress.   Cardiovascular: Normal rate, regular rhythm.    Pulmonary/Chest: Effort normal. No respiratory distress.   Abdominal: Non-distended.  Psychiatric: Mood, memory, affect and judgment appear normal.     Assessment/Plan     Diagnosis:  Screening    Anticipated Surgical " Procedure:  Colonoscopy    The risks, benefits, and alternatives of this procedure have been discussed with the patient or the responsible party- the patient understands and agrees to proceed.    EMR Dragon/transcription disclaimer:  Much of this encounter note is electronic transcription/translation of spoken language to printed text.  The electronic translation of spoken language may be erroneous, or at times, nonsensical words or phrases may be inadvertently transcribed.  Although I have reviewed the note for such errors, some may still exist.

## 2022-04-19 DIAGNOSIS — E78.00 PURE HYPERCHOLESTEROLEMIA: ICD-10-CM

## 2022-04-20 RX ORDER — ATORVASTATIN CALCIUM 80 MG/1
TABLET, FILM COATED ORAL
Qty: 90 TABLET | Refills: 3 | Status: SHIPPED | OUTPATIENT
Start: 2022-04-20

## 2022-04-21 NOTE — TELEPHONE ENCOUNTER
Rx Refill Note  Requested Prescriptions     Pending Prescriptions Disp Refills   • traZODone (DESYREL) 50 MG tablet [Pharmacy Med Name: TRAZODONE 50 MG TABLET] 180 tablet 3     Sig: TAKE 1 OR 2 TABLETS BY MOUTH AT BEDTIME AS NEEDED      Last office visit with prescribing clinician: 1/19/2022      Next office visit with prescribing clinician: 7/27/2022         Cherelle Lee MA  04/21/22, 12:15 CDT

## 2022-04-22 RX ORDER — TRAZODONE HYDROCHLORIDE 50 MG/1
TABLET ORAL
Qty: 180 TABLET | Refills: 3 | Status: SHIPPED | OUTPATIENT
Start: 2022-04-22

## 2022-07-19 ENCOUNTER — OFFICE VISIT (OUTPATIENT)
Dept: FAMILY MEDICINE CLINIC | Facility: CLINIC | Age: 62
End: 2022-07-19

## 2022-07-19 VITALS
SYSTOLIC BLOOD PRESSURE: 132 MMHG | DIASTOLIC BLOOD PRESSURE: 72 MMHG | WEIGHT: 225.2 LBS | RESPIRATION RATE: 16 BRPM | HEART RATE: 61 BPM | OXYGEN SATURATION: 97 % | BODY MASS INDEX: 29.85 KG/M2 | HEIGHT: 73 IN | TEMPERATURE: 98.2 F

## 2022-07-19 DIAGNOSIS — R52 PAIN: Primary | ICD-10-CM

## 2022-07-19 PROCEDURE — 99213 OFFICE O/P EST LOW 20 MIN: CPT | Performed by: FAMILY MEDICINE

## 2022-07-19 PROCEDURE — 96372 THER/PROPH/DIAG INJ SC/IM: CPT | Performed by: FAMILY MEDICINE

## 2022-07-19 RX ORDER — PREDNISONE 10 MG/1
TABLET ORAL
Qty: 21 TABLET | Refills: 0 | Status: SHIPPED | OUTPATIENT
Start: 2022-07-19 | End: 2022-07-27

## 2022-07-19 RX ORDER — TRIAMCINOLONE ACETONIDE 40 MG/ML
40 INJECTION, SUSPENSION INTRA-ARTICULAR; INTRAMUSCULAR ONCE
Status: COMPLETED | OUTPATIENT
Start: 2022-07-19 | End: 2022-07-19

## 2022-07-19 RX ADMIN — TRIAMCINOLONE ACETONIDE 40 MG: 40 INJECTION, SUSPENSION INTRA-ARTICULAR; INTRAMUSCULAR at 15:42

## 2022-07-19 NOTE — PROGRESS NOTES
Subjective   Gregorio Callahan is a 62 y.o. male.     62-year-old male with recurrent back pain and an acute episode this morning while standing      The following portions of the patient's history were reviewed and updated as appropriate: allergies, current medications, past family history, past medical history, past social history, past surgical history and problem list.    Review of Systems   Cardiovascular:        History of ischemic heart disease   Musculoskeletal: Positive for arthralgias and back pain.        History of ruptured test 2 years ago   Neurological:        No radiculopathy       Objective   Physical Exam  Vitals and nursing note reviewed.   Constitutional:       Appearance: He is obese.   Cardiovascular:      Rate and Rhythm: Normal rate and regular rhythm.   Pulmonary:      Effort: Pulmonary effort is normal.      Breath sounds: Normal breath sounds.   Musculoskeletal:      Right lower leg: No edema.      Left lower leg: No edema.   Skin:     General: Skin is warm and dry.   Neurological:      General: No focal deficit present.      Mental Status: He is alert and oriented to person, place, and time.      Coordination: Coordination normal.   Psychiatric:         Mood and Affect: Mood normal.         Behavior: Behavior normal.         Thought Content: Thought content normal.         Judgment: Judgment normal.         Assessment & Plan   Diagnoses and all orders for this visit:    1. Pain (Primary)  -     triamcinolone acetonide (KENALOG-40) injection 40 mg    Other orders  -     predniSONE (DELTASONE) 10 MG (21) dose pack; Use as directed on package start Wednesday morning  Dispense: 21 tablet; Refill: 0       Plan above see MRI 2 years ago

## 2022-07-27 ENCOUNTER — OFFICE VISIT (OUTPATIENT)
Dept: FAMILY MEDICINE CLINIC | Facility: CLINIC | Age: 62
End: 2022-07-27

## 2022-07-27 VITALS
HEIGHT: 73 IN | DIASTOLIC BLOOD PRESSURE: 77 MMHG | WEIGHT: 228.4 LBS | OXYGEN SATURATION: 97 % | HEART RATE: 89 BPM | BODY MASS INDEX: 30.27 KG/M2 | TEMPERATURE: 98.4 F | SYSTOLIC BLOOD PRESSURE: 118 MMHG

## 2022-07-27 DIAGNOSIS — R51.9 WORSENING HEADACHES: ICD-10-CM

## 2022-07-27 DIAGNOSIS — R53.83 FATIGUE, UNSPECIFIED TYPE: ICD-10-CM

## 2022-07-27 DIAGNOSIS — E03.9 HYPOTHYROIDISM, UNSPECIFIED TYPE: ICD-10-CM

## 2022-07-27 DIAGNOSIS — R41.3 MEMORY LOSS: ICD-10-CM

## 2022-07-27 DIAGNOSIS — E78.2 MIXED HYPERLIPIDEMIA: Primary | ICD-10-CM

## 2022-07-27 PROCEDURE — 99214 OFFICE O/P EST MOD 30 MIN: CPT | Performed by: FAMILY MEDICINE

## 2022-07-27 RX ORDER — AMOXICILLIN 875 MG/1
875 TABLET, COATED ORAL 2 TIMES DAILY
COMMUNITY
Start: 2022-07-22 | End: 2022-09-26

## 2022-07-27 RX ORDER — HYDROCODONE BITARTRATE AND ACETAMINOPHEN 5; 325 MG/1; MG/1
TABLET ORAL SEE ADMIN INSTRUCTIONS
COMMUNITY
Start: 2022-07-22 | End: 2022-11-15

## 2022-07-27 NOTE — PROGRESS NOTES
"Gregorio Callahan    1960    Chief Complaint   Patient presents with   • Follow-up     Six month follow up HTN, hyperlipidemia - fasting       Vitals:    07/27/22 0744   BP: 118/77   BP Location: Left arm   Patient Position: Sitting   Cuff Size: Large Adult   Pulse: 89   Temp: 98.4 °F (36.9 °C)   TempSrc: Temporal   SpO2: 97%   Weight: 104 kg (228 lb 6.4 oz)   Height: 185.4 cm (73\")   PainSc: 0-No pain       62-year-old patient with known heart disease planing of memory loss and a positive family history for Alzheimer's      Review of Systems   Respiratory: Negative for shortness of breath.    Cardiovascular: Negative for chest pain and leg swelling.   Musculoskeletal: Positive for back pain.        Back improved   Neurological:        Memory loss noticed       Past Medical History:   Diagnosis Date   • Benign essential hypertension    • CAD in native artery    • Elevated cholesterol    • H/O acute myocardial infarction    • History of PTCA    • Hyperlipidemia, mild    • Sleep apnea    • Tobacco use disorder          Current Outpatient Medications:   •  aspirin 81 MG EC tablet, Take 81 mg by mouth Daily., Disp: , Rfl:   •  atorvastatin (LIPITOR) 80 MG tablet, TAKE 1 TABLET BY MOUTH EVERY DAY, Disp: 90 tablet, Rfl: 3  •  captopril (CAPOTEN) 25 MG tablet, TAKE 1 TABLET BY MOUTH TWICE A DAY, Disp: 180 tablet, Rfl: 3  •  citalopram (CeleXA) 40 MG tablet, TAKE 1 TABLET BY MOUTH EVERY DAY, Disp: 90 tablet, Rfl: 3  •  ezetimibe (ZETIA) 10 MG tablet, TAKE 1 TABLET BY MOUTH EVERY DAY, Disp: 90 tablet, Rfl: 3  •  Ginkgo Biloba 40 MG tablet, Take 40 mg by mouth Daily., Disp: , Rfl:   •  ibuprofen (ADVIL,MOTRIN) 200 MG tablet, Take 200 mg by mouth Every 6 (Six) Hours As Needed for Mild Pain ., Disp: , Rfl:   •  Misc Natural Products (OSTEO BI-FLEX TRIPLE STRENGTH PO), Take  by mouth 2 (Two) Times a Day., Disp: , Rfl:   •  nitroglycerin (NITROSTAT) 0.4 MG SL tablet, TAKE 1 TABLET BY MOUTH EVERY 5 MINUTES UP TO 3 TIMES AS NEEDED " FOR CHEST PAIN., Disp: 25 tablet, Rfl: 2  •  Potassium 99 MG tablet, Take 99 mg by mouth Daily., Disp: , Rfl:   •  traZODone (DESYREL) 50 MG tablet, TAKE 1 OR 2 TABLETS BY MOUTH AT BEDTIME AS NEEDED, Disp: 180 tablet, Rfl: 3  •  VIAGRA 100 MG tablet, TAKE AS DIRECTED, Disp: 6 tablet, Rfl: 3  •  amoxicillin (AMOXIL) 875 MG tablet, Take 875 mg by mouth 2 (Two) Times a Day. for 7 days, Disp: , Rfl:   •  HYDROcodone-acetaminophen (NORCO) 5-325 MG per tablet, Take  by mouth See Admin Instructions. Take 1 tablet by mouth every 4-6 hours as needed for pain, Disp: , Rfl:     No Known Allergies    Patient Active Problem List   Diagnosis   • Hyperlipidemia, mild   • Benign essential hypertension   • CAD in native artery   • H/O acute myocardial infarction   • History of PTCA   • SZYMANSKI (dyspnea on exertion)   • Encounter for screening for malignant neoplasm of colon       Social History     Socioeconomic History   • Marital status:    Tobacco Use   • Smoking status: Former Smoker     Packs/day: 3.00     Years: 30.00     Pack years: 90.00     Types: Cigarettes     Quit date: 2007     Years since quitting: 15.5   • Smokeless tobacco: Never Used   • Tobacco comment: age quit smokin   Vaping Use   • Vaping Use: Never used   Substance and Sexual Activity   • Alcohol use: Yes     Comment: Occasional alcohol use. Drinks beer.   • Drug use: No   • Sexual activity: Defer       Past Surgical History:   Procedure Laterality Date   • CARDIAC CATHETERIZATION     • COLONOSCOPY     • COLONOSCOPY N/A 3/21/2022    Procedure: COLONOSCOPY WITH ANESTHESIA;  Surgeon: Stanislav Clarke MD;  Location: [facility] ENDOSCOPY;  Service: Gastroenterology;  Laterality: N/A;  pre screen  post normal  Settle, Ajith Garcia MD   • CORONARY STENT PLACEMENT      x1       Physical Exam  Vitals and nursing note reviewed.   Constitutional:       Appearance: Normal appearance.   Eyes:      Extraocular Movements: Extraocular movements intact.       "Pupils: Pupils are equal, round, and reactive to light.   Cardiovascular:      Rate and Rhythm: Normal rate and regular rhythm.      Pulses: Normal pulses.      Heart sounds: Normal heart sounds.   Pulmonary:      Effort: Pulmonary effort is normal.      Breath sounds: Normal breath sounds.   Abdominal:      General: Abdomen is flat.      Palpations: Abdomen is soft.   Musculoskeletal:      Right lower leg: No edema.      Left lower leg: No edema.   Skin:     General: Skin is warm and dry.   Neurological:      General: No focal deficit present.      Mental Status: He is alert and oriented to person, place, and time.   Psychiatric:         Mood and Affect: Mood normal.         Behavior: Behavior normal.         Vitals:    07/27/22 0744   BP: 118/77   BP Location: Left arm   Patient Position: Sitting   Cuff Size: Large Adult   Pulse: 89   Temp: 98.4 °F (36.9 °C)   TempSrc: Temporal   SpO2: 97%   Weight: 104 kg (228 lb 6.4 oz)   Height: 185.4 cm (73\")     BMI is >= 30 and <35. (Class 1 Obesity). The following options were offered after discussion;: exercise counseling/recommendations      Diagnoses and all orders for this visit:    1. Mixed hyperlipidemia (Primary)  -     Comprehensive Metabolic Panel  -     Lipid Panel    2. Hypothyroidism, unspecified type  -     TSH  -     T4, free    3. Memory loss  -     Vitamin B12  -     Folate  -     CT Head Without Contrast; Future    4. Fatigue, unspecified type  -     CBC & Differential    5. Worsening headaches  -     CT Head Without Contrast; Future        Problems Addressed this Visit    None     Visit Diagnoses     Mixed hyperlipidemia    -  Primary    Relevant Orders    Comprehensive Metabolic Panel    Lipid Panel    Hypothyroidism, unspecified type        Relevant Orders    TSH    T4, free    Memory loss        Relevant Orders    Vitamin B12    Folate    CT Head Without Contrast    Fatigue, unspecified type        Relevant Orders    CBC & Differential    Worsening " headaches        Relevant Orders    CT Head Without Contrast      Diagnoses       Codes Comments    Mixed hyperlipidemia    -  Primary ICD-10-CM: E78.2  ICD-9-CM: 272.2     Hypothyroidism, unspecified type     ICD-10-CM: E03.9  ICD-9-CM: 244.9     Memory loss     ICD-10-CM: R41.3  ICD-9-CM: 780.93     Fatigue, unspecified type     ICD-10-CM: R53.83  ICD-9-CM: 780.79     Worsening headaches     ICD-10-CM: R51.9  ICD-9-CM: 784.0           Health Maintenance:  Immunization History   Administered Date(s) Administered   • COVID-19 (MODERNA) 1st, 2nd, 3rd Dose Only 03/16/2021, 04/13/2021, 11/11/2021   • Flu Vaccine Intradermal Quad 18-64YR 10/18/2017   • Flu Vaccine Quad PF >36MO 11/12/2016   • FluLaval/Fluarix/Fluzone >6 10/25/2021   • Fluad Quad 65+ 08/28/2020   • Pneumococcal Conjugate 13-Valent (PCV13) 08/10/2016   • Pneumococcal Polysaccharide (PPSV23) 10/23/2018   • Shingrix 11/18/2019, 07/16/2020   • Tdap 10/18/2017   • flucelvax quad pfs =>4 YRS 10/23/2018, 11/12/2019                  Plan above-patient concerned about memory loss-stressed Mediterranean diet exercise use of nondominant part of brain blood pressure cholesterol control                Electronically signed by Ajith Barrientos MD 07/27/2022

## 2022-07-28 ENCOUNTER — TELEPHONE (OUTPATIENT)
Dept: FAMILY MEDICINE CLINIC | Facility: CLINIC | Age: 62
End: 2022-07-28

## 2022-07-28 LAB
ALBUMIN SERPL-MCNC: 4.6 G/DL (ref 3.5–5.2)
ALBUMIN/GLOB SERPL: 3.3 G/DL
ALP SERPL-CCNC: 84 U/L (ref 39–117)
ALT SERPL-CCNC: 35 U/L (ref 1–41)
AST SERPL-CCNC: 28 U/L (ref 1–40)
BASOPHILS # BLD AUTO: 0.01 10*3/MM3 (ref 0–0.2)
BASOPHILS NFR BLD AUTO: 0.2 % (ref 0–1.5)
BILIRUB SERPL-MCNC: 0.8 MG/DL (ref 0–1.2)
BUN SERPL-MCNC: 16 MG/DL (ref 8–23)
BUN/CREAT SERPL: 16.5 (ref 7–25)
CALCIUM SERPL-MCNC: 8.7 MG/DL (ref 8.6–10.5)
CHLORIDE SERPL-SCNC: 106 MMOL/L (ref 98–107)
CHOLEST SERPL-MCNC: 102 MG/DL (ref 0–200)
CO2 SERPL-SCNC: 28.5 MMOL/L (ref 22–29)
CREAT SERPL-MCNC: 0.97 MG/DL (ref 0.76–1.27)
EGFRCR SERPLBLD CKD-EPI 2021: 88.8 ML/MIN/1.73
EOSINOPHIL # BLD AUTO: 0.03 10*3/MM3 (ref 0–0.4)
EOSINOPHIL NFR BLD AUTO: 0.6 % (ref 0.3–6.2)
ERYTHROCYTE [DISTWIDTH] IN BLOOD BY AUTOMATED COUNT: 14.3 % (ref 12.3–15.4)
FOLATE SERPL-MCNC: 10.2 NG/ML (ref 4.78–24.2)
GLOBULIN SER CALC-MCNC: 1.4 GM/DL
GLUCOSE SERPL-MCNC: 86 MG/DL (ref 65–99)
HCT VFR BLD AUTO: 41.6 % (ref 37.5–51)
HDLC SERPL-MCNC: 35 MG/DL (ref 40–60)
HGB BLD-MCNC: 13.7 G/DL (ref 13–17.7)
IMM GRANULOCYTES # BLD AUTO: 0.04 10*3/MM3 (ref 0–0.05)
IMM GRANULOCYTES NFR BLD AUTO: 0.8 % (ref 0–0.5)
LDLC SERPL CALC-MCNC: 42 MG/DL (ref 0–100)
LYMPHOCYTES # BLD AUTO: 1.1 10*3/MM3 (ref 0.7–3.1)
LYMPHOCYTES NFR BLD AUTO: 23.4 % (ref 19.6–45.3)
MCH RBC QN AUTO: 30.4 PG (ref 26.6–33)
MCHC RBC AUTO-ENTMCNC: 32.9 G/DL (ref 31.5–35.7)
MCV RBC AUTO: 92.2 FL (ref 79–97)
MONOCYTES # BLD AUTO: 0.8 10*3/MM3 (ref 0.1–0.9)
MONOCYTES NFR BLD AUTO: 17 % (ref 5–12)
NEUTROPHILS # BLD AUTO: 2.73 10*3/MM3 (ref 1.7–7)
NEUTROPHILS NFR BLD AUTO: 58 % (ref 42.7–76)
PLATELET # BLD AUTO: 111 10*3/MM3 (ref 140–450)
POTASSIUM SERPL-SCNC: 4.2 MMOL/L (ref 3.5–5.2)
PROT SERPL-MCNC: 6 G/DL (ref 6–8.5)
RBC # BLD AUTO: 4.51 10*6/MM3 (ref 4.14–5.8)
SODIUM SERPL-SCNC: 145 MMOL/L (ref 136–145)
T4 FREE SERPL-MCNC: 1.06 NG/DL (ref 0.93–1.7)
TRIGL SERPL-MCNC: 148 MG/DL (ref 0–150)
TSH SERPL DL<=0.005 MIU/L-ACNC: 1.95 UIU/ML (ref 0.27–4.2)
VIT B12 SERPL-MCNC: 387 PG/ML (ref 211–946)
VLDLC SERPL CALC-MCNC: 25 MG/DL (ref 5–40)
WBC # BLD AUTO: 4.71 10*3/MM3 (ref 3.4–10.8)

## 2022-07-28 RX ORDER — LANOLIN ALCOHOL/MO/W.PET/CERES
1000 CREAM (GRAM) TOPICAL DAILY
COMMUNITY

## 2022-09-26 ENCOUNTER — OFFICE VISIT (OUTPATIENT)
Dept: FAMILY MEDICINE CLINIC | Facility: CLINIC | Age: 62
End: 2022-09-26

## 2022-09-26 VITALS
SYSTOLIC BLOOD PRESSURE: 138 MMHG | TEMPERATURE: 98.2 F | HEIGHT: 73 IN | OXYGEN SATURATION: 98 % | HEART RATE: 69 BPM | BODY MASS INDEX: 28.23 KG/M2 | DIASTOLIC BLOOD PRESSURE: 78 MMHG | WEIGHT: 213 LBS

## 2022-09-26 DIAGNOSIS — M25.561 CHRONIC PAIN OF BOTH KNEES: Primary | ICD-10-CM

## 2022-09-26 DIAGNOSIS — G89.29 CHRONIC PAIN OF BOTH KNEES: Primary | ICD-10-CM

## 2022-09-26 DIAGNOSIS — M25.562 CHRONIC PAIN OF BOTH KNEES: Primary | ICD-10-CM

## 2022-09-26 PROCEDURE — 96372 THER/PROPH/DIAG INJ SC/IM: CPT | Performed by: NURSE PRACTITIONER

## 2022-09-26 PROCEDURE — 99213 OFFICE O/P EST LOW 20 MIN: CPT | Performed by: NURSE PRACTITIONER

## 2022-09-26 RX ORDER — PREDNISONE 10 MG/1
TABLET ORAL
Qty: 21 TABLET | Refills: 0 | Status: SHIPPED | OUTPATIENT
Start: 2022-09-26 | End: 2022-10-03

## 2022-09-26 RX ORDER — TRIAMCINOLONE ACETONIDE 40 MG/ML
40 INJECTION, SUSPENSION INTRA-ARTICULAR; INTRAMUSCULAR ONCE
Status: COMPLETED | OUTPATIENT
Start: 2022-09-26 | End: 2022-09-26

## 2022-09-26 RX ADMIN — TRIAMCINOLONE ACETONIDE 40 MG: 40 INJECTION, SUSPENSION INTRA-ARTICULAR; INTRAMUSCULAR at 11:02

## 2022-09-26 NOTE — PROGRESS NOTES
CC: bilateral knee pain    History:  Gregorio Callahan is a 62 y.o. male who presents today for evaluation of the above problems.      Patient reports that approximately 20 years ago he developed pain in both of his knees.  At that time he started taking Osteo Bi-Flex and his pain resolved and he has not had any issues subsequently up until approximately a month ago.  At that time he began experiencing pain in both of his knees that gets worse throughout the day.  It is activity dependent and his knees both generally hurt with any bending, squatting, walking.  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    HPI  ROS:  Review of Systems   Musculoskeletal: Positive for arthralgias.       No Known Allergies  Past Medical History:   Diagnosis Date   • Benign essential hypertension    • CAD in native artery    • Elevated cholesterol    • H/O acute myocardial infarction    • History of PTCA    • Hyperlipidemia, mild    • Sleep apnea    • Tobacco use disorder      Past Surgical History:   Procedure Laterality Date   • CARDIAC CATHETERIZATION     • COLONOSCOPY     • COLONOSCOPY N/A 3/21/2022    Procedure: COLONOSCOPY WITH ANESTHESIA;  Surgeon: Stanislav Clarke MD;  Location: [facility] ENDOSCOPY;  Service: Gastroenterology;  Laterality: N/A;  pre screen  post normal  Settle, Ajith Garcia MD   • CORONARY STENT PLACEMENT      x1     Family History   Problem Relation Age of Onset   • Heart disease Mother    • Alzheimer's disease Father    • Heart attack Sister    • Heart attack Brother    • No Known Problems Maternal Grandmother    • No Known Problems Maternal Grandfather    • No Known Problems Paternal Grandmother    • No Known Problems Paternal Grandfather    • Colon cancer Neg Hx    • Colon polyps Neg Hx       reports that he quit smoking about 15 years ago. His smoking use included cigarettes. He has a 90.00 pack-year smoking history. He has never used  "smokeless tobacco. He reports current alcohol use. He reports that he does not use drugs.      Current Outpatient Medications:   •  aspirin 81 MG EC tablet, Take 81 mg by mouth Daily., Disp: , Rfl:   •  atorvastatin (LIPITOR) 80 MG tablet, TAKE 1 TABLET BY MOUTH EVERY DAY, Disp: 90 tablet, Rfl: 3  •  captopril (CAPOTEN) 25 MG tablet, TAKE 1 TABLET BY MOUTH TWICE A DAY, Disp: 180 tablet, Rfl: 3  •  citalopram (CeleXA) 40 MG tablet, TAKE 1 TABLET BY MOUTH EVERY DAY, Disp: 90 tablet, Rfl: 3  •  ezetimibe (ZETIA) 10 MG tablet, TAKE 1 TABLET BY MOUTH EVERY DAY, Disp: 90 tablet, Rfl: 3  •  Ginkgo Biloba 40 MG tablet, Take 40 mg by mouth Daily., Disp: , Rfl:   •  HYDROcodone-acetaminophen (NORCO) 5-325 MG per tablet, Take  by mouth See Admin Instructions. Take 1 tablet by mouth every 4-6 hours as needed for pain, Disp: , Rfl:   •  ibuprofen (ADVIL,MOTRIN) 200 MG tablet, Take 200 mg by mouth Every 6 (Six) Hours As Needed for Mild Pain ., Disp: , Rfl:   •  Misc Natural Products (OSTEO BI-FLEX TRIPLE STRENGTH PO), Take  by mouth 2 (Two) Times a Day., Disp: , Rfl:   •  nitroglycerin (NITROSTAT) 0.4 MG SL tablet, TAKE 1 TABLET BY MOUTH EVERY 5 MINUTES UP TO 3 TIMES AS NEEDED FOR CHEST PAIN., Disp: 25 tablet, Rfl: 2  •  Potassium 99 MG tablet, Take 99 mg by mouth Daily., Disp: , Rfl:   •  traZODone (DESYREL) 50 MG tablet, TAKE 1 OR 2 TABLETS BY MOUTH AT BEDTIME AS NEEDED, Disp: 180 tablet, Rfl: 3  •  VIAGRA 100 MG tablet, TAKE AS DIRECTED, Disp: 6 tablet, Rfl: 3  •  vitamin B-12 (CYANOCOBALAMIN) 1000 MCG tablet, Take 1,000 mcg by mouth Daily., Disp: , Rfl:   •  predniSONE (DELTASONE) 10 MG (21) dose pack, Use as directed on package, Disp: 21 tablet, Rfl: 0  No current facility-administered medications for this visit.    OBJECTIVE:  /78 (BP Location: Left arm, Patient Position: Sitting, Cuff Size: Adult)   Pulse 69   Temp 98.2 °F (36.8 °C)   Ht 185.4 cm (73\")   Wt 96.6 kg (213 lb)   SpO2 98%   BMI 28.10 kg/m²  "   Physical Exam  Vitals reviewed.   Constitutional:       Appearance: He is well-developed.   Cardiovascular:      Rate and Rhythm: Normal rate.   Pulmonary:      Effort: Pulmonary effort is normal.   Musculoskeletal:      Right knee: No swelling, deformity, effusion or erythema. Normal range of motion. No tenderness.      Left knee: No swelling, deformity, effusion or erythema. Normal range of motion. No tenderness.   Neurological:      Mental Status: He is alert and oriented to person, place, and time.   Psychiatric:         Behavior: Behavior normal.         Assessment/Plan    Diagnoses and all orders for this visit:    1. Chronic pain of both knees (Primary)  -     triamcinolone acetonide (KENALOG-40) injection 40 mg  -     XR Knee 3 View Right; Future  -     XR Knee 3 View Left; Future  -     predniSONE (DELTASONE) 10 MG (21) dose pack; Use as directed on package  Dispense: 21 tablet; Refill: 0        An After Visit Summary was printed and given to the patient at discharge.  Return if symptoms worsen or fail to improve, for Next scheduled follow up.       CHRISTIANA Jessica 9/26/22  Electronically signed.

## 2022-09-28 NOTE — PROGRESS NOTES
Both xrays look ok. No evident decreased joint space. If symptoms don't improve after finishing steroid pills let us know.

## 2022-10-03 ENCOUNTER — OFFICE VISIT (OUTPATIENT)
Dept: FAMILY MEDICINE CLINIC | Facility: CLINIC | Age: 62
End: 2022-10-03

## 2022-10-03 VITALS
BODY MASS INDEX: 28.04 KG/M2 | HEIGHT: 73 IN | TEMPERATURE: 97.7 F | DIASTOLIC BLOOD PRESSURE: 72 MMHG | HEART RATE: 55 BPM | SYSTOLIC BLOOD PRESSURE: 121 MMHG | OXYGEN SATURATION: 97 % | WEIGHT: 211.6 LBS

## 2022-10-03 DIAGNOSIS — L57.0 ACTINIC KERATOSIS: Primary | ICD-10-CM

## 2022-10-03 PROCEDURE — 17000 DESTRUCT PREMALG LESION: CPT | Performed by: NURSE PRACTITIONER

## 2022-10-05 LAB
DX ICD CODE: NORMAL
DX ICD CODE: NORMAL
PATH REPORT.FINAL DX SPEC: NORMAL
PATH REPORT.GROSS SPEC: NORMAL
PATH REPORT.SITE OF ORIGIN SPEC: NORMAL
PATHOLOGIST NAME: NORMAL
PAYMENT PROCEDURE: NORMAL

## 2022-10-25 ENCOUNTER — IMMUNIZATION (OUTPATIENT)
Dept: FAMILY MEDICINE CLINIC | Facility: CLINIC | Age: 62
End: 2022-10-25

## 2022-10-25 ENCOUNTER — FLU SHOT (OUTPATIENT)
Dept: FAMILY MEDICINE CLINIC | Facility: CLINIC | Age: 62
End: 2022-10-25

## 2022-10-25 DIAGNOSIS — Z23 NEED FOR INFLUENZA VACCINATION: Primary | ICD-10-CM

## 2022-10-25 DIAGNOSIS — Z23 NEED FOR VACCINATION: Primary | ICD-10-CM

## 2022-10-25 PROCEDURE — 91312 COVID-19 (PFIZER) BIVALENT BOOSTER 12+YRS: CPT | Performed by: FAMILY MEDICINE

## 2022-10-25 PROCEDURE — 90686 IIV4 VACC NO PRSV 0.5 ML IM: CPT | Performed by: FAMILY MEDICINE

## 2022-10-25 PROCEDURE — 90471 IMMUNIZATION ADMIN: CPT | Performed by: FAMILY MEDICINE

## 2022-10-25 PROCEDURE — 0124A COVID-19 (PFIZER) BIVALENT BOOSTER 12+YRS: CPT | Performed by: FAMILY MEDICINE

## 2022-11-15 ENCOUNTER — OFFICE VISIT (OUTPATIENT)
Dept: CARDIOLOGY | Facility: CLINIC | Age: 62
End: 2022-11-15

## 2022-11-15 VITALS
SYSTOLIC BLOOD PRESSURE: 120 MMHG | HEART RATE: 58 BPM | HEIGHT: 73 IN | BODY MASS INDEX: 27.57 KG/M2 | DIASTOLIC BLOOD PRESSURE: 70 MMHG | WEIGHT: 208 LBS

## 2022-11-15 DIAGNOSIS — I10 BENIGN ESSENTIAL HYPERTENSION: ICD-10-CM

## 2022-11-15 DIAGNOSIS — I25.10 CAD IN NATIVE ARTERY: Primary | ICD-10-CM

## 2022-11-15 DIAGNOSIS — E78.5 HYPERLIPIDEMIA, MILD: ICD-10-CM

## 2022-11-15 PROCEDURE — 99213 OFFICE O/P EST LOW 20 MIN: CPT | Performed by: INTERNAL MEDICINE

## 2022-11-15 PROCEDURE — 93000 ELECTROCARDIOGRAM COMPLETE: CPT | Performed by: INTERNAL MEDICINE

## 2022-11-15 NOTE — PROGRESS NOTES
Subjective    Gregorio Callahan is a 62 y.o. male. Fu of ihd and risks    History of Present Illness     IHD:  He is very active with manual work and has good and stable stamina. Meds are stable. EKG today is nsc x the QRS-V has increased. Never has cp or unusual soa. Stress ECHO  was 'low risk'.    HTN:  Stable meds. Does not check bp at home but gets good offce checks.    HLD:  Good LDL control (=42 ) on a potent statin. He is eating a Mediterranean style now.      The following portions of the patient's history were reviewed and updated as appropriate: allergies, current medications, past family history, past medical history, past social history, past surgical history and problem list.    Patient Active Problem List   Diagnosis   • Hyperlipidemia, mild   • Benign essential hypertension   • CAD in native artery   • H/O acute myocardial infarction   • History of PTCA   • SZYMANSKI (dyspnea on exertion)   • Encounter for screening for malignant neoplasm of colon       No Known Allergies    Family History   Problem Relation Age of Onset   • Heart disease Mother    • Alzheimer's disease Father    • Heart attack Sister    • Heart attack Brother    • No Known Problems Maternal Grandmother    • No Known Problems Maternal Grandfather    • No Known Problems Paternal Grandmother    • No Known Problems Paternal Grandfather    • Colon cancer Neg Hx    • Colon polyps Neg Hx        Social History     Socioeconomic History   • Marital status:    Tobacco Use   • Smoking status: Former     Packs/day: 3.00     Years: 30.00     Pack years: 90.00     Types: Cigarettes     Quit date: 2007     Years since quitting: 15.8   • Smokeless tobacco: Never   • Tobacco comments:     age quit smokin   Vaping Use   • Vaping Use: Never used   Substance and Sexual Activity   • Alcohol use: Yes     Comment: Occasional alcohol use. Drinks beer.   • Drug use: No   • Sexual activity: Defer         Current Outpatient Medications:   •   aspirin 81 MG EC tablet, Take 81 mg by mouth Daily., Disp: , Rfl:   •  atorvastatin (LIPITOR) 80 MG tablet, TAKE 1 TABLET BY MOUTH EVERY DAY, Disp: 90 tablet, Rfl: 3  •  captopril (CAPOTEN) 25 MG tablet, TAKE 1 TABLET BY MOUTH TWICE A DAY, Disp: 180 tablet, Rfl: 3  •  citalopram (CeleXA) 40 MG tablet, TAKE 1 TABLET BY MOUTH EVERY DAY, Disp: 90 tablet, Rfl: 3  •  ezetimibe (ZETIA) 10 MG tablet, TAKE 1 TABLET BY MOUTH EVERY DAY, Disp: 90 tablet, Rfl: 3  •  Ginkgo Biloba 40 MG tablet, Take 40 mg by mouth Daily., Disp: , Rfl:   •  ibuprofen (ADVIL,MOTRIN) 200 MG tablet, Take 200 mg by mouth Every 6 (Six) Hours As Needed for Mild Pain ., Disp: , Rfl:   •  Misc Natural Products (OSTEO BI-FLEX TRIPLE STRENGTH PO), Take  by mouth 2 (Two) Times a Day., Disp: , Rfl:   •  nitroglycerin (NITROSTAT) 0.4 MG SL tablet, TAKE 1 TABLET BY MOUTH EVERY 5 MINUTES UP TO 3 TIMES AS NEEDED FOR CHEST PAIN., Disp: 25 tablet, Rfl: 2  •  Potassium 99 MG tablet, Take 99 mg by mouth Daily., Disp: , Rfl:   •  traZODone (DESYREL) 50 MG tablet, TAKE 1 OR 2 TABLETS BY MOUTH AT BEDTIME AS NEEDED, Disp: 180 tablet, Rfl: 3  •  VIAGRA 100 MG tablet, TAKE AS DIRECTED, Disp: 6 tablet, Rfl: 3  •  vitamin B-12 (CYANOCOBALAMIN) 1000 MCG tablet, Take 1,000 mcg by mouth Daily., Disp: , Rfl:     Past Surgical History:   Procedure Laterality Date   • CARDIAC CATHETERIZATION     • COLONOSCOPY     • COLONOSCOPY N/A 3/21/2022    Procedure: COLONOSCOPY WITH ANESTHESIA;  Surgeon: Stanislav Clarke MD;  Location: [facility] ENDOSCOPY;  Service: Gastroenterology;  Laterality: N/A;  pre screen  post normal  Settle, Ajith Garcia MD   • CORONARY STENT PLACEMENT      x1       Review of Systems   Constitutional: Negative for activity change, appetite change, fatigue and unexpected weight change.   Respiratory: Negative for shortness of breath.    Cardiovascular: Negative for chest pain, palpitations and leg swelling.   Gastrointestinal: Negative for abdominal pain and blood  "in stool.   Genitourinary: Negative for difficulty urinating and hematuria.   Musculoskeletal: Positive for arthralgias and back pain.       /70   Pulse 58   Ht 185.4 cm (73\")   Wt 94.3 kg (208 lb)   BMI 27.44 kg/m²   Procedures    Objective   Physical Exam  Constitutional:       Appearance: Normal appearance.   Cardiovascular:      Rate and Rhythm: Normal rate and regular rhythm.      Pulses: Normal pulses.      Heart sounds: Normal heart sounds. No murmur heard.    No friction rub. No gallop.   Pulmonary:      Effort: Pulmonary effort is normal.      Breath sounds: Normal breath sounds. No wheezing or rales.   Abdominal:      General: Bowel sounds are normal.      Tenderness: There is no abdominal tenderness.   Musculoskeletal:      Right lower leg: No edema.      Left lower leg: No edema.   Skin:     General: Skin is warm and dry.   Neurological:      General: No focal deficit present.   Psychiatric:         Mood and Affect: Mood normal.         Assessment & Plan   Diagnoses and all orders for this visit:    1. CAD in native artery (Primary)  Comments:  no angina and good stamina  Orders:  -     ECG 12 Lead    2. Hyperlipidemia, mild  Comments:  very good LDL control    3. Benign essential hypertension  Comments:  controlled                 Return in about 1 year (around 11/15/2023).  Orders Placed This Encounter   Procedures   • ECG 12 Lead     Order Specific Question:   Reason for Exam:     Answer:   [email or web address]     Order Specific Question:   Release to patient     Answer:   Routine Release     "

## 2022-12-06 DIAGNOSIS — E78.2 MIXED HYPERLIPIDEMIA: ICD-10-CM

## 2022-12-06 RX ORDER — CAPTOPRIL 25 MG/1
TABLET ORAL
Qty: 180 TABLET | Refills: 0 | Status: SHIPPED | OUTPATIENT
Start: 2022-12-06 | End: 2023-03-03

## 2022-12-06 RX ORDER — EZETIMIBE 10 MG/1
TABLET ORAL
Qty: 90 TABLET | Refills: 3 | Status: SHIPPED | OUTPATIENT
Start: 2022-12-06

## 2022-12-06 NOTE — TELEPHONE ENCOUNTER
Rx Refill Note  Requested Prescriptions     Pending Prescriptions Disp Refills   • captopril (CAPOTEN) 25 MG tablet [Pharmacy Med Name: CAPTOPRIL 25 MG TABLET] 180 tablet 3     Sig: TAKE 1 TABLET BY MOUTH TWICE A DAY      Last office visit with prescribing clinician: 7/27/2022   Last telemedicine visit with prescribing clinician: 1/31/2023   Next office visit with prescribing clinician: 1/31/2023   CPE done 01/20/2022    {TIP  Please add Last Relevant Lab Date if appropriate: 07/27/2022             {TIP  Is Refill Pharmacy correct?: yes      Would you like a call back once the refill request has been completed: [] Yes [] No    If the office needs to give you a call back, can they leave a voicemail: [] Yes [] No    Kat Berger MA  12/06/22, 15:25 CST

## 2022-12-20 ENCOUNTER — DOCUMENTATION (OUTPATIENT)
Dept: CT IMAGING | Facility: HOSPITAL | Age: 62
End: 2022-12-20

## 2022-12-20 NOTE — PROGRESS NOTES
A notification letter was sent to patient as a reminder that CT lung cancer screening is past due.

## 2023-01-26 ENCOUNTER — LAB (OUTPATIENT)
Dept: FAMILY MEDICINE CLINIC | Facility: CLINIC | Age: 63
End: 2023-01-26
Payer: COMMERCIAL

## 2023-01-26 DIAGNOSIS — Z12.5 SPECIAL SCREENING FOR MALIGNANT NEOPLASM OF PROSTATE: ICD-10-CM

## 2023-01-26 DIAGNOSIS — Z00.00 ROUTINE GENERAL MEDICAL EXAMINATION AT A HEALTH CARE FACILITY: Primary | ICD-10-CM

## 2023-01-27 LAB
ALBUMIN SERPL-MCNC: 5.2 G/DL (ref 3.5–5.2)
ALBUMIN/GLOB SERPL: 3.3 G/DL
ALP SERPL-CCNC: 85 U/L (ref 39–117)
ALT SERPL-CCNC: 20 U/L (ref 1–41)
AST SERPL-CCNC: 28 U/L (ref 1–40)
BASOPHILS # BLD AUTO: ABNORMAL 10*3/UL
BASOPHILS # BLD MANUAL: 0.07 10*3/MM3 (ref 0–0.2)
BASOPHILS NFR BLD MANUAL: 2.2 % (ref 0–1.5)
BILIRUB SERPL-MCNC: 0.7 MG/DL (ref 0–1.2)
BUN SERPL-MCNC: 13 MG/DL (ref 8–23)
BUN/CREAT SERPL: 12.4 (ref 7–25)
CALCIUM SERPL-MCNC: 9.2 MG/DL (ref 8.6–10.5)
CHLORIDE SERPL-SCNC: 105 MMOL/L (ref 98–107)
CHOLEST SERPL-MCNC: 107 MG/DL (ref 0–200)
CO2 SERPL-SCNC: 27.1 MMOL/L (ref 22–29)
CREAT SERPL-MCNC: 1.05 MG/DL (ref 0.76–1.27)
DIFFERENTIAL COMMENT: ABNORMAL
EGFRCR SERPLBLD CKD-EPI 2021: 80.8 ML/MIN/1.73
EOSINOPHIL # BLD AUTO: ABNORMAL 10*3/UL
EOSINOPHIL NFR BLD AUTO: ABNORMAL %
ERYTHROCYTE [DISTWIDTH] IN BLOOD BY AUTOMATED COUNT: 12.6 % (ref 12.3–15.4)
GLOBULIN SER CALC-MCNC: 1.6 GM/DL
GLUCOSE SERPL-MCNC: 92 MG/DL (ref 65–99)
HCT VFR BLD AUTO: 40.6 % (ref 37.5–51)
HDLC SERPL-MCNC: 37 MG/DL (ref 40–60)
HGB BLD-MCNC: 13.9 G/DL (ref 13–17.7)
LDLC SERPL CALC-MCNC: 51 MG/DL (ref 0–100)
LYMPHOCYTES # BLD AUTO: ABNORMAL 10*3/UL
LYMPHOCYTES # BLD MANUAL: 1.06 10*3/MM3 (ref 0.7–3.1)
LYMPHOCYTES NFR BLD AUTO: ABNORMAL %
LYMPHOCYTES NFR BLD MANUAL: 33 % (ref 19.6–45.3)
Lab: NORMAL
Lab: NORMAL
MCH RBC QN AUTO: 31 PG (ref 26.6–33)
MCHC RBC AUTO-ENTMCNC: 34.2 G/DL (ref 31.5–35.7)
MCV RBC AUTO: 90.4 FL (ref 79–97)
MONOCYTES # BLD MANUAL: 0.56 10*3/MM3 (ref 0.1–0.9)
MONOCYTES NFR BLD AUTO: ABNORMAL %
MONOCYTES NFR BLD MANUAL: 17.6 % (ref 5–12)
NEUTROPHILS # BLD MANUAL: 1.52 10*3/MM3 (ref 1.7–7)
NEUTROPHILS NFR BLD AUTO: ABNORMAL %
NEUTROPHILS NFR BLD MANUAL: 47.3 % (ref 42.7–76)
PLATELET # BLD AUTO: 108 10*3/MM3 (ref 140–450)
PLATELET BLD QL SMEAR: ABNORMAL
POTASSIUM SERPL-SCNC: 4.4 MMOL/L (ref 3.5–5.2)
PROT SERPL-MCNC: 6.8 G/DL (ref 6–8.5)
PSA SERPL-MCNC: 0.45 NG/ML (ref 0–4)
RBC # BLD AUTO: 4.49 10*6/MM3 (ref 4.14–5.8)
RBC MORPH BLD: ABNORMAL
SODIUM SERPL-SCNC: 141 MMOL/L (ref 136–145)
T4 FREE SERPL-MCNC: 0.85 NG/DL (ref 0.93–1.7)
TRIGL SERPL-MCNC: 102 MG/DL (ref 0–150)
TSH SERPL DL<=0.005 MIU/L-ACNC: 2.23 UIU/ML (ref 0.27–4.2)
VLDLC SERPL CALC-MCNC: 19 MG/DL (ref 5–40)
WBC # BLD AUTO: 3.21 10*3/MM3 (ref 3.4–10.8)

## 2023-01-30 ENCOUNTER — OFFICE VISIT (OUTPATIENT)
Dept: FAMILY MEDICINE CLINIC | Facility: CLINIC | Age: 63
End: 2023-01-30
Payer: COMMERCIAL

## 2023-01-30 VITALS
BODY MASS INDEX: 28.6 KG/M2 | DIASTOLIC BLOOD PRESSURE: 72 MMHG | RESPIRATION RATE: 18 BRPM | HEIGHT: 73 IN | SYSTOLIC BLOOD PRESSURE: 120 MMHG | WEIGHT: 215.8 LBS

## 2023-01-30 DIAGNOSIS — Z00.00 ROUTINE GENERAL MEDICAL EXAMINATION AT A HEALTH CARE FACILITY: Primary | ICD-10-CM

## 2023-01-30 LAB
BILIRUB BLD-MCNC: NEGATIVE MG/DL
CLARITY, POC: CLEAR
COLOR UR: YELLOW
GLUCOSE UR STRIP-MCNC: NEGATIVE MG/DL
KETONES UR QL: ABNORMAL
LEUKOCYTE EST, POC: NEGATIVE
NITRITE UR-MCNC: NEGATIVE MG/ML
PH UR: 5.5 [PH] (ref 5–8)
PROT UR STRIP-MCNC: NEGATIVE MG/DL
RBC # UR STRIP: NEGATIVE /UL
SP GR UR: 1.02 (ref 1–1.03)
THYROGLOB AB SERPL-ACNC: <1 IU/ML (ref 0–0.9)
THYROPEROXIDASE AB SERPL-ACNC: 9 IU/ML (ref 0–34)
UROBILINOGEN UR QL: NORMAL
WRITTEN AUTHORIZATION: NORMAL

## 2023-01-30 PROCEDURE — 99396 PREV VISIT EST AGE 40-64: CPT | Performed by: FAMILY MEDICINE

## 2023-01-30 PROCEDURE — 81003 URINALYSIS AUTO W/O SCOPE: CPT | Performed by: FAMILY MEDICINE

## 2023-01-30 NOTE — PROGRESS NOTES
Chief Complaint   Patient presents with   • Annual Exam     Fasting labs done last week       History:  Gregorio Callahan is a 62 y.o. male who presents today for evaluation of the above problems.      62-year-old male with history of ischemic heart disease        ROS:  Review of Systems   Respiratory: Negative for shortness of breath.    Cardiovascular: Negative for chest pain and leg swelling.   Gastrointestinal:        Colonoscopy 2022   Genitourinary: Negative for difficulty urinating.   Musculoskeletal: Positive for arthralgias and back pain.   All other systems reviewed and are negative.      No Known Allergies  Past Medical History:   Diagnosis Date   • Benign essential hypertension    • CAD in native artery    • Elevated cholesterol    • H/O acute myocardial infarction    • History of PTCA    • Hyperlipidemia, mild    • Sleep apnea    • Tobacco use disorder      Past Surgical History:   Procedure Laterality Date   • CARDIAC CATHETERIZATION     • COLONOSCOPY     • COLONOSCOPY N/A 3/21/2022    Procedure: COLONOSCOPY WITH ANESTHESIA;  Surgeon: Stanislav Clarke MD;  Location: [facility] ENDOSCOPY;  Service: Gastroenterology;  Laterality: N/A;  pre screen  post normal  Settle, Ajith Garcia MD   • CORONARY STENT PLACEMENT      x1     Family History   Problem Relation Age of Onset   • Heart disease Mother    • Alzheimer's disease Father    • Heart attack Sister    • Heart attack Brother    • No Known Problems Maternal Grandmother    • No Known Problems Maternal Grandfather    • No Known Problems Paternal Grandmother    • No Known Problems Paternal Grandfather    • Colon cancer Neg Hx    • Colon polyps Neg Hx       reports that he quit smoking about 16 years ago. His smoking use included cigarettes. He has a 90.00 pack-year smoking history. He has never used smokeless tobacco. He reports current alcohol use. He reports that he does not use drugs.      Current Outpatient Medications:   •  aspirin 81 MG EC tablet, Take 81  "mg by mouth Daily., Disp: , Rfl:   •  atorvastatin (LIPITOR) 80 MG tablet, TAKE 1 TABLET BY MOUTH EVERY DAY, Disp: 90 tablet, Rfl: 3  •  captopril (CAPOTEN) 25 MG tablet, TAKE 1 TABLET BY MOUTH TWICE A DAY, Disp: 180 tablet, Rfl: 0  •  citalopram (CeleXA) 40 MG tablet, TAKE 1 TABLET BY MOUTH EVERY DAY, Disp: 90 tablet, Rfl: 3  •  ezetimibe (ZETIA) 10 MG tablet, TAKE 1 TABLET BY MOUTH EVERY DAY, Disp: 90 tablet, Rfl: 3  •  Ginkgo Biloba 40 MG tablet, Take 40 mg by mouth Daily., Disp: , Rfl:   •  ibuprofen (ADVIL,MOTRIN) 200 MG tablet, Take 200 mg by mouth Every 6 (Six) Hours As Needed for Mild Pain ., Disp: , Rfl:   •  Misc Natural Products (OSTEO BI-FLEX TRIPLE STRENGTH PO), Take  by mouth 2 (Two) Times a Day., Disp: , Rfl:   •  nitroglycerin (NITROSTAT) 0.4 MG SL tablet, TAKE 1 TABLET BY MOUTH EVERY 5 MINUTES UP TO 3 TIMES AS NEEDED FOR CHEST PAIN., Disp: 25 tablet, Rfl: 2  •  Potassium 99 MG tablet, Take 99 mg by mouth Daily., Disp: , Rfl:   •  traZODone (DESYREL) 50 MG tablet, TAKE 1 OR 2 TABLETS BY MOUTH AT BEDTIME AS NEEDED, Disp: 180 tablet, Rfl: 3  •  VIAGRA 100 MG tablet, TAKE AS DIRECTED, Disp: 6 tablet, Rfl: 3  •  vitamin B-12 (CYANOCOBALAMIN) 1000 MCG tablet, Take 1,000 mcg by mouth Daily., Disp: , Rfl:     OBJECTIVE:  /72 (BP Location: Left arm, Patient Position: Sitting, Cuff Size: Large Adult)   Resp 18   Ht 185.4 cm (73\")   Wt 97.9 kg (215 lb 12.8 oz)   BMI 28.47 kg/m²    Physical Exam  Vitals and nursing note reviewed.   Constitutional:       Appearance: Normal appearance.   HENT:      Right Ear: Tympanic membrane and ear canal normal.      Left Ear: Tympanic membrane and ear canal normal.   Eyes:      Extraocular Movements: Extraocular movements intact.      Pupils: Pupils are equal, round, and reactive to light.   Neck:      Vascular: No carotid bruit.   Cardiovascular:      Rate and Rhythm: Normal rate and regular rhythm.      Pulses: Normal pulses.      Heart sounds: Normal heart " sounds.   Pulmonary:      Effort: Pulmonary effort is normal.      Breath sounds: Normal breath sounds.   Abdominal:      General: Abdomen is flat.      Palpations: Abdomen is soft. There is no mass.   Genitourinary:     Penis: Normal.       Testes: Normal.      Prostate: Normal.      Comments: No testicular masses inguinal hernia or adenopathy-prostate 1+ no nodules  Musculoskeletal:      Right lower leg: No edema.      Left lower leg: No edema.   Lymphadenopathy:      Cervical: No cervical adenopathy.   Skin:     General: Skin is warm and dry.   Neurological:      General: No focal deficit present.      Mental Status: He is alert and oriented to person, place, and time.   Psychiatric:         Mood and Affect: Mood normal.         Behavior: Behavior normal.         Thought Content: Thought content normal.         Judgment: Judgment normal.         BMI is >= 25 and <30. (Overweight) The following options were offered after discussion;: exercise counseling/recommendations      Health Maintenance:  Immunization History   Administered Date(s) Administered   • COVID-19 (MODERNA) 1st, 2nd, 3rd Dose Only 03/16/2021, 04/13/2021, 11/11/2021   • COVID-19 (PFIZER) BIVALENT BOOSTER 12+YRS 10/25/2022   • Flu Vaccine Intradermal Quad 18-64YR 10/18/2017   • Flu Vaccine Quad PF >36MO 11/12/2016   • FluLaval/Fluzone >6mos 10/25/2021, 10/25/2022   • Fluad Quad 65+ 08/28/2020   • Pneumococcal Conjugate 13-Valent (PCV13) 08/10/2016   • Pneumococcal Polysaccharide (PPSV23) 10/23/2018   • Shingrix 11/18/2019, 07/16/2020   • Tdap 10/18/2017   • flucelvax quad pfs =>4 YRS 10/23/2018, 11/12/2019        Up-to-date    Assessment/Plan    Diagnoses and all orders for this visit:    1. Routine general medical examination at a health care facility (Primary)  -     POC Urinalysis Dipstick, Multipro        Plan above-up-to-date on immunizations-encouraged exercise Mediterranean diet  An After Visit Summary was printed and given to the patient at  discharge.  Return in 6 months (on 7/30/2023), or if symptoms worsen or fail to improve.         Ajith Barrientos MD 1/30/2023   Electronically signed.

## 2023-02-17 RX ORDER — CITALOPRAM 40 MG/1
TABLET ORAL
Qty: 90 TABLET | Refills: 3 | Status: SHIPPED | OUTPATIENT
Start: 2023-02-17

## 2023-02-17 NOTE — TELEPHONE ENCOUNTER
Rx Refill Note  Requested Prescriptions     Pending Prescriptions Disp Refills   • citalopram (CeleXA) 40 MG tablet [Pharmacy Med Name: CITALOPRAM HBR 40 MG TABLET] 90 tablet 3     Sig: TAKE 1 TABLET BY MOUTH EVERY DAY      Last office visit with prescribing clinician: 1/30/2023   Last telemedicine visit with prescribing clinician: 8/1/2023   Next office visit with prescribing clinician: 8/1/2023                         Would you like a call back once the refill request has been completed: [] Yes [] No    If the office needs to give you a call back, can they leave a voicemail: [] Yes [] No    Kat Mathew MA  02/17/23, 07:32 CST

## 2023-03-03 RX ORDER — CAPTOPRIL 25 MG/1
TABLET ORAL
Qty: 180 TABLET | Refills: 3 | Status: SHIPPED | OUTPATIENT
Start: 2023-03-03

## 2023-03-03 NOTE — TELEPHONE ENCOUNTER
Rx Refill Note  Requested Prescriptions     Pending Prescriptions Disp Refills   • captopril (CAPOTEN) 25 MG tablet [Pharmacy Med Name: CAPTOPRIL 25 MG TABLET] 180 tablet 0     Sig: TAKE 1 TABLET BY MOUTH TWICE A DAY      Last office visit with prescribing clinician: 1/30/2023   Last telemedicine visit with prescribing clinician: 8/1/2023   Next office visit with prescribing clinician: 8/1/2023       {TIP  Please add Last Relevant Lab 1/26/23   Kat Mathew MA  03/03/23, 07:41 CST

## 2023-04-07 DIAGNOSIS — E78.00 PURE HYPERCHOLESTEROLEMIA: ICD-10-CM

## 2023-04-09 RX ORDER — ATORVASTATIN CALCIUM 80 MG/1
TABLET, FILM COATED ORAL
Qty: 90 TABLET | Refills: 3 | Status: SHIPPED | OUTPATIENT
Start: 2023-04-09

## 2023-04-09 RX ORDER — TRAZODONE HYDROCHLORIDE 50 MG/1
TABLET ORAL
Qty: 180 TABLET | Refills: 3 | Status: SHIPPED | OUTPATIENT
Start: 2023-04-09

## 2023-08-01 ENCOUNTER — OFFICE VISIT (OUTPATIENT)
Dept: FAMILY MEDICINE CLINIC | Facility: CLINIC | Age: 63
End: 2023-08-01
Payer: COMMERCIAL

## 2023-08-01 VITALS
TEMPERATURE: 97 F | BODY MASS INDEX: 29.77 KG/M2 | WEIGHT: 224.6 LBS | HEART RATE: 65 BPM | DIASTOLIC BLOOD PRESSURE: 75 MMHG | HEIGHT: 73 IN | SYSTOLIC BLOOD PRESSURE: 117 MMHG | OXYGEN SATURATION: 97 %

## 2023-08-01 DIAGNOSIS — J01.00 SUBACUTE MAXILLARY SINUSITIS: ICD-10-CM

## 2023-08-01 DIAGNOSIS — M60.9 MYOSITIS, UNSPECIFIED MYOSITIS TYPE, UNSPECIFIED SITE: ICD-10-CM

## 2023-08-01 DIAGNOSIS — M65.9 SYNOVITIS: ICD-10-CM

## 2023-08-01 DIAGNOSIS — R53.83 FATIGUE, UNSPECIFIED TYPE: Primary | ICD-10-CM

## 2023-08-01 DIAGNOSIS — E03.9 HYPOTHYROIDISM, UNSPECIFIED TYPE: ICD-10-CM

## 2023-08-01 DIAGNOSIS — E78.2 MIXED HYPERLIPIDEMIA: ICD-10-CM

## 2023-08-01 RX ORDER — TRIAMCINOLONE ACETONIDE 40 MG/ML
40 INJECTION, SUSPENSION INTRA-ARTICULAR; INTRAMUSCULAR ONCE
Status: COMPLETED | OUTPATIENT
Start: 2023-08-01 | End: 2023-08-01

## 2023-08-01 RX ORDER — AZITHROMYCIN 250 MG/1
TABLET, FILM COATED ORAL
Qty: 6 TABLET | Refills: 0 | Status: SHIPPED | OUTPATIENT
Start: 2023-08-01

## 2023-08-01 RX ADMIN — TRIAMCINOLONE ACETONIDE 40 MG: 40 INJECTION, SUSPENSION INTRA-ARTICULAR; INTRAMUSCULAR at 08:47

## 2023-08-02 LAB
ALBUMIN SERPL-MCNC: 4.4 G/DL (ref 3.5–5.2)
ALBUMIN/GLOB SERPL: 2 G/DL
ALP SERPL-CCNC: 95 U/L (ref 39–117)
ALT SERPL-CCNC: 22 U/L (ref 1–41)
AST SERPL-CCNC: 27 U/L (ref 1–40)
BILIRUB SERPL-MCNC: 0.5 MG/DL (ref 0–1.2)
BUN SERPL-MCNC: 14 MG/DL (ref 8–23)
BUN/CREAT SERPL: 15.4 (ref 7–25)
CALCIUM SERPL-MCNC: 9.5 MG/DL (ref 8.6–10.5)
CHLORIDE SERPL-SCNC: 108 MMOL/L (ref 98–107)
CHOLEST SERPL-MCNC: 106 MG/DL (ref 0–200)
CO2 SERPL-SCNC: 27.9 MMOL/L (ref 22–29)
CREAT SERPL-MCNC: 0.91 MG/DL (ref 0.76–1.27)
DIFFERENTIAL COMMENT: ABNORMAL
EGFRCR SERPLBLD CKD-EPI 2021: 95.3 ML/MIN/1.73
EOSINOPHIL # BLD MANUAL: 0.07 10*3/MM3 (ref 0–0.4)
EOSINOPHIL NFR BLD MANUAL: 2 % (ref 0.3–6.2)
ERYTHROCYTE [DISTWIDTH] IN BLOOD BY AUTOMATED COUNT: 13.4 % (ref 12.3–15.4)
GLOBULIN SER CALC-MCNC: 2.2 GM/DL
GLUCOSE SERPL-MCNC: 96 MG/DL (ref 65–99)
HCT VFR BLD AUTO: 39.7 % (ref 37.5–51)
HDLC SERPL-MCNC: 34 MG/DL (ref 40–60)
HGB BLD-MCNC: 13.2 G/DL (ref 13–17.7)
LDLC SERPL CALC-MCNC: 52 MG/DL (ref 0–100)
LYMPHOCYTES # BLD MANUAL: 1.28 10*3/MM3 (ref 0.7–3.1)
LYMPHOCYTES NFR BLD MANUAL: 36.7 % (ref 19.6–45.3)
MCH RBC QN AUTO: 31.3 PG (ref 26.6–33)
MCHC RBC AUTO-ENTMCNC: 33.2 G/DL (ref 31.5–35.7)
MCV RBC AUTO: 94.1 FL (ref 79–97)
MONOCYTES # BLD MANUAL: 0.64 10*3/MM3 (ref 0.1–0.9)
MONOCYTES NFR BLD MANUAL: 18.4 % (ref 5–12)
NEUTROPHILS # BLD MANUAL: 1.49 10*3/MM3 (ref 1.7–7)
NEUTROPHILS NFR BLD MANUAL: 42.9 % (ref 42.7–76)
PLATELET # BLD AUTO: 103 10*3/MM3 (ref 140–450)
PLATELET BLD QL SMEAR: ABNORMAL
POTASSIUM SERPL-SCNC: 5.1 MMOL/L (ref 3.5–5.2)
PROT SERPL-MCNC: 6.6 G/DL (ref 6–8.5)
RBC # BLD AUTO: 4.22 10*6/MM3 (ref 4.14–5.8)
RBC MORPH BLD: ABNORMAL
SODIUM SERPL-SCNC: 146 MMOL/L (ref 136–145)
TRIGL SERPL-MCNC: 106 MG/DL (ref 0–150)
TSH SERPL DL<=0.005 MIU/L-ACNC: 2.7 UIU/ML (ref 0.27–4.2)
VLDLC SERPL CALC-MCNC: 20 MG/DL (ref 5–40)
WBC # BLD AUTO: 3.48 10*3/MM3 (ref 3.4–10.8)

## 2023-10-11 ENCOUNTER — FLU SHOT (OUTPATIENT)
Dept: FAMILY MEDICINE CLINIC | Facility: CLINIC | Age: 63
End: 2023-10-11
Payer: COMMERCIAL

## 2023-10-11 DIAGNOSIS — Z23 NEED FOR INFLUENZA VACCINATION: Primary | ICD-10-CM

## 2023-10-19 ENCOUNTER — IMMUNIZATION (OUTPATIENT)
Dept: FAMILY MEDICINE CLINIC | Facility: CLINIC | Age: 63
End: 2023-10-19
Payer: COMMERCIAL

## 2023-10-19 DIAGNOSIS — Z23 NEED FOR COVID-19 VACCINE: Primary | ICD-10-CM

## 2023-11-15 ENCOUNTER — OFFICE VISIT (OUTPATIENT)
Dept: CARDIOLOGY | Facility: CLINIC | Age: 63
End: 2023-11-15
Payer: COMMERCIAL

## 2023-11-15 VITALS
DIASTOLIC BLOOD PRESSURE: 60 MMHG | BODY MASS INDEX: 30.09 KG/M2 | WEIGHT: 227 LBS | SYSTOLIC BLOOD PRESSURE: 100 MMHG | HEART RATE: 74 BPM | HEIGHT: 73 IN

## 2023-11-15 DIAGNOSIS — I10 BENIGN ESSENTIAL HYPERTENSION: ICD-10-CM

## 2023-11-15 DIAGNOSIS — R06.09 DOE (DYSPNEA ON EXERTION): ICD-10-CM

## 2023-11-15 DIAGNOSIS — E78.2 MIXED HYPERLIPIDEMIA: ICD-10-CM

## 2023-11-15 DIAGNOSIS — I25.10 CAD IN NATIVE ARTERY: Primary | ICD-10-CM

## 2023-11-15 DIAGNOSIS — E78.5 HYPERLIPIDEMIA, MILD: ICD-10-CM

## 2023-11-15 RX ORDER — LISINOPRIL 5 MG/1
5 TABLET ORAL DAILY
Qty: 30 TABLET | Refills: 11 | Status: SHIPPED | OUTPATIENT
Start: 2023-11-15

## 2023-11-15 RX ORDER — EZETIMIBE 10 MG/1
10 TABLET ORAL DAILY
Qty: 90 TABLET | Refills: 3 | Status: SHIPPED | OUTPATIENT
Start: 2023-11-15 | End: 2024-11-09

## 2023-11-15 NOTE — PROGRESS NOTES
Subjective    Gregorio Callahan is a 63 y.o. male. Fu of ihd and risks    History of Present Illness     IHD:  Is still very active and working at the same job. Has noted a slight decline in his stamina over the past 2years with more SZYMANSKI. Has no cp or unusual soa. No symptoms like before MI. Meds are stable. EKG today is nml and nsc.     HTN:  Does not check at home bu gets good clinic checks. No light-headedness.    HLD:  On a potent statin + Zetia and LDL=52         The following portions of the patient's history were reviewed and updated as appropriate: allergies, current medications, past family history, past medical history, past social history, past surgical history and problem list.    Patient Active Problem List   Diagnosis    Hyperlipidemia, mild    Benign essential hypertension    CAD in native artery    H/O acute myocardial infarction    History of PTCA    SZYMANSKI (dyspnea on exertion)    Encounter for screening for malignant neoplasm of colon       No Known Allergies    Family History   Problem Relation Age of Onset    Heart disease Mother     Alzheimer's disease Father     Heart attack Sister     Heart attack Brother     No Known Problems Maternal Grandmother     No Known Problems Maternal Grandfather     No Known Problems Paternal Grandmother     No Known Problems Paternal Grandfather     Colon cancer Neg Hx     Colon polyps Neg Hx        Social History     Socioeconomic History    Marital status:    Tobacco Use    Smoking status: Former     Packs/day: 3.00     Years: 30.00     Additional pack years: 0.00     Total pack years: 90.00     Types: Cigarettes     Quit date: 2007     Years since quittin.8    Smokeless tobacco: Never    Tobacco comments:     age quit smokin   Vaping Use    Vaping Use: Never used   Substance and Sexual Activity    Alcohol use: Yes     Comment: Occasional alcohol use. Drinks beer.    Drug use: No    Sexual activity: Defer         Current Outpatient Medications:      aspirin 81 MG EC tablet, Take 1 tablet by mouth Daily., Disp: , Rfl:     atorvastatin (LIPITOR) 80 MG tablet, TAKE 1 TABLET BY MOUTH EVERY DAY, Disp: 90 tablet, Rfl: 3    citalopram (CeleXA) 40 MG tablet, TAKE 1 TABLET BY MOUTH EVERY DAY, Disp: 90 tablet, Rfl: 3    ezetimibe (ZETIA) 10 MG tablet, Take 1 tablet by mouth Daily for 360 days., Disp: 90 tablet, Rfl: 3    Ginkgo Biloba 40 MG tablet, Take 40 mg by mouth Daily., Disp: , Rfl:     ibuprofen (ADVIL,MOTRIN) 200 MG tablet, Take 1 tablet by mouth Every 6 (Six) Hours As Needed for Mild Pain., Disp: , Rfl:     Misc Natural Products (OSTEO BI-FLEX TRIPLE STRENGTH PO), Take  by mouth 2 (Two) Times a Day., Disp: , Rfl:     nitroglycerin (NITROSTAT) 0.4 MG SL tablet, TAKE 1 TABLET BY MOUTH EVERY 5 MINUTES UP TO 3 TIMES AS NEEDED FOR CHEST PAIN., Disp: 25 tablet, Rfl: 2    Potassium 99 MG tablet, Take 99 mg by mouth Daily., Disp: , Rfl:     traZODone (DESYREL) 50 MG tablet, TAKE 1 OR 2 TABLETS BY MOUTH AT BEDTIME AS NEEDED, Disp: 180 tablet, Rfl: 3    VIAGRA 100 MG tablet, TAKE AS DIRECTED, Disp: 6 tablet, Rfl: 3    vitamin B-12 (CYANOCOBALAMIN) 1000 MCG tablet, Take 1 tablet by mouth Daily., Disp: , Rfl:     lisinopril (PRINIVIL,ZESTRIL) 5 MG tablet, Take 1 tablet by mouth Daily., Disp: 30 tablet, Rfl: 11    Past Surgical History:   Procedure Laterality Date    CARDIAC CATHETERIZATION      COLONOSCOPY      COLONOSCOPY N/A 3/21/2022    Procedure: COLONOSCOPY WITH ANESTHESIA;  Surgeon: Stanislav Clarke MD;  Location: [facility] ENDOSCOPY;  Service: Gastroenterology;  Laterality: N/A;  pre screen  post normal  Settle, Ajith Garcia MD    CORONARY STENT PLACEMENT      x1       Review of Systems   Constitutional:  Positive for fatigue. Negative for activity change, appetite change and unexpected weight change.   Respiratory:  Positive for shortness of breath. Negative for wheezing.    Cardiovascular:  Negative for chest pain, palpitations and leg swelling.  "  Gastrointestinal:  Negative for abdominal pain and blood in stool.   Genitourinary:  Negative for difficulty urinating and hematuria.   Musculoskeletal:  Positive for back pain and neck pain.   Psychiatric/Behavioral:  Negative for sleep disturbance.        /60   Pulse 74   Ht 185.4 cm (73\")   Wt 103 kg (227 lb)   BMI 29.95 kg/m²   Procedures    Objective   Physical Exam  Constitutional:       Appearance: Normal appearance.   Cardiovascular:      Rate and Rhythm: Normal rate and regular rhythm.      Pulses: Normal pulses.      Heart sounds: Normal heart sounds.   Pulmonary:      Effort: Pulmonary effort is normal.      Breath sounds: Normal breath sounds. No wheezing or rales.   Abdominal:      General: Bowel sounds are normal.      Tenderness: There is no abdominal tenderness.   Musculoskeletal:      Right lower leg: No edema.      Left lower leg: No edema.   Skin:     General: Skin is warm.   Neurological:      General: No focal deficit present.   Psychiatric:         Mood and Affect: Mood normal.         Assessment & Plan   Diagnoses and all orders for this visit:    1. CAD in native artery (Primary)  -     ECG 12 Lead    2. Hyperlipidemia, mild    3. Benign essential hypertension  -     lisinopril (PRINIVIL,ZESTRIL) 5 MG tablet; Take 1 tablet by mouth Daily.  Dispense: 30 tablet; Refill: 11    4. Mixed hyperlipidemia  Comments:  TGL and LDL not to goal - add Zetia and Vascepa  Orders:  -     ezetimibe (ZETIA) 10 MG tablet; Take 1 tablet by mouth Daily for 360 days.  Dispense: 90 tablet; Refill: 3      Mdm=mod - chronic conditions + script med management           Return in about 1 year (around 11/15/2024) for Next scheduled follow up with Dr Chew.  Orders Placed This Encounter   Procedures    ECG 12 Lead     Order Specific Question:   Reason for Exam:     Answer:   [email or web address]     Order Specific Question:   Release to patient     Answer:   Routine Release [[id number]]     "

## 2023-11-30 NOTE — PROGRESS NOTES
22w3d pt c/o thigh/groin tightness/soreness. Pt has done stretching and massaging but it will return with exercise. Pt asking if referral for pelvic floor therapist would be considered. [facility] and [facility]. Thank you. What about the left knee?

## 2024-02-01 ENCOUNTER — OFFICE VISIT (OUTPATIENT)
Dept: FAMILY MEDICINE CLINIC | Facility: CLINIC | Age: 64
End: 2024-02-01
Payer: COMMERCIAL

## 2024-02-01 ENCOUNTER — PATIENT ROUNDING (BHMG ONLY) (OUTPATIENT)
Dept: FAMILY MEDICINE CLINIC | Facility: CLINIC | Age: 64
End: 2024-02-01

## 2024-02-01 VITALS
HEIGHT: 73 IN | TEMPERATURE: 97.6 F | BODY MASS INDEX: 30.43 KG/M2 | OXYGEN SATURATION: 94 % | SYSTOLIC BLOOD PRESSURE: 124 MMHG | DIASTOLIC BLOOD PRESSURE: 68 MMHG | HEART RATE: 72 BPM | WEIGHT: 229.6 LBS | RESPIRATION RATE: 18 BRPM

## 2024-02-01 DIAGNOSIS — G89.29 CHRONIC BACK PAIN, UNSPECIFIED BACK LOCATION, UNSPECIFIED BACK PAIN LATERALITY: ICD-10-CM

## 2024-02-01 DIAGNOSIS — Z00.00 ROUTINE GENERAL MEDICAL EXAMINATION AT A HEALTH CARE FACILITY: Primary | ICD-10-CM

## 2024-02-01 DIAGNOSIS — M54.9 CHRONIC BACK PAIN, UNSPECIFIED BACK LOCATION, UNSPECIFIED BACK PAIN LATERALITY: ICD-10-CM

## 2024-02-01 DIAGNOSIS — Z12.5 SPECIAL SCREENING FOR MALIGNANT NEOPLASM OF PROSTATE: ICD-10-CM

## 2024-02-01 LAB
BILIRUB BLD-MCNC: NEGATIVE MG/DL
CLARITY, POC: CLEAR
COLOR UR: YELLOW
GLUCOSE UR STRIP-MCNC: NEGATIVE MG/DL
KETONES UR QL: NEGATIVE
LEUKOCYTE EST, POC: NEGATIVE
NITRITE UR-MCNC: NEGATIVE MG/ML
PH UR: 6.5 [PH] (ref 5–8)
PROT UR STRIP-MCNC: NEGATIVE MG/DL
RBC # UR STRIP: NEGATIVE /UL
SP GR UR: 1.02 (ref 1–1.03)
UROBILINOGEN UR QL: NORMAL

## 2024-02-01 NOTE — PROGRESS NOTES
February 1, 2024    Hello, may I speak with Gregorio Callahan?    My name is Olya      I am  with [facility]  [address], [address]  [place] [address] [phone].    Before we get started may I verify your date of birth? 1960    I am calling to officially welcome you to our practice and ask about your recent visit. Is this a good time to talk? Yes    Tell me about your visit with us. What things went well?  Everything went Well!       We're always looking for ways to make our patients' experiences even better. Do you have recommendations on ways we may improve?  none    Overall were you satisfied with your first visit to our practice? yes       I appreciate you taking the time to speak with me today. Is there anything else I can do for you? no      Thank you, and have a great day.

## 2024-02-01 NOTE — PROGRESS NOTES
Chief Complaint   Patient presents with    Annual Exam     Fasting       History:  Gregorio Callahan is a 63 y.o. male who presents today for evaluation of the above problems.      63-year-old male for his annual physical          ROS:  Review of Systems   Respiratory:  Negative for shortness of breath.    Cardiovascular:  Negative for chest pain and leg swelling.        Sees cardiologist yearly   Gastrointestinal:         Colonoscopy up-to-date   Genitourinary:  Negative for difficulty urinating.   Musculoskeletal:  Negative for arthralgias.   All other systems reviewed and are negative.      No Known Allergies  Past Medical History:   Diagnosis Date    Benign essential hypertension     CAD in native artery     Elevated cholesterol     H/O acute myocardial infarction     History of PTCA     Hyperlipidemia, mild     Sleep apnea     Tobacco use disorder      Past Surgical History:   Procedure Laterality Date    CARDIAC CATHETERIZATION      COLONOSCOPY      COLONOSCOPY N/A 3/21/2022    Procedure: COLONOSCOPY WITH ANESTHESIA;  Surgeon: Stanislav Clarke MD;  Location: [facility] ENDOSCOPY;  Service: Gastroenterology;  Laterality: N/A;  pre screen  post normal  Iliana, Ajith Garcia MD    CORONARY STENT PLACEMENT      x1     Family History   Problem Relation Age of Onset    Heart disease Mother     Alzheimer's disease Father     Heart attack Sister     Heart attack Brother     No Known Problems Maternal Grandmother     No Known Problems Maternal Grandfather     No Known Problems Paternal Grandmother     No Known Problems Paternal Grandfather     Colon cancer Neg Hx     Colon polyps Neg Hx       reports that he quit smoking about 17 years ago. His smoking use included cigarettes. He has a 90.00 pack-year smoking history. He has never used smokeless tobacco. He reports current alcohol use. He reports that he does not use drugs.      Current Outpatient Medications:     aspirin 81 MG EC tablet, Take 1 tablet by mouth Daily.,  "Disp: , Rfl:     atorvastatin (LIPITOR) 80 MG tablet, TAKE 1 TABLET BY MOUTH EVERY DAY, Disp: 90 tablet, Rfl: 3    citalopram (CeleXA) 40 MG tablet, TAKE 1 TABLET BY MOUTH EVERY DAY, Disp: 90 tablet, Rfl: 3    ezetimibe (ZETIA) 10 MG tablet, Take 1 tablet by mouth Daily for 360 days., Disp: 90 tablet, Rfl: 3    Ginkgo Biloba 40 MG tablet, Take 40 mg by mouth Daily., Disp: , Rfl:     ibuprofen (ADVIL,MOTRIN) 200 MG tablet, Take 1 tablet by mouth Every 6 (Six) Hours As Needed for Mild Pain., Disp: , Rfl:     lisinopril (PRINIVIL,ZESTRIL) 5 MG tablet, Take 1 tablet by mouth Daily., Disp: 30 tablet, Rfl: 11    Misc Natural Products (OSTEO BI-FLEX TRIPLE STRENGTH PO), Take  by mouth 2 (Two) Times a Day., Disp: , Rfl:     nitroglycerin (NITROSTAT) 0.4 MG SL tablet, TAKE 1 TABLET BY MOUTH EVERY 5 MINUTES UP TO 3 TIMES AS NEEDED FOR CHEST PAIN., Disp: 25 tablet, Rfl: 2    Potassium 99 MG tablet, Take 99 mg by mouth Daily., Disp: , Rfl:     traZODone (DESYREL) 50 MG tablet, TAKE 1 OR 2 TABLETS BY MOUTH AT BEDTIME AS NEEDED, Disp: 180 tablet, Rfl: 3    VIAGRA 100 MG tablet, TAKE AS DIRECTED, Disp: 6 tablet, Rfl: 3    vitamin B-12 (CYANOCOBALAMIN) 1000 MCG tablet, Take 1 tablet by mouth Daily., Disp: , Rfl:     OBJECTIVE:  /68 (BP Location: Left arm, Patient Position: Sitting, Cuff Size: Large Adult)   Pulse 72   Temp 97.6 °F (36.4 °C) (Temporal)   Resp 18   Ht 185.4 cm (73\")   Wt 104 kg (229 lb 9.6 oz)   SpO2 94%   BMI 30.29 kg/m²    Physical Exam  Vitals and nursing note reviewed.   Constitutional:       Appearance: Normal appearance. He is obese.   HENT:      Right Ear: Tympanic membrane and ear canal normal.      Left Ear: Tympanic membrane and ear canal normal.      Mouth/Throat:      Mouth: Mucous membranes are moist.   Eyes:      Extraocular Movements: Extraocular movements intact.      Pupils: Pupils are equal, round, and reactive to light.   Neck:      Vascular: No carotid bruit.   Cardiovascular:      " Rate and Rhythm: Normal rate and regular rhythm.      Pulses: Normal pulses.      Heart sounds: Normal heart sounds.   Pulmonary:      Effort: Pulmonary effort is normal.      Breath sounds: Normal breath sounds.   Abdominal:      General: Abdomen is flat.      Palpations: Abdomen is soft. There is no mass.   Genitourinary:     Penis: Normal.       Testes: Normal.      Prostate: Normal.      Rectum: Normal.      Comments: No testicular masses inguinal hernia or adenopathy-prostate 2+ no nodules  Musculoskeletal:      Right lower leg: No edema.      Left lower leg: No edema.   Lymphadenopathy:      Cervical: No cervical adenopathy.   Skin:     General: Skin is warm and dry.      Comments: Seborrheic keratosis left temple   Neurological:      General: No focal deficit present.      Mental Status: He is alert and oriented to person, place, and time.   Psychiatric:         Mood and Affect: Mood normal.         Behavior: Behavior normal.         Thought Content: Thought content normal.         Judgment: Judgment normal.         BMI is >= 25 and <30. (Overweight) The following options were offered after discussion;: exercise counseling/recommendations and nutrition counseling/recommendations       Health Maintenance:  Immunization History   Administered Date(s) Administered    COVID-19 (MODERNA) 1st,2nd,3rd Dose Monovalent 03/16/2021, 04/13/2021, 11/11/2021    COVID-19 (PFIZER) BIVALENT 12+YRS 10/25/2022    COVID-19 F23 (PFIZER) 12YRS+ (COMIRNATY) 10/19/2023    Flu Vaccine Intradermal Quad 18-64YR 10/18/2017    Flu Vaccine Quad PF >36MO 11/12/2016    Fluad Quad 65+ 08/28/2020    Fluzone (or Fluarix & Flulaval for VFC) >6mos 10/25/2021, 10/25/2022, 10/11/2023    Pneumococcal Conjugate 13-Valent (PCV13) 08/10/2016    Pneumococcal Polysaccharide (PPSV23) 10/23/2018    Shingrix 11/18/2019, 07/16/2020    Tdap 10/18/2017    flucelvax quad pfs =>4 YRS 10/23/2018, 11/12/2019        Up-to-date after  today    Assessment/Plan    Diagnoses and all orders for this visit:    1. Routine general medical examination at a health care facility (Primary)  -     TSH  -     T4, free  -     CBC & Differential  -     Comprehensive Metabolic Panel  -     Lipid Panel    2. Special screening for malignant neoplasm of prostate  -     PSA Screen    3. Chronic back pain, unspecified back location, unspecified back pain laterality  -     POC Urinalysis Dipstick, Multipro      Plan above-up-to-date on immunization  BMI is >= 30 and <35. (Class 1 Obesity). The following options were offered after discussion;: exercise counseling/recommendations and nutrition counseling/recommendations   An After Visit Summary was printed and given to the patient at discharge.  Return in 6 months (on 8/1/2024).         Ajith Barrientos MD 2/1/2024   Electronically signed.

## 2024-02-02 DIAGNOSIS — D72.819 LEUKOPENIA, UNSPECIFIED TYPE: Primary | ICD-10-CM

## 2024-02-02 LAB
ALBUMIN SERPL-MCNC: 4.9 G/DL (ref 3.5–5.2)
ALBUMIN/GLOB SERPL: 2.3 G/DL
ALP SERPL-CCNC: 91 U/L (ref 39–117)
ALT SERPL-CCNC: 33 U/L (ref 1–41)
AST SERPL-CCNC: 38 U/L (ref 1–40)
BASOPHILS # BLD AUTO: ABNORMAL 10*3/UL
BILIRUB SERPL-MCNC: 0.9 MG/DL (ref 0–1.2)
BUN SERPL-MCNC: 16 MG/DL (ref 8–23)
BUN/CREAT SERPL: 15.8 (ref 7–25)
CALCIUM SERPL-MCNC: 9.3 MG/DL (ref 8.6–10.5)
CHLORIDE SERPL-SCNC: 104 MMOL/L (ref 98–107)
CHOLEST SERPL-MCNC: 103 MG/DL (ref 0–200)
CO2 SERPL-SCNC: 27.2 MMOL/L (ref 22–29)
CREAT SERPL-MCNC: 1.01 MG/DL (ref 0.76–1.27)
DIFFERENTIAL COMMENT: ABNORMAL
EGFRCR SERPLBLD CKD-EPI 2021: 84.1 ML/MIN/1.73
EOSINOPHIL # BLD AUTO: ABNORMAL 10*3/UL
EOSINOPHIL # BLD MANUAL: 0.03 10*3/MM3 (ref 0–0.4)
EOSINOPHIL NFR BLD AUTO: ABNORMAL %
EOSINOPHIL NFR BLD MANUAL: 1.1 % (ref 0.3–6.2)
ERYTHROCYTE [DISTWIDTH] IN BLOOD BY AUTOMATED COUNT: 14.4 % (ref 12.3–15.4)
GLOBULIN SER CALC-MCNC: 2.1 GM/DL
GLUCOSE SERPL-MCNC: 88 MG/DL (ref 65–99)
HCT VFR BLD AUTO: 39.5 % (ref 37.5–51)
HDLC SERPL-MCNC: 35 MG/DL (ref 40–60)
HGB BLD-MCNC: 13.2 G/DL (ref 13–17.7)
LDLC SERPL CALC-MCNC: 50 MG/DL (ref 0–100)
LYMPHOCYTES # BLD AUTO: ABNORMAL 10*3/UL
LYMPHOCYTES # BLD MANUAL: 1.11 10*3/MM3 (ref 0.7–3.1)
LYMPHOCYTES NFR BLD AUTO: ABNORMAL %
LYMPHOCYTES NFR BLD MANUAL: 42.7 % (ref 19.6–45.3)
MCH RBC QN AUTO: 31.7 PG (ref 26.6–33)
MCHC RBC AUTO-ENTMCNC: 33.4 G/DL (ref 31.5–35.7)
MCV RBC AUTO: 95 FL (ref 79–97)
MONOCYTES # BLD MANUAL: 0.96 10*3/MM3 (ref 0.1–0.9)
MONOCYTES NFR BLD AUTO: ABNORMAL %
MONOCYTES NFR BLD MANUAL: 37.1 % (ref 5–12)
NEUTROPHILS # BLD MANUAL: 0.49 10*3/MM3 (ref 1.7–7)
NEUTROPHILS NFR BLD AUTO: ABNORMAL %
NEUTROPHILS NFR BLD MANUAL: 19.1 % (ref 42.7–76)
PLATELET # BLD AUTO: 94 10*3/MM3 (ref 140–450)
PLATELET BLD QL SMEAR: ABNORMAL
POTASSIUM SERPL-SCNC: 4.5 MMOL/L (ref 3.5–5.2)
PROT SERPL-MCNC: 7 G/DL (ref 6–8.5)
PSA SERPL-MCNC: 0.66 NG/ML (ref 0–4)
RBC # BLD AUTO: 4.16 10*6/MM3 (ref 4.14–5.8)
RBC MORPH BLD: ABNORMAL
SODIUM SERPL-SCNC: 138 MMOL/L (ref 136–145)
T4 FREE SERPL-MCNC: 0.97 NG/DL (ref 0.93–1.7)
TRIGL SERPL-MCNC: 94 MG/DL (ref 0–150)
TSH SERPL DL<=0.005 MIU/L-ACNC: 2.18 UIU/ML (ref 0.27–4.2)
VLDLC SERPL CALC-MCNC: 18 MG/DL (ref 5–40)
WBC # BLD AUTO: 2.59 10*3/MM3 (ref 3.4–10.8)

## 2024-02-09 RX ORDER — CITALOPRAM 40 MG/1
TABLET ORAL
Qty: 90 TABLET | Refills: 3 | Status: SHIPPED | OUTPATIENT
Start: 2024-02-09

## 2024-02-09 NOTE — TELEPHONE ENCOUNTER
Rx Refill Note  Requested Prescriptions     Pending Prescriptions Disp Refills    citalopram (CeleXA) 40 MG tablet [Pharmacy Med Name: CITALOPRAM HBR 40 MG TABLET] 90 tablet 3     Sig: TAKE 1 TABLET BY MOUTH EVERY DAY      Last office visit with prescribing clinician: 2/1/2024   Last telemedicine visit with prescribing clinician: Visit date not found   Next office visit with prescribing clinician: 8/6/2024     Kat Mathew MA  02/09/24, 07:28 CST

## 2024-02-14 DIAGNOSIS — E78.00 PURE HYPERCHOLESTEROLEMIA: ICD-10-CM

## 2024-02-14 DIAGNOSIS — I10 BENIGN ESSENTIAL HYPERTENSION: ICD-10-CM

## 2024-02-14 RX ORDER — LISINOPRIL 5 MG/1
5 TABLET ORAL DAILY
Qty: 90 TABLET | Refills: 3 | Status: SHIPPED | OUTPATIENT
Start: 2024-02-14

## 2024-02-14 RX ORDER — ATORVASTATIN CALCIUM 80 MG/1
80 TABLET, FILM COATED ORAL DAILY
Qty: 90 TABLET | Refills: 3 | Status: SHIPPED | OUTPATIENT
Start: 2024-02-14

## 2024-02-28 DIAGNOSIS — D72.819 LEUKOPENIA, UNSPECIFIED TYPE: Primary | ICD-10-CM

## 2024-03-07 ENCOUNTER — LAB (OUTPATIENT)
Dept: LAB | Facility: HOSPITAL | Age: 64
End: 2024-03-07
Payer: COMMERCIAL

## 2024-03-07 ENCOUNTER — CONSULT (OUTPATIENT)
Dept: ONCOLOGY | Facility: CLINIC | Age: 64
End: 2024-03-07
Payer: COMMERCIAL

## 2024-03-07 VITALS
HEART RATE: 82 BPM | RESPIRATION RATE: 16 BRPM | WEIGHT: 224 LBS | HEIGHT: 73 IN | SYSTOLIC BLOOD PRESSURE: 126 MMHG | DIASTOLIC BLOOD PRESSURE: 80 MMHG | OXYGEN SATURATION: 95 % | TEMPERATURE: 98.2 F | BODY MASS INDEX: 29.69 KG/M2

## 2024-03-07 DIAGNOSIS — R06.02 SHORTNESS OF BREATH: ICD-10-CM

## 2024-03-07 DIAGNOSIS — D72.819 LEUKOPENIA, UNSPECIFIED TYPE: Primary | ICD-10-CM

## 2024-03-07 DIAGNOSIS — D69.6 THROMBOCYTOPENIA: ICD-10-CM

## 2024-03-07 DIAGNOSIS — D64.9 ANEMIA, UNSPECIFIED TYPE: ICD-10-CM

## 2024-03-07 DIAGNOSIS — R19.5 DARK STOOLS: ICD-10-CM

## 2024-03-07 DIAGNOSIS — R63.4 UNINTENTIONAL WEIGHT LOSS: ICD-10-CM

## 2024-03-07 DIAGNOSIS — M54.2 NECK PAIN: ICD-10-CM

## 2024-03-07 DIAGNOSIS — R79.89 ELEVATED LFTS: ICD-10-CM

## 2024-03-07 DIAGNOSIS — D72.819 LEUKOPENIA, UNSPECIFIED TYPE: ICD-10-CM

## 2024-03-07 LAB
ALBUMIN SERPL-MCNC: 4.4 G/DL (ref 3.5–5.2)
ALBUMIN/GLOB SERPL: 1.3 G/DL
ALP SERPL-CCNC: 88 U/L (ref 39–117)
ALT SERPL W P-5'-P-CCNC: 65 U/L (ref 1–41)
ANION GAP SERPL CALCULATED.3IONS-SCNC: 12 MMOL/L (ref 5–15)
ANISOCYTOSIS BLD QL: ABNORMAL
AST SERPL-CCNC: 52 U/L (ref 1–40)
BILIRUB SERPL-MCNC: 0.7 MG/DL (ref 0–1.2)
BUN SERPL-MCNC: 13 MG/DL (ref 8–23)
BUN/CREAT SERPL: 14.4 (ref 7–25)
CALCIUM SPEC-SCNC: 9.1 MG/DL (ref 8.6–10.5)
CHLORIDE SERPL-SCNC: 100 MMOL/L (ref 98–107)
CO2 SERPL-SCNC: 27 MMOL/L (ref 22–29)
CREAT SERPL-MCNC: 0.9 MG/DL (ref 0.76–1.27)
CRP SERPL-MCNC: 3.88 MG/DL (ref 0–0.5)
CYTOLOGIST CVX/VAG CYTO: NORMAL
DEPRECATED RDW RBC AUTO: 49.7 FL (ref 37–54)
EGFRCR SERPLBLD CKD-EPI 2021: 96 ML/MIN/1.73
EOSINOPHIL # BLD MANUAL: 0.07 10*3/MM3 (ref 0–0.4)
EOSINOPHIL NFR BLD MANUAL: 2 % (ref 0.3–6.2)
ERYTHROCYTE [DISTWIDTH] IN BLOOD BY AUTOMATED COUNT: 14.7 % (ref 12.3–15.4)
FERRITIN SERPL-MCNC: 1226 NG/ML (ref 30–400)
FOLATE SERPL-MCNC: 15.1 NG/ML (ref 4.78–24.2)
GIANT PLATELETS: ABNORMAL
GLOBULIN UR ELPH-MCNC: 3.4 GM/DL
GLUCOSE SERPL-MCNC: 85 MG/DL (ref 65–99)
HCT VFR BLD AUTO: 37.3 % (ref 37.5–51)
HGB BLD-MCNC: 12.2 G/DL (ref 13–17.7)
HOLD SPECIMEN: NORMAL
IRON 24H UR-MRATE: 33 MCG/DL (ref 59–158)
IRON SATN MFR SERPL: 11 % (ref 20–50)
LDH SERPL-CCNC: 492 U/L (ref 135–225)
LYMPHOCYTES # BLD MANUAL: 0.66 10*3/MM3 (ref 0.7–3.1)
LYMPHOCYTES NFR BLD MANUAL: 26 % (ref 5–12)
MCH RBC QN AUTO: 30.1 PG (ref 26.6–33)
MCHC RBC AUTO-ENTMCNC: 32.7 G/DL (ref 31.5–35.7)
MCV RBC AUTO: 92.1 FL (ref 79–97)
METAMYELOCYTES NFR BLD MANUAL: 1 % (ref 0–0)
MONOCYTES # BLD: 0.95 10*3/MM3 (ref 0.1–0.9)
MYELOCYTES NFR BLD MANUAL: 2 % (ref 0–0)
NEUTROPHILS # BLD AUTO: 1.86 10*3/MM3 (ref 1.7–7)
NEUTROPHILS NFR BLD MANUAL: 46 % (ref 42.7–76)
NEUTS BAND NFR BLD MANUAL: 5 % (ref 0–5)
NRBC SPEC MANUAL: 1 /100 WBC (ref 0–0.2)
PATH INTERP BLD-IMP: NORMAL
PLATELET # BLD AUTO: 163 10*3/MM3 (ref 140–450)
PMV BLD AUTO: 9.8 FL (ref 6–12)
POIKILOCYTOSIS BLD QL SMEAR: ABNORMAL
POTASSIUM SERPL-SCNC: 4.6 MMOL/L (ref 3.5–5.2)
PROT SERPL-MCNC: 7.8 G/DL (ref 6–8.5)
RBC # BLD AUTO: 4.05 10*6/MM3 (ref 4.14–5.8)
SODIUM SERPL-SCNC: 139 MMOL/L (ref 136–145)
TIBC SERPL-MCNC: 288 MCG/DL (ref 298–536)
TRANSFERRIN SERPL-MCNC: 193 MG/DL (ref 200–360)
VARIANT LYMPHS NFR BLD MANUAL: 16 % (ref 19.6–45.3)
VARIANT LYMPHS NFR BLD MANUAL: 2 % (ref 0–5)
VIT B12 BLD-MCNC: 1092 PG/ML (ref 211–946)
WBC MORPH BLD: NORMAL
WBC NRBC COR # BLD AUTO: 3.64 10*3/MM3 (ref 3.4–10.8)
WHOLE BLOOD HOLD SPECIMEN: NORMAL

## 2024-03-07 PROCEDURE — 82728 ASSAY OF FERRITIN: CPT

## 2024-03-07 PROCEDURE — 82607 VITAMIN B-12: CPT

## 2024-03-07 PROCEDURE — 36415 COLL VENOUS BLD VENIPUNCTURE: CPT

## 2024-03-07 PROCEDURE — 85007 BL SMEAR W/DIFF WBC COUNT: CPT

## 2024-03-07 PROCEDURE — 85025 COMPLETE CBC W/AUTO DIFF WBC: CPT

## 2024-03-07 PROCEDURE — 88275 CYTOGENETICS 100-300: CPT

## 2024-03-07 PROCEDURE — 88237 TISSUE CULTURE BONE MARROW: CPT

## 2024-03-07 PROCEDURE — 88271 CYTOGENETICS DNA PROBE: CPT

## 2024-03-07 PROCEDURE — 86140 C-REACTIVE PROTEIN: CPT

## 2024-03-07 PROCEDURE — 83540 ASSAY OF IRON: CPT

## 2024-03-07 PROCEDURE — 82746 ASSAY OF FOLIC ACID SERUM: CPT

## 2024-03-07 PROCEDURE — 83615 LACTATE (LD) (LDH) ENZYME: CPT

## 2024-03-07 PROCEDURE — 84466 ASSAY OF TRANSFERRIN: CPT

## 2024-03-07 PROCEDURE — 80053 COMPREHEN METABOLIC PANEL: CPT

## 2024-03-07 PROCEDURE — 82525 ASSAY OF COPPER: CPT

## 2024-03-07 PROCEDURE — 85060 BLOOD SMEAR INTERPRETATION: CPT

## 2024-03-07 PROCEDURE — 82232 ASSAY OF BETA-2 PROTEIN: CPT

## 2024-03-07 NOTE — PROGRESS NOTES
[facility] HEMATOLOGY & ONCOLOGY  [address]  [facility] [address] [phone]    Patient Name: Gregorio Callahan  Encounter Date: 03/07/2024   YOB: 1960  Patient Number: [id number]    Initial Note     HISTORY OF PRESENT ILLNESS: Gregorio Callahan is a 64 y.o. male referred by Ajith Barrientos, * for diagnostic and management recommendations for low WBC and low platelets. History is obtained from patient. History is considered to be accurate.    He has health history significant for HTN, CAD, Hyperlipidemia, Hx of MI (2007), Ex Tobacco user:  quit in 2007     He had colonoscopy 3/21/22:   - The entire examined colon is normal on direct and retroflexion views. No specimens collected.  10 year recall.      Previous labs reviewed:   2/1/24:  WBC 2.59, ANC 0.49, AMC 0.96  8/1/23:  WBC 3.48, ANC 1.49  1/26/23:  WBC 3.21, ANC 1.52, Plt 108  7/27/22:  Plt 111  1/19/22:  Plt 113  11/3/21:  127     Drinks beer appox a case / month    He complains of weight loss.  Weight reviewed and he has lost 5 pounds since 2/1/2024.   He complains of cough, Night sweats for the past 4  weeks, light headedness.     He had labs drawn today and results were reviewed with him in office.         LABS    Lab Results - Last 18 Months   Lab Units 03/19/24  0727 03/16/24  1402 03/07/24  1253 02/01/24  0717 08/01/23  0707 01/26/23  0712   HEMOGLOBIN g/dL 12.2* 12.3* 12.2* 13.2 13.2 13.9   HEMATOCRIT % 37.1* 37.5 37.3* 39.5 39.7 40.6   MCV fL 91.8 93.1 92.1 95.0 94.1 90.4   WBC 10*3/mm3 3.05* 2.81* 3.64 2.59* 3.48 3.21*   RDW % 15.4 14.7 14.7 14.4 13.4 12.6   MPV fL 10.0 10.3 9.8  --   --   --    PLATELETS 10*3/mm3 141 157 163 94* 103* 108*   IMM GRAN % %  --  0.7*  --   --   --   --    NEUTROS ABS 10*3/mm3 1.26* 1.46* 1.86 0.49* 1.49* 1.52*   LYMPHS ABS 10*3/mm3  --  0.85  --  CANCELED  1.11 1.28 CANCELED  1.06   MONOS ABS 10*3/mm3  --  0.46  --  0.96* 0.64 0.56    EOS ABS 10*3/mm3  --  0.01 0.07 CANCELED  --  CANCELED   EOSINOPHIL ABS 10*3/mm3  --   --   --  0.03 0.07  --    EOSINOPHIL % %  --   --   --  1.1 2.0  --    BASOS ABS 10*3/mm3  --  0.01  --  CANCELED  --  CANCELED  0.07   IMMATURE GRANS (ABS) 10*3/mm3  --  0.02  --   --   --   --    NRBC /100 WBC  --  0.0 1.0*  --   --   --    NEUTROPHIL % % 41.4*  --  46.0 19.1* 42.9 47.3   MONOCYTES % % 25.3*  --  26.0* 37.1* 18.4* 17.6*   BASOPHIL % %  --   --   --   --   --  2.2*   ATYP LYMPH % % 15.2*  --  2.0  --   --   --    ANISOCYTOSIS  Slight/1+  --  Slight/1+  --   --   --    GIANT PLT   --   --  Slight/1+  --   --   --        Lab Results - Last 18 Months   Lab Units 03/19/24  0727 03/16/24  1402 03/15/24  1626 03/07/24  1253 02/01/24  0717 08/01/23  0707 01/26/23  0712   GLUCOSE mg/dL 76 125*  --  85 88 96 92   SODIUM mmol/L 139 140  --  139 138 146* 141   POTASSIUM mmol/L 4.3 4.1  --  4.6 4.5 5.1 4.4   CO2 mmol/L 29.0 28.0  --  27.0 27.2 27.9 27.1   CHLORIDE mmol/L 101 105  --  100 104 108* 105   ANION GAP mmol/L 9.0 7.0  --  12.0  --   --   --    CREATININE mg/dL 0.82 0.79 1.30 0.90 1.01 0.91 1.05   BUN mg/dL 11 13  --  13 16 14 13   BUN / CREAT RATIO  13.4 16.5  --  14.4 15.8 15.4 12.4   CALCIUM mg/dL 9.3 9.3  --  9.1 9.3 9.5 9.2   ALK PHOS U/L 88 110  --  88 91 95 85   TOTAL PROTEIN g/dL 7.2 7.4  --  7.8  --   --   --    ALT (SGPT) U/L 32 35  --  65* 33 22 20   AST (SGOT) U/L 28 29  --  52* 38 27 28   BILIRUBIN mg/dL 0.5 0.3  --  0.7 0.9 0.5 0.7   ALBUMIN g/dL 4.3  3.8 4.4  --  4.4 4.9 4.4 5.2   GLOBULIN gm/dL 2.9 3.0  --  3.4  --   --   --        Lab Results - Last 18 Months   Lab Units 03/19/24 0727 03/07/24  1253   M-SPIKE g/dL Not Observed  --    KAPPA/LAMBDA RATIO, S  1.56  --    FREE LAMBDA LIGHT CHAINS mg/L 15.8  --    IG KAPPA FREE LIGHT CHAIN mg/L 24.7*  --    LDH U/L 365* 492*   REFERENCE LAB REPORT   --  See Attached Report       Lab Results - Last 18 Months   Lab Units 03/19/24 0727 03/07/24  1252  02/01/24  0717 08/01/23  0707 01/26/23  0712   IRON mcg/dL 54* 33*  --   --   --    TIBC mcg/dL 329 288*  --   --   --    IRON SATURATION (TSAT) % 16* 11*  --   --   --    FERRITIN ng/mL 644.80* 1,226.00*  --   --   --    TSH uIU/mL  --   --  2.180 2.700 2.230   FOLATE ng/mL  --  15.10  --   --   --          PAST MEDICAL HISTORY:  ALLERGIES:  No Known Allergies  CURRENT MEDICATIONS:  Outpatient Encounter Medications as of 3/7/2024   Medication Sig Dispense Refill    aspirin 81 MG EC tablet Take 1 tablet by mouth Daily.      atorvastatin (LIPITOR) 80 MG tablet Take 1 tablet by mouth Daily. 90 tablet 3    citalopram (CeleXA) 40 MG tablet TAKE 1 TABLET BY MOUTH EVERY DAY 90 tablet 3    ezetimibe (ZETIA) 10 MG tablet Take 1 tablet by mouth Daily for 360 days. 90 tablet 3    lisinopril (PRINIVIL,ZESTRIL) 5 MG tablet Take 1 tablet by mouth Daily. 90 tablet 3    nitroglycerin (NITROSTAT) 0.4 MG SL tablet TAKE 1 TABLET BY MOUTH EVERY 5 MINUTES UP TO 3 TIMES AS NEEDED FOR CHEST PAIN. 25 tablet 2    traZODone (DESYREL) 50 MG tablet TAKE 1 OR 2 TABLETS BY MOUTH AT BEDTIME AS NEEDED 180 tablet 3    vitamin B-12 (CYANOCOBALAMIN) 1000 MCG tablet Take 1 tablet by mouth Daily.      [DISCONTINUED] Ginkgo Biloba 40 MG tablet Take 40 mg by mouth Daily.      [DISCONTINUED] ibuprofen (ADVIL,MOTRIN) 200 MG tablet Take 1 tablet by mouth Every 6 (Six) Hours As Needed for Mild Pain.      [DISCONTINUED] Misc Natural Products (OSTEO BI-FLEX TRIPLE STRENGTH PO) Take  by mouth 2 (Two) Times a Day.      [DISCONTINUED] Potassium 99 MG tablet Take 99 mg by mouth Daily.      [DISCONTINUED] VIAGRA 100 MG tablet TAKE AS DIRECTED 6 tablet 3     No facility-administered encounter medications on file as of 3/7/2024.     ADULT ILLNESSES:  Patient Active Problem List   Diagnosis Code    Hyperlipidemia, mild E78.5    Benign essential hypertension I10    CAD in native artery I25.10    H/O acute myocardial infarction I25.2    History of PTCA Z98.61    SZYMANSKI  (dyspnea on exertion) R06.09    Encounter for screening for malignant neoplasm of colon Z12.11       HEALTH MAINTENANCE ITEMS:  Health Maintenance Due   Topic Date Due    RSV Vaccine - Adults (1 - 1-dose 60+ series) Never done       <no information>  Last Completed Colonoscopy            COLORECTAL CANCER SCREENING (COLONOSCOPY - Every 10 Years) Next due on 3/21/2032      03/14/2024  Occult Blood X 3, Stool - Stool, Per Rectum    03/21/2022  Surgical Procedure: COLONOSCOPY    03/21/2022  COLONOSCOPY    10/23/2018  Cologuard - Stool, Per Rectum    04/05/2011  COLONOSCOPY (Done)    Only the first 5 history entries have been loaded, but more history exists.                  Immunization History   Administered Date(s) Administered    COVID-19 (MODERNA) 1st,2nd,3rd Dose Monovalent 03/16/2021, 04/13/2021, 11/11/2021    COVID-19 (PFIZER) BIVALENT 12+YRS 10/25/2022    COVID-19 F23 (PFIZER) 12YRS+ (COMIRNATY) 10/19/2023    Flu Vaccine Intradermal Quad 18-64YR 10/18/2017    Flu Vaccine Quad PF >36MO 11/12/2016    Fluad Quad 65+ 08/28/2020    Fluzone (or Fluarix & Flulaval for VFC) >6mos 10/25/2021, 10/25/2022, 10/11/2023    Pneumococcal Conjugate 13-Valent (PCV13) 08/10/2016    Pneumococcal Polysaccharide (PPSV23) 10/23/2018    Shingrix 11/18/2019, 07/16/2020    Tdap 10/18/2017    flucelvax quad pfs =>4 YRS 10/23/2018, 11/12/2019     Last Completed Mammogram       This patient has no relevant Health Maintenance data.              FAMILY HISTORY:  Family History   Problem Relation Age of Onset    Heart disease Mother     Alzheimer's disease Father     Heart attack Sister     Heart attack Brother     No Known Problems Maternal Grandmother     No Known Problems Maternal Grandfather     No Known Problems Paternal Grandmother     No Known Problems Paternal Grandfather     Colon cancer Neg Hx     Colon polyps Neg Hx      SOCIAL HISTORY:  Social History     Socioeconomic History    Marital status:    Tobacco Use    Smoking  "status: Former     Current packs/day: 0.00     Average packs/day: 3.0 packs/day for 30.0 years (90.0 ttl pk-yrs)     Types: Cigarettes     Start date: 1977     Quit date: 2007     Years since quittin.2    Smokeless tobacco: Never    Tobacco comments:     age quit smokin   Vaping Use    Vaping status: Never Used   Substance and Sexual Activity    Alcohol use: Yes     Comment: Occasional alcohol use. Drinks beer.    Drug use: No    Sexual activity: Defer       REVIEW OF SYSTEMS:  Review of Systems   Constitutional:  Positive for fatigue and unexpected weight loss (5 pounds in 4 weeks, pt was on vacation and ate welll). Negative for activity change, appetite change, fever and unexpected weight gain.   HENT:  Negative for dental problem, facial swelling, swollen glands and trouble swallowing.    Eyes:  Negative for double vision and discharge.   Respiratory:  Positive for shortness of breath (Chronic but worsening). Negative for cough and wheezing.    Cardiovascular:  Negative for chest pain, palpitations and leg swelling.   Gastrointestinal:  Negative for abdominal pain, blood in stool, nausea and vomiting.   Endocrine: Negative.    Genitourinary:  Positive for urgency (Acute) and urinary incontinence. Negative for dysuria and hematuria.   Musculoskeletal:  Negative for arthralgias and myalgias.   Skin:  Negative for rash, skin lesions and wound.   Allergic/Immunologic: Negative for immunocompromised state.   Neurological:  Positive for headache (intermittent, rapid pain to right side of head). Negative for speech difficulty, light-headedness, memory problem and confusion.   Hematological:  Negative for adenopathy.   Psychiatric/Behavioral:  Negative for self-injury, suicidal ideas and depressed mood. The patient is not nervous/anxious.        /80   Pulse 82   Temp 98.2 °F (36.8 °C)   Resp 16   Ht 185.4 cm (73\")   Wt 102 kg (224 lb)   SpO2 95%   BMI 29.55 kg/m²  Body surface area is 2.26 " meters squared.    Pain Score    03/07/24 1303   PainSc: 0-No pain       Physical Exam:  Physical Exam  Constitutional:       Appearance: Normal appearance.   HENT:      Head: Normocephalic and atraumatic.   Cardiovascular:      Rate and Rhythm: Normal rate and regular rhythm.   Pulmonary:      Effort: Pulmonary effort is normal.      Breath sounds: Normal breath sounds.   Abdominal:      General: Bowel sounds are normal.      Palpations: Abdomen is soft.   Musculoskeletal:      Right lower leg: No edema.      Left lower leg: No edema.   Skin:     General: Skin is warm and dry.   Neurological:      Mental Status: He is alert and oriented to person, place, and time.   Psychiatric:         Attention and Perception: Attention normal.         Mood and Affect: Mood normal.         Judgment: Judgment normal.         Gregorio Callahan reports a pain score of 0.  Given his pain assessment as noted, treatment options were discussed and the following options were decided upon as a follow-up plan to address the patient's pain:  No intervention indicated .      ASSESSMENT / PLAN:    1. Leukopenia, unspecified type    2. Thrombocytopenia    3. Anemia, unspecified type    4. Elevated LFTs    5. Shortness of breath    6. Unintentional weight loss    7. Neck pain    8. Dark stools        Leukopenia   Thrombocytopenia   Anemia   -Labs today: WBC 3.64, Hgb 12.2, Hct 37.3, 163, ANC 1.86, ALC 0.66, AMC 0.95   -Iron 33, Ferritin 1226, Sat 11%, TIBC 288  -Flow Cytometry with increased monocytes, 25% of leukocytes  -Peripheral Smear   Normal white blood cell count with normal morphology and mild absolute neutropenia and monocytosis.   No circulating blasts.   Minimal normocytic normochromic anemia with normal red blood cell morphology.   No circulating schistocytes.   Adequate platelets with normal morphology.   -FISH for MDS    Meds at time of Consultation   Lisinopril:  Bone marrow depression, eosinophilia, hemolytic anemia, increased  erythrocyte sedimentation rate, leukocytosis, leukopenia, neutropenia, positive LILIANA titer, pseudolymphoma (cutaneous), thrombocytopenia  Celexa: Anemia, disorder of hemostatic components of blood, hypochromic anemia, leukocytosis, leukopenia, lymphadenopathy, lymphocytopenia, lymphocytosis, purpuric disease  Zetia (ezetimibe): Thrombocytopenia  Trazodone: Anemia, hemolytic anemia, leukocytosis, methemoglobinemia  Viagra: anemia and hematuria    4.  Elevated LFTs.  5.  Unintentional weight loss   6. Neck pain  -AST 52, ALT 95  -, CRP 3.88, B2M 3.4  -Will order CT Neck, Chest, Abdomen/Pelvis to evaluated for lymphadenopathy, organomegaly       PLAN:   1.   regarding the reason for the referral.   2.   regarding neutropenia and thrombocytopenia and differential diagnosis.      3.  Labs  pending for  FISH for MDS, SPEP, KAMRON, Light Chains.  Will order CT Neck, Chest, Abdomen, Pelvis   4.  Continue current medications, follow up, treatment plans per PCP and any other provider.   5.  Return to office 2 weeks   6.  Care discussed with patient.  Understanding expressed.  Patient agreeable with plan.   7.  Further recommendations pending.        ACP discussion was held with the patient during this visit. Patient does not have an advance directive, information provided. Via AVS    Thank you for the referral.    I spent 40 minutes caring for Gregorio on this date of service. This time includes time spent by me in the following activities: preparing for the visit, reviewing tests, performing a medically appropriate examination and/or evaluation, counseling and educating the patient/family/caregiver, ordering medications, tests, or procedures, documenting information in the medical record, and care coordination.        Twila Wick, APRN  03/07/2024

## 2024-03-08 DIAGNOSIS — D69.6 THROMBOCYTOPENIA: Primary | ICD-10-CM

## 2024-03-08 DIAGNOSIS — D72.819 LEUKOPENIA, UNSPECIFIED TYPE: ICD-10-CM

## 2024-03-08 LAB — B2 MICROGLOB SERPL-MCNC: 3.4 MG/L (ref 0.8–2.2)

## 2024-03-09 LAB — COPPER SERPL-MCNC: 143 UG/DL (ref 69–132)

## 2024-03-13 ENCOUNTER — PATIENT ROUNDING (BHMG ONLY) (OUTPATIENT)
Dept: ONCOLOGY | Facility: CLINIC | Age: 64
End: 2024-03-13
Payer: COMMERCIAL

## 2024-03-13 DIAGNOSIS — D72.819 LEUKOPENIA, UNSPECIFIED TYPE: Primary | ICD-10-CM

## 2024-03-13 LAB
CELLS ANALYZED: NORMAL
CELLS COUNTED: NORMAL
CHROM ANALY INTERPHASE BLD/MAR FISH-IMP: NORMAL
CLINICAL CYTOGENETICIST SPEC: NORMAL
NARRATIVE DIAGNOSTIC REPORT-IMP: NORMAL
SPECIMEN SOURCE: NORMAL

## 2024-03-13 NOTE — PROGRESS NOTES
March 13, 2024    Hello, may I speak with Gregorio Callahan?    My name is Delmy Linder       I am  with [facility] HEMATOLOGY & ONCOLOGY  [address]  [facility] [facility] [address] [phone].    Before we get started may I verify your date of birth? 1960    I am calling to officially welcome you to our practice and ask about your recent visit. Is this a good time to talk? yes    Tell me about your visit with us. What things went well?  Covered everything very well        We're always looking for ways to make our patients' experiences even better. Do you have recommendations on ways we may improve?  no    Overall were you satisfied with your first visit to our practice? yes       I appreciate you taking the time to speak with me today. Is there anything else I can do for you? no      Thank you, and have a great day.

## 2024-03-14 ENCOUNTER — LAB (OUTPATIENT)
Dept: LAB | Facility: HOSPITAL | Age: 64
End: 2024-03-14
Payer: COMMERCIAL

## 2024-03-14 DIAGNOSIS — R19.5 DARK STOOLS: ICD-10-CM

## 2024-03-14 LAB
COLLECT DATE SP2 STL: NORMAL
COLLECT DATE SP3 STL: NORMAL
COLLECT DATE STL: NORMAL
GASTROCULT GAST QL: NEGATIVE
HEMOCCULT SP2 STL QL: NEGATIVE
HEMOCCULT SP3 STL QL: NEGATIVE
Lab: 1457
Lab: 1543
Lab: 725

## 2024-03-14 PROCEDURE — 82272 OCCULT BLD FECES 1-3 TESTS: CPT

## 2024-03-15 ENCOUNTER — HOSPITAL ENCOUNTER (OUTPATIENT)
Dept: CT IMAGING | Facility: HOSPITAL | Age: 64
Discharge: HOME | End: 2024-03-15
Admitting: NURSE PRACTITIONER
Payer: COMMERCIAL

## 2024-03-15 DIAGNOSIS — R79.89 ELEVATED LFTS: ICD-10-CM

## 2024-03-15 DIAGNOSIS — D72.819 LEUKOPENIA, UNSPECIFIED TYPE: ICD-10-CM

## 2024-03-15 DIAGNOSIS — D69.6 THROMBOCYTOPENIA: ICD-10-CM

## 2024-03-15 DIAGNOSIS — R63.4 UNINTENTIONAL WEIGHT LOSS: ICD-10-CM

## 2024-03-15 DIAGNOSIS — M54.2 NECK PAIN: ICD-10-CM

## 2024-03-15 DIAGNOSIS — D64.9 ANEMIA, UNSPECIFIED TYPE: ICD-10-CM

## 2024-03-15 DIAGNOSIS — R06.02 SHORTNESS OF BREATH: ICD-10-CM

## 2024-03-15 LAB — CREAT BLDA-MCNC: 1.3 MG/DL (ref 0.6–1.3)

## 2024-03-15 PROCEDURE — 74177 CT ABD & PELVIS W/CONTRAST: CPT

## 2024-03-15 PROCEDURE — 82565 ASSAY OF CREATININE: CPT

## 2024-03-15 PROCEDURE — 25510000001 IOPAMIDOL 61 % SOLUTION: Performed by: NURSE PRACTITIONER

## 2024-03-15 PROCEDURE — 70491 CT SOFT TISSUE NECK W/DYE: CPT

## 2024-03-15 PROCEDURE — 71260 CT THORAX DX C+: CPT

## 2024-03-15 RX ADMIN — IOPAMIDOL 100 ML: 612 INJECTION, SOLUTION INTRAVENOUS at 16:41

## 2024-03-16 ENCOUNTER — DOCUMENTATION (OUTPATIENT)
Dept: ONCOLOGY | Facility: CLINIC | Age: 64
End: 2024-03-16
Payer: COMMERCIAL

## 2024-03-16 ENCOUNTER — HOSPITAL ENCOUNTER (EMERGENCY)
Facility: HOSPITAL | Age: 64
Discharge: HOME | End: 2024-03-16
Attending: FAMILY MEDICINE
Payer: COMMERCIAL

## 2024-03-16 VITALS
RESPIRATION RATE: 18 BRPM | TEMPERATURE: 97.7 F | HEART RATE: 84 BPM | DIASTOLIC BLOOD PRESSURE: 84 MMHG | OXYGEN SATURATION: 100 % | BODY MASS INDEX: 29.82 KG/M2 | HEIGHT: 73 IN | WEIGHT: 225 LBS | SYSTOLIC BLOOD PRESSURE: 142 MMHG

## 2024-03-16 DIAGNOSIS — D72.828 OTHER ELEVATED WHITE BLOOD CELL (WBC) COUNT: ICD-10-CM

## 2024-03-16 DIAGNOSIS — R91.8 PULMONARY NODULES/LESIONS, MULTIPLE: Primary | ICD-10-CM

## 2024-03-16 LAB
ALBUMIN SERPL-MCNC: 4.4 G/DL (ref 3.5–5.2)
ALBUMIN/GLOB SERPL: 1.5 G/DL
ALP SERPL-CCNC: 110 U/L (ref 39–117)
ALT SERPL W P-5'-P-CCNC: 35 U/L (ref 1–41)
ANION GAP SERPL CALCULATED.3IONS-SCNC: 7 MMOL/L (ref 5–15)
APTT PPP: 29.4 SECONDS (ref 24.5–36)
AST SERPL-CCNC: 29 U/L (ref 1–40)
BASOPHILS # BLD AUTO: 0.01 10*3/MM3 (ref 0–0.2)
BASOPHILS NFR BLD AUTO: 0.4 % (ref 0–1.5)
BILIRUB SERPL-MCNC: 0.3 MG/DL (ref 0–1.2)
BUN SERPL-MCNC: 13 MG/DL (ref 8–23)
BUN/CREAT SERPL: 16.5 (ref 7–25)
CALCIUM SPEC-SCNC: 9.3 MG/DL (ref 8.6–10.5)
CHLORIDE SERPL-SCNC: 105 MMOL/L (ref 98–107)
CO2 SERPL-SCNC: 28 MMOL/L (ref 22–29)
CREAT SERPL-MCNC: 0.79 MG/DL (ref 0.76–1.27)
D-LACTATE SERPL-SCNC: 1.9 MMOL/L (ref 0.5–2)
DEPRECATED RDW RBC AUTO: 49.1 FL (ref 37–54)
EGFRCR SERPLBLD CKD-EPI 2021: 99.8 ML/MIN/1.73
EOSINOPHIL # BLD AUTO: 0.01 10*3/MM3 (ref 0–0.4)
EOSINOPHIL NFR BLD AUTO: 0.4 % (ref 0.3–6.2)
ERYTHROCYTE [DISTWIDTH] IN BLOOD BY AUTOMATED COUNT: 14.7 % (ref 12.3–15.4)
GLOBULIN UR ELPH-MCNC: 3 GM/DL
GLUCOSE SERPL-MCNC: 125 MG/DL (ref 65–99)
HCT VFR BLD AUTO: 37.5 % (ref 37.5–51)
HGB BLD-MCNC: 12.3 G/DL (ref 13–17.7)
IMM GRANULOCYTES # BLD AUTO: 0.02 10*3/MM3 (ref 0–0.05)
IMM GRANULOCYTES NFR BLD AUTO: 0.7 % (ref 0–0.5)
INR PPP: 0.98 (ref 0.91–1.09)
LYMPHOCYTES # BLD AUTO: 0.85 10*3/MM3 (ref 0.7–3.1)
LYMPHOCYTES NFR BLD AUTO: 30.2 % (ref 19.6–45.3)
MAGNESIUM SERPL-MCNC: 2.3 MG/DL (ref 1.6–2.4)
MCH RBC QN AUTO: 30.5 PG (ref 26.6–33)
MCHC RBC AUTO-ENTMCNC: 32.8 G/DL (ref 31.5–35.7)
MCV RBC AUTO: 93.1 FL (ref 79–97)
MONOCYTES # BLD AUTO: 0.46 10*3/MM3 (ref 0.1–0.9)
MONOCYTES NFR BLD AUTO: 16.4 % (ref 5–12)
NEUTROPHILS NFR BLD AUTO: 1.46 10*3/MM3 (ref 1.7–7)
NEUTROPHILS NFR BLD AUTO: 51.9 % (ref 42.7–76)
NRBC BLD AUTO-RTO: 0 /100 WBC (ref 0–0.2)
PLATELET # BLD AUTO: 157 10*3/MM3 (ref 140–450)
PMV BLD AUTO: 10.3 FL (ref 6–12)
POTASSIUM SERPL-SCNC: 4.1 MMOL/L (ref 3.5–5.2)
PROT SERPL-MCNC: 7.4 G/DL (ref 6–8.5)
PROTHROMBIN TIME: 13.4 SECONDS (ref 11.8–14.8)
RBC # BLD AUTO: 4.03 10*6/MM3 (ref 4.14–5.8)
SODIUM SERPL-SCNC: 140 MMOL/L (ref 136–145)
WBC NRBC COR # BLD AUTO: 2.81 10*3/MM3 (ref 3.4–10.8)

## 2024-03-16 PROCEDURE — 85730 THROMBOPLASTIN TIME PARTIAL: CPT | Performed by: FAMILY MEDICINE

## 2024-03-16 PROCEDURE — 80053 COMPREHEN METABOLIC PANEL: CPT | Performed by: FAMILY MEDICINE

## 2024-03-16 PROCEDURE — 99283 EMERGENCY DEPT VISIT LOW MDM: CPT

## 2024-03-16 PROCEDURE — 87040 BLOOD CULTURE FOR BACTERIA: CPT | Performed by: FAMILY MEDICINE

## 2024-03-16 PROCEDURE — 83735 ASSAY OF MAGNESIUM: CPT | Performed by: FAMILY MEDICINE

## 2024-03-16 PROCEDURE — 83605 ASSAY OF LACTIC ACID: CPT | Performed by: FAMILY MEDICINE

## 2024-03-16 PROCEDURE — 85610 PROTHROMBIN TIME: CPT | Performed by: FAMILY MEDICINE

## 2024-03-16 PROCEDURE — 85025 COMPLETE CBC W/AUTO DIFF WBC: CPT | Performed by: FAMILY MEDICINE

## 2024-03-16 RX ORDER — SODIUM CHLORIDE 0.9 % (FLUSH) 0.9 %
10 SYRINGE (ML) INJECTION AS NEEDED
Status: DISCONTINUED | OUTPATIENT
Start: 2024-03-16 | End: 2024-03-16 | Stop reason: HOSPADM

## 2024-03-16 NOTE — PROGRESS NOTES
"I called the patient and his wife about the results of the CT-scan which showed \"innumerable bilateral groundglass and solid pulmonary nodules, some of which appear cavitary. This has a broad differential which would include septic emboli, metastatic disease, atypical infection (fungal mycobacterial) or vasculitis.\" Clinically, the patient does not feel well    I recommended that he goes to the ED to be hospitalized:   - For possible septic emboli, to obtain infectious workup, echocardiography, start broad-spectrum antibiotics, and consider consulting ID  - Given the wide differential diagnosis of the lung patchy opacities, recommend to consult pulmonary for consideration of bronchoscopy.  "

## 2024-03-16 NOTE — ED PROVIDER NOTES
CHIEF COMPLAINT  Chief Complaint   Patient presents with    Abnormal Imaging     HPI  Gregorio Callahan is a 64 y.o. white male who presents to the emergency room after being told to come to the emergency room because he has had abnormal CT scan.  His CT scan of the chest showed pulmonary nodules which could be possibly metastatic disease, septic emboli, or infection, or fungal infection.  Patient states he actually feels good and did not tell the physician who is the oncologist that he felt bad however the notes that was placed in the system states that the patient clinically felt bad.  Patient states he did not say that.  Patient is not sure what could be done here in the emergency room because at this time he was told that he may need a bronchoscopy or other scans which could be done outpatient.  No fevers chills or night sweats.      EMS PRE-ARRIVAL TREATMENT:       REVIEW OF SYSTEMS  CONSTITUTIONAL: Positive for generalized fatigue which is greatly improved over the last 48 hours.    EYES:  No complaints of discharge   ENT: No complaints of sore throat or ear pain  CARDIOVASCULAR:  No complaints of chest pain, palpitations, or swelling  RESPIRATORY:  No complaints of cough or shortness of breath  GI:  No complaints of abdominal pain, nausea, vomiting, or diarrhea  MUSCULOSKELETAL:  No complaints of back pain  SKIN:  No complaints of rash  NEUROLOGIC:  No complaints of headache, focal weakness, or sensory changes  ENDOCRINE:  No complaints of polyuria or polydipsia  LYMPHATIC:  No complaints of swollen glands  GENITOURINARY: No complaints of urinary frequency or hematuria      PAST MEDICAL HISTORY  Past Medical History:   Diagnosis Date    Benign essential hypertension     CAD in native artery     Elevated cholesterol     H/O acute myocardial infarction     History of PTCA     Hyperlipidemia, mild     Sleep apnea     Tobacco use disorder        FAMILY HISTORY  Family History   Problem Relation Age of Onset    Heart  disease Mother     Alzheimer's disease Father     Heart attack Sister     Heart attack Brother     No Known Problems Maternal Grandmother     No Known Problems Maternal Grandfather     No Known Problems Paternal Grandmother     No Known Problems Paternal Grandfather     Colon cancer Neg Hx     Colon polyps Neg Hx        SOCIAL HISTORY  Social History     Socioeconomic History    Marital status:    Tobacco Use    Smoking status: Former     Current packs/day: 0.00     Average packs/day: 3.0 packs/day for 30.0 years (90.0 ttl pk-yrs)     Types: Cigarettes     Start date: 1977     Quit date: 2007     Years since quittin.2    Smokeless tobacco: Never    Tobacco comments:     age quit smokin   Vaping Use    Vaping status: Never Used   Substance and Sexual Activity    Alcohol use: Yes     Comment: Occasional alcohol use. Drinks beer.    Drug use: No    Sexual activity: Defer       IMMUNIZATION HISTORY  Deferred to primary care physician.    SURGICAL HISTORY  Past Surgical History:   Procedure Laterality Date    CARDIAC CATHETERIZATION      COLONOSCOPY      COLONOSCOPY N/A 3/21/2022    Procedure: COLONOSCOPY WITH ANESTHESIA;  Surgeon: Stanislav Clarke MD;  Location: [facility] ENDOSCOPY;  Service: Gastroenterology;  Laterality: N/A;  pre screen  post normal  Ajith Barrientos MD    CORONARY STENT PLACEMENT      x1       CURRENT MEDICATIONS    Current Facility-Administered Medications:     [COMPLETED] Insert Peripheral IV, , , Once **AND** sodium chloride 0.9 % flush 10 mL, 10 mL, Intravenous, PRN, Fernando Coello Jr., MD    Current Outpatient Medications:     aspirin 81 MG EC tablet, Take 1 tablet by mouth Daily., Disp: , Rfl:     atorvastatin (LIPITOR) 80 MG tablet, Take 1 tablet by mouth Daily., Disp: 90 tablet, Rfl: 3    citalopram (CeleXA) 40 MG tablet, TAKE 1 TABLET BY MOUTH EVERY DAY, Disp: 90 tablet, Rfl: 3    ezetimibe (ZETIA) 10 MG tablet, Take 1 tablet by mouth Daily for 360  "days., Disp: 90 tablet, Rfl: 3    lisinopril (PRINIVIL,ZESTRIL) 5 MG tablet, Take 1 tablet by mouth Daily., Disp: 90 tablet, Rfl: 3    nitroglycerin (NITROSTAT) 0.4 MG SL tablet, TAKE 1 TABLET BY MOUTH EVERY 5 MINUTES UP TO 3 TIMES AS NEEDED FOR CHEST PAIN., Disp: 25 tablet, Rfl: 2    traZODone (DESYREL) 50 MG tablet, TAKE 1 OR 2 TABLETS BY MOUTH AT BEDTIME AS NEEDED, Disp: 180 tablet, Rfl: 3    vitamin B-12 (CYANOCOBALAMIN) 1000 MCG tablet, Take 1 tablet by mouth Daily., Disp: , Rfl:     ALLERGIES  No Known Allergies    PHYSICAL EXAM  VITAL SIGNS:   /80   Pulse 84   Temp 97.7 °F (36.5 °C) (Oral)   Resp 18   Ht 185.4 cm (73\")   Wt 102 kg (225 lb)   SpO2 100%   BMI 29.69 kg/m²     Constitutional: Well developed. Well nourished. Alert.  HEENT: Normocephalic. Atraumatic. Oropharynx clear. Bilateral external ears normal. Oropharynx moist. Uvula in the midline position. Nose normal.     Eyes: PERRLA. EOMI. Conjunctiva normal. No discharge.     Neck: Supple. Normal range of motion. No tenderness. Trachea midline.    Cardiovascular: Normal heart rate. Normal rhythm. No murmurs. No rubs. No gallops.    Thorax & Lungs: Equal bilateral breath sounds.  Mildly decreased in mid lung fields bilaterally.  No respiratory distress. No retractions. No wheezes. No rales. No rhonchi.     Skin: Warm. No erythema. No rash. Normal color.     Abdomen: Normal bowel sounds. Soft. No distention. No tenderness to palpation. No guarding. No rebound. No palpable or pulsatile masses.    Extremities: Intact distal pulses. No edema. No tenderness. No deformities noted.     Musculoskeletal: No tenderness palpation of the 4 extremities       Neurologic: Alert & appropriate. Normal motor function. Normal sensory function. No focal deficits noted. Cranial nerves intact. Speech is clear.         RADIOLOGY/PROCEDURES        No orders to display          FUTURE APPOINTMENTS     Future Appointments   Date Time Provider Department Center "   3/19/2024  7:45 AM  [facility]  [facility]   3/19/2024  8:15 AM Twila Wick APRN [facility]   8/6/2024  7:45 AM Ajith Barrientos MD [facility]   11/15/2024  8:30 AM Wilner Chew MD [facility]   2/4/2025  7:45 AM Ajith Barrientos MD [facility]        COURSE & MEDICAL DECISION MAKING     Patient's partial differential diagnosis can include:    Metastatic lung disease, sarcoidosis, fungal infection, septic pulmonary embolism, , pneumonia and other      Discussed case with patient's nurse practitioner oncologist Twila Wick who states that she plan to have a further workup for the patient as an outpatient.  Explained to her that I will do the basic labs and if there is no acute findings that need to be acted upon remaining of the workup will be performed outpatient.  Did review in the system that the patient has an upcoming appointment with Ms. Twila Wick and oncology on 3/19/2024 at 7:30 AM.  I made this aware to her.  She would like to results of the laboratory test that the patient wants to return.  Her plan is to consult pulmonology as an outpatient as well as oncology continue his care and likely order an outpatient PET scan to assess for possible malignancy.  Concern is that the patient is more likely to have a malignancy causing his versus a septic embolus or other.    Patient laboratory values are stable.  There is no acute findings that are in need of acute intervention.  These labs were shared with nurse practitioner Twila Wick of hematology oncology with the patient has a follow-up appointment on Tuesday with.      Patient's level of risk: Moderate        CRITICAL CARE    CRITICAL CARE: No CRITICAL CARE TIME: None      Also Old charts were reviewed per [facility] EMR.  Pertinent details are summarized above.  All laboratory, radiologic, and EKG studies that were performed in the Emergency Department were a necessary part of the evaluation needed to  exclude unstable or  emergent medical conditions:     Patient was hemodynamically and neurologically stable in the ED.   Pertinent studies were reviewed as above.     Recent Results (from the past 24 hour(s))   POC Creatinine    Collection Time: 03/15/24  4:26 PM    Specimen: Blood   Result Value Ref Range    Creatinine 1.30 0.60 - 1.30 mg/dL   Comprehensive Metabolic Panel    Collection Time: 03/16/24  2:02 PM    Specimen: Blood   Result Value Ref Range    Glucose 125 (H) 65 - 99 mg/dL    BUN 13 8 - 23 mg/dL    Creatinine 0.79 0.76 - 1.27 mg/dL    Sodium 140 136 - 145 mmol/L    Potassium 4.1 3.5 - 5.2 mmol/L    Chloride 105 98 - 107 mmol/L    CO2 28.0 22.0 - 29.0 mmol/L    Calcium 9.3 8.6 - 10.5 mg/dL    Total Protein 7.4 6.0 - 8.5 g/dL    Albumin 4.4 3.5 - 5.2 g/dL    ALT (SGPT) 35 1 - 41 U/L    AST (SGOT) 29 1 - 40 U/L    Alkaline Phosphatase 110 39 - 117 U/L    Total Bilirubin 0.3 0.0 - 1.2 mg/dL    Globulin 3.0 gm/dL    A/G Ratio 1.5 g/dL    BUN/Creatinine Ratio 16.5 7.0 - 25.0    Anion Gap 7.0 5.0 - 15.0 mmol/L    eGFR 99.8 >60.0 mL/min/1.73   Protime-INR    Collection Time: 03/16/24  2:02 PM    Specimen: Blood   Result Value Ref Range    Protime 13.4 11.8 - 14.8 Seconds    INR 0.98 0.91 - 1.09   aPTT    Collection Time: 03/16/24  2:02 PM    Specimen: Blood   Result Value Ref Range    PTT 29.4 24.5 - 36.0 seconds   Magnesium    Collection Time: 03/16/24  2:02 PM    Specimen: Blood   Result Value Ref Range    Magnesium 2.3 1.6 - 2.4 mg/dL   Lactic Acid, Plasma    Collection Time: 03/16/24  2:02 PM    Specimen: Blood   Result Value Ref Range    Lactate 1.9 0.5 - 2.0 mmol/L   CBC Auto Differential    Collection Time: 03/16/24  2:02 PM    Specimen: Blood   Result Value Ref Range    WBC 2.81 (L) 3.40 - 10.80 10*3/mm3    RBC 4.03 (L) 4.14 - 5.80 10*6/mm3    Hemoglobin 12.3 (L) 13.0 - 17.7 g/dL    Hematocrit 37.5 37.5 - 51.0 %    MCV 93.1 79.0 - 97.0 fL    MCH 30.5 26.6 - 33.0 pg    MCHC 32.8 31.5 - 35.7 g/dL    RDW  14.7 12.3 - 15.4 %    RDW-SD 49.1 37.0 - 54.0 fl    MPV 10.3 6.0 - 12.0 fL    Platelets 157 140 - 450 10*3/mm3    Neutrophil % 51.9 42.7 - 76.0 %    Lymphocyte % 30.2 19.6 - 45.3 %    Monocyte % 16.4 (H) 5.0 - 12.0 %    Eosinophil % 0.4 0.3 - 6.2 %    Basophil % 0.4 0.0 - 1.5 %    Immature Grans % 0.7 (H) 0.0 - 0.5 %    Neutrophils, Absolute 1.46 (L) 1.70 - 7.00 10*3/mm3    Lymphocytes, Absolute 0.85 0.70 - 3.10 10*3/mm3    Monocytes, Absolute 0.46 0.10 - 0.90 10*3/mm3    Eosinophils, Absolute 0.01 0.00 - 0.40 10*3/mm3    Basophils, Absolute 0.01 0.00 - 0.20 10*3/mm3    Immature Grans, Absolute 0.02 0.00 - 0.05 10*3/mm3    nRBC 0.0 0.0 - 0.2 /100 WBC       The patient received:  Medications   sodium chloride 0.9 % flush 10 mL (has no administration in time range)            Disposition: Discharge home      Dragon disclaimer:  Part of this note may be an electronic transcription/translation of spoken language to printed text using the Dragon Dictation System.     I have reviewed the patient’s prescription history via a prescription monitoring program.  This information is consistent with my knowledge of the patient’s controlled substance use history.    Patient evaluate during Coronavirus Pandemic. Isolation practices followed according to [facility] policy.     FINAL IMPRESSION   Diagnosis Plan   1. Pulmonary nodules/lesions, multiple              MD Mode Zhang Jr, Thomas Mark Jr., MD  03/16/24 8870

## 2024-03-18 LAB — REF LAB TEST METHOD: NORMAL

## 2024-03-18 NOTE — PROGRESS NOTES
[facility] HEMATOLOGY & ONCOLOGY  [address]  [place] [address] [phone]    Patient Name: Gregorio Callahan  Encounter Date: 03/19/2024   YOB: 1960  Patient Number: [id number]     PROGRESS NOTE   HISTORY OF PRESENT ILLNESS: Gregorio Callahan is a 64 y.o. male was referred for low WBC and low platelets.   History is considered to be accurate.    He has health history significant for HTN, CAD, Hyperlipidemia, Hx of MI (2007), Ex Tobacco user:  quit in 2007     He had colonoscopy 3/21/22:   - The entire examined colon is normal on direct and retroflexion views. No specimens collected.  10 year recall.      Drinks beer appox a case per month month    Lost 5 pounds in 6 weeks unintentionally  Reports cough, Night sweats for the past 4  weeks, light headed.    INTERVAL HISTORY      Pt was seen in consultation on 3/7/24.  Ordered CT Neck, Chest, Abdomen, Pelvis     CT Neck 3/15/24  1. No neck mass or evidence of lymphadenopathy. No acute findings.   2. Questionable cerebellar tonsillar ectopia and possible Chiari I malformation, partially obscured by streak artifact. Consider follow-up with MRI of the brain.   CT Chest 3/15/24  1. Innumerable bilateral groundglass and solid pulmonary nodules, some of which appear cavitary. This has a broad differential which would include septic emboli, metastatic disease, atypical infection (fungal mycobacterial) or vasculitis.  2. Mild mediastinal adenopathy.  3. Borderline heart size with heavily calcified coronary arteries versus stent material.   4. Liver contour appears somewhat nodular. There may be mild splenomegaly. Correlate for chronic liver disease.  5. Indeterminate round sclerotic focus in the LEFT posterior fourth rib.  Recommend correlation with serum PSA and consider nuclear medicine bone scan if clinically indicated.    CT Abdomen 3/15/24  No acute findings in the abdomen/pelvis.     Minimal ectasia of the infrarenal abdominal aorta measuring up to 2.6 cm.    Per radiologist's recommendation, pt went to ER 3/16/24  to rule out septic emboli.  Preliminary blood culture negative.  Weight is stable.  Urgency and headaches have improved.    Still has fatigue and shortness of breath       LABS    Lab Results - Last 18 Months   Lab Units 03/19/24  0727 03/16/24  1402 03/07/24  1253 02/01/24  0717 08/01/23  0707 01/26/23  0712   HEMOGLOBIN g/dL 12.2* 12.3* 12.2* 13.2 13.2 13.9   HEMATOCRIT % 37.1* 37.5 37.3* 39.5 39.7 40.6   MCV fL 91.8 93.1 92.1 95.0 94.1 90.4   WBC 10*3/mm3 3.05* 2.81* 3.64 2.59* 3.48 3.21*   RDW % 15.4 14.7 14.7 14.4 13.4 12.6   MPV fL 10.0 10.3 9.8  --   --   --    PLATELETS 10*3/mm3 141 157 163 94* 103* 108*   IMM GRAN % %  --  0.7*  --   --   --   --    NEUTROS ABS 10*3/mm3 1.26* 1.46* 1.86 0.49* 1.49* 1.52*   LYMPHS ABS 10*3/mm3  --  0.85  --  CANCELED  1.11 1.28 CANCELED  1.06   MONOS ABS 10*3/mm3  --  0.46  --  0.96* 0.64 0.56   EOS ABS 10*3/mm3  --  0.01 0.07 CANCELED  --  CANCELED   EOSINOPHIL ABS 10*3/mm3  --   --   --  0.03 0.07  --    EOSINOPHIL % %  --   --   --  1.1 2.0  --    BASOS ABS 10*3/mm3  --  0.01  --  CANCELED  --  CANCELED  0.07   IMMATURE GRANS (ABS) 10*3/mm3  --  0.02  --   --   --   --    NRBC /100 WBC  --  0.0 1.0*  --   --   --    NEUTROPHIL % % 41.4*  --  46.0 19.1* 42.9 47.3   MONOCYTES % % 25.3*  --  26.0* 37.1* 18.4* 17.6*   BASOPHIL % %  --   --   --   --   --  2.2*   ATYP LYMPH % % 15.2*  --  2.0  --   --   --    ANISOCYTOSIS  Slight/1+  --  Slight/1+  --   --   --    GIANT PLT   --   --  Slight/1+  --   --   --        Lab Results - Last 18 Months   Lab Units 03/19/24  0727 03/16/24  1402 03/15/24  1626 03/07/24  1253 02/01/24  0717 08/01/23  0707 01/26/23  0712   GLUCOSE mg/dL 76 125*  --  85 88 96 92   SODIUM mmol/L 139 140  --  139 138 146* 141   POTASSIUM mmol/L 4.3 4.1  --  4.6 4.5 5.1 4.4   CO2 mmol/L 29.0 28.0  --  27.0 27.2 27.9 27.1    CHLORIDE mmol/L 101 105  --  100 104 108* 105   ANION GAP mmol/L 9.0 7.0  --  12.0  --   --   --    CREATININE mg/dL 0.82 0.79 1.30 0.90 1.01 0.91 1.05   BUN mg/dL 11 13  --  13 16 14 13   BUN / CREAT RATIO  13.4 16.5  --  14.4 15.8 15.4 12.4   CALCIUM mg/dL 9.3 9.3  --  9.1 9.3 9.5 9.2   ALK PHOS U/L 88 110  --  88 91 95 85   TOTAL PROTEIN g/dL 7.2 7.4  --  7.8  --   --   --    ALT (SGPT) U/L 32 35  --  65* 33 22 20   AST (SGOT) U/L 28 29  --  52* 38 27 28   BILIRUBIN mg/dL 0.5 0.3  --  0.7 0.9 0.5 0.7   ALBUMIN g/dL 4.3  3.8 4.4  --  4.4 4.9 4.4 5.2   GLOBULIN gm/dL 2.9 3.0  --  3.4  --   --   --        Lab Results - Last 18 Months   Lab Units 03/19/24  0727 03/07/24  1253   M-SPIKE g/dL Not Observed  --    KAPPA/LAMBDA RATIO, S  1.56  --    FREE LAMBDA LIGHT CHAINS mg/L 15.8  --    IG KAPPA FREE LIGHT CHAIN mg/L 24.7*  --    LDH U/L 365* 492*   REFERENCE LAB REPORT   --  See Attached Report       Lab Results - Last 18 Months   Lab Units 03/19/24  0727 03/07/24  1253 02/01/24  0717 08/01/23  0707 01/26/23  0712   IRON mcg/dL 54* 33*  --   --   --    TIBC mcg/dL 329 288*  --   --   --    IRON SATURATION (TSAT) % 16* 11*  --   --   --    FERRITIN ng/mL 644.80* 1,226.00*  --   --   --    TSH uIU/mL  --   --  2.180 2.700 2.230   FOLATE ng/mL  --  15.10  --   --   --          PAST MEDICAL HISTORY:  ALLERGIES:  No Known Allergies  CURRENT MEDICATIONS:  Outpatient Encounter Medications as of 3/19/2024   Medication Sig Dispense Refill    aspirin 81 MG EC tablet Take 1 tablet by mouth Daily.      atorvastatin (LIPITOR) 80 MG tablet Take 1 tablet by mouth Daily. 90 tablet 3    citalopram (CeleXA) 40 MG tablet TAKE 1 TABLET BY MOUTH EVERY DAY 90 tablet 3    ezetimibe (ZETIA) 10 MG tablet Take 1 tablet by mouth Daily for 360 days. 90 tablet 3    lisinopril (PRINIVIL,ZESTRIL) 5 MG tablet Take 1 tablet by mouth Daily. 90 tablet 3    nitroglycerin (NITROSTAT) 0.4 MG SL tablet TAKE 1 TABLET BY MOUTH EVERY 5 MINUTES UP TO 3  TIMES AS NEEDED FOR CHEST PAIN. 25 tablet 2    traZODone (DESYREL) 50 MG tablet TAKE 1 OR 2 TABLETS BY MOUTH AT BEDTIME AS NEEDED 180 tablet 3    vitamin B-12 (CYANOCOBALAMIN) 1000 MCG tablet Take 1 tablet by mouth Daily.       No facility-administered encounter medications on file as of 3/19/2024.     ADULT ILLNESSES:  Patient Active Problem List   Diagnosis Code    Hyperlipidemia, mild E78.5    Benign essential hypertension I10    CAD in native artery I25.10    H/O acute myocardial infarction I25.2    History of PTCA Z98.61    SZYMANSKI (dyspnea on exertion) R06.09    Encounter for screening for malignant neoplasm of colon Z12.11       HEALTH MAINTENANCE ITEMS:  Health Maintenance Due   Topic Date Due    RSV Vaccine - Adults (1 - 1-dose 60+ series) Never done       <no information>  Last Completed Colonoscopy            COLORECTAL CANCER SCREENING (COLONOSCOPY - Every 10 Years) Next due on 3/21/2032      03/14/2024  Occult Blood X 3, Stool - Stool, Per Rectum    03/21/2022  Surgical Procedure: COLONOSCOPY    03/21/2022  COLONOSCOPY    10/23/2018  Cologuard - Stool, Per Rectum    04/05/2011  COLONOSCOPY (Done)    Only the first 5 history entries have been loaded, but more history exists.                  Immunization History   Administered Date(s) Administered    COVID-19 (MODERNA) 1st,2nd,3rd Dose Monovalent 03/16/2021, 04/13/2021, 11/11/2021    COVID-19 (PFIZER) BIVALENT 12+YRS 10/25/2022    COVID-19 F23 (PFIZER) 12YRS+ (COMIRNATY) 10/19/2023    Flu Vaccine Intradermal Quad 18-64YR 10/18/2017    Flu Vaccine Quad PF >36MO 11/12/2016    Fluad Quad 65+ 08/28/2020    Fluzone (or Fluarix & Flulaval for VFC) >6mos 10/25/2021, 10/25/2022, 10/11/2023    Pneumococcal Conjugate 13-Valent (PCV13) 08/10/2016    Pneumococcal Polysaccharide (PPSV23) 10/23/2018    Shingrix 11/18/2019, 07/16/2020    Tdap 10/18/2017    flucelvax quad pfs =>4 YRS 10/23/2018, 11/12/2019     Last Completed Mammogram       This patient has no relevant  Health Maintenance data.              FAMILY HISTORY:  Family History   Problem Relation Age of Onset    Heart disease Mother     Alzheimer's disease Father     Heart attack Sister     Heart attack Brother     No Known Problems Maternal Grandmother     No Known Problems Maternal Grandfather     No Known Problems Paternal Grandmother     No Known Problems Paternal Grandfather     Colon cancer Neg Hx     Colon polyps Neg Hx      SOCIAL HISTORY:  Social History     Socioeconomic History    Marital status:    Tobacco Use    Smoking status: Former     Current packs/day: 0.00     Average packs/day: 3.0 packs/day for 30.0 years (90.0 ttl pk-yrs)     Types: Cigarettes     Start date: 1977     Quit date: 2007     Years since quittin.2    Smokeless tobacco: Never    Tobacco comments:     age quit smokin   Vaping Use    Vaping status: Never Used   Substance and Sexual Activity    Alcohol use: Yes     Comment: Occasional alcohol use. Drinks beer.    Drug use: No    Sexual activity: Defer       REVIEW OF SYSTEMS:  Review of Systems   Constitutional:  Positive for fatigue and unexpected weight loss (5 pounds in 4 weeks, pt was on vacation and ate welll). Negative for activity change, appetite change, fever and unexpected weight gain.   HENT:  Negative for dental problem, facial swelling, swollen glands and trouble swallowing.    Eyes:  Negative for double vision and discharge.   Respiratory:  Positive for shortness of breath (Chronic but worsening). Negative for cough and wheezing.    Cardiovascular:  Negative for chest pain, palpitations and leg swelling.   Gastrointestinal:  Negative for abdominal pain, blood in stool, nausea and vomiting.   Endocrine: Negative.    Genitourinary:  Positive for urgency (Acute) and urinary incontinence. Negative for dysuria and hematuria.   Musculoskeletal:  Negative for arthralgias and myalgias.   Skin:  Negative for rash, skin lesions and wound.   Allergic/Immunologic:  "Negative for immunocompromised state.   Neurological:  Positive for headache (intermittent, rapid pain to right side of head). Negative for speech difficulty, light-headedness, memory problem and confusion.   Hematological:  Negative for adenopathy.   Psychiatric/Behavioral:  Negative for self-injury, suicidal ideas and depressed mood. The patient is not nervous/anxious.        /78   Pulse 76   Temp 97.4 °F (36.3 °C)   Resp 16   Ht 185.4 cm (73\")   Wt 102 kg (225 lb 8 oz)   SpO2 93%   BMI 29.75 kg/m²  Body surface area is 2.26 meters squared.    Pain Score    03/19/24 0757   PainSc: 0-No pain      Physical Exam  Constitutional:       Appearance: Normal appearance.   HENT:      Head: Normocephalic and atraumatic.   Cardiovascular:      Rate and Rhythm: Normal rate and regular rhythm.   Pulmonary:      Effort: Pulmonary effort is normal.      Breath sounds: Normal breath sounds.   Abdominal:      General: Bowel sounds are normal.      Palpations: Abdomen is soft.   Musculoskeletal:      Right lower leg: No edema.      Left lower leg: No edema.   Skin:     General: Skin is warm and dry.   Neurological:      Mental Status: He is alert and oriented to person, place, and time.   Psychiatric:         Attention and Perception: Attention normal.         Mood and Affect: Mood normal.         Judgment: Judgment normal.         Gregorio Callahan reports a pain score of 0.  Given his pain assessment as noted, treatment options were discussed and the following options were decided upon as a follow-up plan to address the patient's pain:  no intervention indicated .      ASSESSMENT / PLAN:    1. Multiple lung nodules on CT    2. Monocytosis    3. Anemia, unspecified type    4. Elevated LDH    5. Elevated beta-2 microglobulin      1.  Lung Nodules on CT Scan   CT Chest 3/15/24  1. Innumerable bilateral groundglass and solid pulmonary nodules, some of which appear cavitary. This has a broad differential which would include " septic emboli, metastatic disease, atypical infection (fungal mycobacterial) or vasculitis.  2. Mild mediastinal adenopathy.  3. Borderline heart size with heavily calcified coronary arteries versus stent material.   4. Liver contour appears somewhat nodular. There may be mild splenomegaly. Correlate for chronic liver disease.  5. Indeterminate round sclerotic focus in the LEFT posterior fourth rib.  Recommend correlation with serum PSA and consider nuclear medicine bone scan if clinically indicated.   -Will refer to pulmonology   -Ordered PET Scan      2.  Leukopenia   3.  Monocytosis   4.  Anemia   -Labs today:  WBC 3.05, Hgb 12.2,  Hct 37.1, ANC 1.26, Iron 54, Ferritin 644, Sat 16%, TIBC 329  -Iron deficiency r/t chronic disease/inflammation..?  Meds at time of Consultation   Lisinopril:  Bone marrow depression, eosinophilia, hemolytic anemia, increased erythrocyte sedimentation rate, leukocytosis, leukopenia, neutropenia, positive LILIANA titer, pseudolymphoma (cutaneous), thrombocytopenia  Celexa: Anemia, disorder of hemostatic components of blood, hypochromic anemia, leukocytosis, leukopenia, lymphadenopathy, lymphocytopenia, lymphocytosis, purpuric disease  Zetia (ezetimibe): Thrombocytopenia  Trazodone: Anemia, hemolytic anemia, leukocytosis, methemoglobinemia  Viagra: anemia and hematuria    5.  Elevated LDH  6.  Elevated B2M  -  -B2M 3.4  -No M Ángel, No monoclonality    PLAN:  Will refer pt to pulmonology for evaluation and biopsy  Will order PET Scan  Continue current medications, treatment plans and follow up with PCP and any other providers  Return to office based on results of PET Scan and recommendations of pulmonology.    Care discussed with patient.  Understanding expressed.  Patient agreeable with plan.         Twila Wick, APRN  03/19/2024

## 2024-03-19 ENCOUNTER — LAB (OUTPATIENT)
Dept: LAB | Facility: HOSPITAL | Age: 64
End: 2024-03-19
Payer: COMMERCIAL

## 2024-03-19 ENCOUNTER — OFFICE VISIT (OUTPATIENT)
Dept: ONCOLOGY | Facility: CLINIC | Age: 64
End: 2024-03-19
Payer: COMMERCIAL

## 2024-03-19 VITALS
DIASTOLIC BLOOD PRESSURE: 78 MMHG | RESPIRATION RATE: 16 BRPM | SYSTOLIC BLOOD PRESSURE: 126 MMHG | HEIGHT: 73 IN | WEIGHT: 225.5 LBS | BODY MASS INDEX: 29.88 KG/M2 | HEART RATE: 76 BPM | OXYGEN SATURATION: 93 % | TEMPERATURE: 97.4 F

## 2024-03-19 DIAGNOSIS — R79.89 ELEVATED LFTS: ICD-10-CM

## 2024-03-19 DIAGNOSIS — R91.8 MULTIPLE LUNG NODULES ON CT: Primary | ICD-10-CM

## 2024-03-19 DIAGNOSIS — R74.02 ELEVATED LDH: ICD-10-CM

## 2024-03-19 DIAGNOSIS — D72.821 MONOCYTOSIS: ICD-10-CM

## 2024-03-19 DIAGNOSIS — R79.89 ELEVATED BETA-2 MICROGLOBULIN: ICD-10-CM

## 2024-03-19 DIAGNOSIS — D64.9 ANEMIA, UNSPECIFIED TYPE: ICD-10-CM

## 2024-03-19 DIAGNOSIS — D72.819 LEUKOPENIA, UNSPECIFIED TYPE: Primary | ICD-10-CM

## 2024-03-19 DIAGNOSIS — D69.6 THROMBOCYTOPENIA: ICD-10-CM

## 2024-03-19 DIAGNOSIS — R91.8 MULTIPLE LUNG NODULES ON CT: ICD-10-CM

## 2024-03-19 LAB
ALBUMIN SERPL-MCNC: 4.3 G/DL (ref 3.5–5.2)
ALBUMIN/GLOB SERPL: 1.5 G/DL
ALP SERPL-CCNC: 88 U/L (ref 39–117)
ALT SERPL W P-5'-P-CCNC: 32 U/L (ref 1–41)
ANION GAP SERPL CALCULATED.3IONS-SCNC: 9 MMOL/L (ref 5–15)
ANISOCYTOSIS BLD QL: ABNORMAL
AST SERPL-CCNC: 28 U/L (ref 1–40)
BILIRUB SERPL-MCNC: 0.5 MG/DL (ref 0–1.2)
BUN SERPL-MCNC: 11 MG/DL (ref 8–23)
BUN/CREAT SERPL: 13.4 (ref 7–25)
CALCIUM SPEC-SCNC: 9.3 MG/DL (ref 8.6–10.5)
CHLORIDE SERPL-SCNC: 101 MMOL/L (ref 98–107)
CO2 SERPL-SCNC: 29 MMOL/L (ref 22–29)
CREAT SERPL-MCNC: 0.82 MG/DL (ref 0.76–1.27)
CRP SERPL-MCNC: <0.3 MG/DL (ref 0–0.5)
DEPRECATED RDW RBC AUTO: 50.4 FL (ref 37–54)
EGFRCR SERPLBLD CKD-EPI 2021: 98.7 ML/MIN/1.73
ERYTHROCYTE [DISTWIDTH] IN BLOOD BY AUTOMATED COUNT: 15.4 % (ref 12.3–15.4)
ERYTHROCYTE [SEDIMENTATION RATE] IN BLOOD: 24 MM/HR (ref 0–20)
FERRITIN SERPL-MCNC: 644.8 NG/ML (ref 30–400)
GLOBULIN UR ELPH-MCNC: 2.9 GM/DL
GLUCOSE SERPL-MCNC: 76 MG/DL (ref 65–99)
HCT VFR BLD AUTO: 37.1 % (ref 37.5–51)
HGB BLD-MCNC: 12.2 G/DL (ref 13–17.7)
HOLD SPECIMEN: NORMAL
IRON 24H UR-MRATE: 54 MCG/DL (ref 59–158)
IRON SATN MFR SERPL: 16 % (ref 20–50)
LDH SERPL-CCNC: 365 U/L (ref 135–225)
LYMPHOCYTES # BLD MANUAL: 1.02 10*3/MM3 (ref 0.7–3.1)
LYMPHOCYTES NFR BLD MANUAL: 25.3 % (ref 5–12)
MCH RBC QN AUTO: 30.2 PG (ref 26.6–33)
MCHC RBC AUTO-ENTMCNC: 32.9 G/DL (ref 31.5–35.7)
MCV RBC AUTO: 91.8 FL (ref 79–97)
MONOCYTES # BLD: 0.77 10*3/MM3 (ref 0.1–0.9)
NEUTROPHILS # BLD AUTO: 1.26 10*3/MM3 (ref 1.7–7)
NEUTROPHILS NFR BLD MANUAL: 41.4 % (ref 42.7–76)
PLAT MORPH BLD: NORMAL
PLATELET # BLD AUTO: 141 10*3/MM3 (ref 140–450)
PMV BLD AUTO: 10 FL (ref 6–12)
POLYCHROMASIA BLD QL SMEAR: ABNORMAL
POTASSIUM SERPL-SCNC: 4.3 MMOL/L (ref 3.5–5.2)
PROT SERPL-MCNC: 7.2 G/DL (ref 6–8.5)
RBC # BLD AUTO: 4.04 10*6/MM3 (ref 4.14–5.8)
SODIUM SERPL-SCNC: 139 MMOL/L (ref 136–145)
TIBC SERPL-MCNC: 329 MCG/DL (ref 298–536)
TRANSFERRIN SERPL-MCNC: 221 MG/DL (ref 200–360)
VARIANT LYMPHS NFR BLD MANUAL: 15.2 % (ref 0–5)
VARIANT LYMPHS NFR BLD MANUAL: 18.2 % (ref 19.6–45.3)
WBC MORPH BLD: NORMAL
WBC NRBC COR # BLD AUTO: 3.05 10*3/MM3 (ref 3.4–10.8)

## 2024-03-19 PROCEDURE — 83521 IG LIGHT CHAINS FREE EACH: CPT

## 2024-03-19 PROCEDURE — 86334 IMMUNOFIX E-PHORESIS SERUM: CPT

## 2024-03-19 PROCEDURE — 82728 ASSAY OF FERRITIN: CPT

## 2024-03-19 PROCEDURE — 83540 ASSAY OF IRON: CPT

## 2024-03-19 PROCEDURE — 86140 C-REACTIVE PROTEIN: CPT

## 2024-03-19 PROCEDURE — 85652 RBC SED RATE AUTOMATED: CPT

## 2024-03-19 PROCEDURE — 85025 COMPLETE CBC W/AUTO DIFF WBC: CPT

## 2024-03-19 PROCEDURE — 80053 COMPREHEN METABOLIC PANEL: CPT

## 2024-03-19 PROCEDURE — 82784 ASSAY IGA/IGD/IGG/IGM EACH: CPT

## 2024-03-19 PROCEDURE — 84466 ASSAY OF TRANSFERRIN: CPT

## 2024-03-19 PROCEDURE — 83615 LACTATE (LD) (LDH) ENZYME: CPT

## 2024-03-19 PROCEDURE — 84165 PROTEIN E-PHORESIS SERUM: CPT

## 2024-03-19 PROCEDURE — 36415 COLL VENOUS BLD VENIPUNCTURE: CPT

## 2024-03-19 PROCEDURE — 85007 BL SMEAR W/DIFF WBC COUNT: CPT

## 2024-03-20 LAB
ALBUMIN SERPL ELPH-MCNC: 3.8 G/DL (ref 2.9–4.4)
ALBUMIN/GLOB SERPL: 1.4 {RATIO} (ref 0.7–1.7)
ALPHA1 GLOB SERPL ELPH-MCNC: 0.2 G/DL (ref 0–0.4)
ALPHA2 GLOB SERPL ELPH-MCNC: 0.8 G/DL (ref 0.4–1)
B-GLOBULIN SERPL ELPH-MCNC: 1 G/DL (ref 0.7–1.3)
GAMMA GLOB SERPL ELPH-MCNC: 0.8 G/DL (ref 0.4–1.8)
GLOBULIN SER-MCNC: 2.9 G/DL (ref 2.2–3.9)
IGA SERPL-MCNC: 256 MG/DL (ref 61–437)
IGG SERPL-MCNC: 851 MG/DL (ref 603–1613)
IGM SERPL-MCNC: 53 MG/DL (ref 20–172)
INTERPRETATION SERPL IEP-IMP: ABNORMAL
KAPPA LC FREE SER-MCNC: 24.7 MG/L (ref 3.3–19.4)
KAPPA LC FREE/LAMBDA FREE SER: 1.56 {RATIO} (ref 0.26–1.65)
LABORATORY COMMENT REPORT: ABNORMAL
LAMBDA LC FREE SERPL-MCNC: 15.8 MG/L (ref 5.7–26.3)
M PROTEIN SERPL ELPH-MCNC: ABNORMAL G/DL
PROT SERPL-MCNC: 6.7 G/DL (ref 6–8.5)

## 2024-03-21 LAB — BACTERIA SPEC AEROBE CULT: NORMAL

## 2024-03-25 ENCOUNTER — TELEPHONE (OUTPATIENT)
Dept: ONCOLOGY | Facility: CLINIC | Age: 64
End: 2024-03-25

## 2024-03-25 NOTE — TELEPHONE ENCOUNTER
Caller: Kamron Callahan    Relationship: Self    Best call back number: [phone]    What is the best time to reach you: ANY    Who are you requesting to speak with (clinical staff, provider,  specific staff member): CLINICAL     What was the call regarding: KAMRON WAS REFERRED TO A PULMONOLOGY DOCTOR AND HE HAS NEVER HEARD FROM THAT OFFICE FOR AN APPOINTMENT     PLEASE ADVISE

## 2024-03-26 ENCOUNTER — TELEPHONE (OUTPATIENT)
Dept: ONCOLOGY | Facility: CLINIC | Age: 64
End: 2024-03-26
Payer: COMMERCIAL

## 2024-03-26 DIAGNOSIS — Z11.1 TUBERCULOSIS SCREENING: Primary | ICD-10-CM

## 2024-03-27 ENCOUNTER — LAB (OUTPATIENT)
Dept: LAB | Facility: HOSPITAL | Age: 64
End: 2024-03-27
Payer: COMMERCIAL

## 2024-03-27 ENCOUNTER — HOSPITAL ENCOUNTER (OUTPATIENT)
Dept: CT IMAGING | Facility: HOSPITAL | Age: 64
Discharge: HOME | End: 2024-03-27
Payer: COMMERCIAL

## 2024-03-27 DIAGNOSIS — D64.9 ANEMIA, UNSPECIFIED TYPE: ICD-10-CM

## 2024-03-27 DIAGNOSIS — R91.8 MULTIPLE LUNG NODULES ON CT: ICD-10-CM

## 2024-03-27 DIAGNOSIS — Z11.1 TUBERCULOSIS SCREENING: ICD-10-CM

## 2024-03-27 DIAGNOSIS — D64.9 ANEMIA, UNSPECIFIED TYPE: Primary | ICD-10-CM

## 2024-03-27 PROCEDURE — 78815 PET IMAGE W/CT SKULL-THIGH: CPT

## 2024-03-27 PROCEDURE — 0 FLUDEOXYGLUCOSE F18 SOLUTION: Performed by: NURSE PRACTITIONER

## 2024-03-27 PROCEDURE — A9552 F18 FDG: HCPCS | Performed by: NURSE PRACTITIONER

## 2024-03-27 PROCEDURE — 36415 COLL VENOUS BLD VENIPUNCTURE: CPT

## 2024-03-27 PROCEDURE — 82668 ASSAY OF ERYTHROPOIETIN: CPT

## 2024-03-27 PROCEDURE — 86480 TB TEST CELL IMMUN MEASURE: CPT

## 2024-03-27 RX ADMIN — FLUDEOXYGLUCOSE F 18 1 DOSE: 200 INJECTION, SOLUTION INTRAVENOUS at 08:50

## 2024-03-29 LAB
EPO SERPL-ACNC: 28.3 MIU/ML (ref 2.6–18.5)
GAMMA INTERFERON BACKGROUND BLD IA-ACNC: 0.14 IU/ML
M TB IFN-G BLD-IMP: NEGATIVE
M TB IFN-G CD4+ BCKGRND COR BLD-ACNC: 0.15 IU/ML
M TB IFN-G CD4+CD8+ BCKGRND COR BLD-ACNC: 0.11 IU/ML
MITOGEN IGNF BCKGRD COR BLD-ACNC: >10 IU/ML
SERVICE CMNT-IMP: NORMAL

## 2024-04-02 ENCOUNTER — TELEPHONE (OUTPATIENT)
Dept: ONCOLOGY | Facility: CLINIC | Age: 64
End: 2024-04-02
Payer: COMMERCIAL

## 2024-04-02 NOTE — TELEPHONE ENCOUNTER
Called and spoke to patient, he said he planned to call our office he has not been able to get anything coughed up and failing trying to do the sample.

## 2024-04-02 NOTE — TELEPHONE ENCOUNTER
----- Message from Kath Rivera CMA sent at 4/1/2024  2:08 PM CDT -----  Will you please call pt  and ask if he has returned his sputum sample yet? If not can you ask him when he will be able to please  ----- Message -----  From: Twila Wick APRN  Sent: 4/1/2024   8:09 AM CDT  To: Kath Rivera CMA    Has he returned the sputum culture?

## 2024-04-08 ENCOUNTER — LAB (OUTPATIENT)
Dept: LAB | Facility: HOSPITAL | Age: 64
End: 2024-04-08
Payer: COMMERCIAL

## 2024-04-08 ENCOUNTER — OFFICE VISIT (OUTPATIENT)
Dept: ONCOLOGY | Facility: CLINIC | Age: 64
End: 2024-04-08
Payer: COMMERCIAL

## 2024-04-08 VITALS
SYSTOLIC BLOOD PRESSURE: 118 MMHG | OXYGEN SATURATION: 95 % | RESPIRATION RATE: 18 BRPM | BODY MASS INDEX: 29.82 KG/M2 | DIASTOLIC BLOOD PRESSURE: 72 MMHG | HEIGHT: 73 IN | WEIGHT: 225 LBS | TEMPERATURE: 97.8 F | HEART RATE: 80 BPM

## 2024-04-08 DIAGNOSIS — D61.818 PANCYTOPENIA: ICD-10-CM

## 2024-04-08 DIAGNOSIS — D64.9 ANEMIA, UNSPECIFIED TYPE: ICD-10-CM

## 2024-04-08 DIAGNOSIS — R91.8 MULTIPLE LUNG NODULES ON CT: Primary | ICD-10-CM

## 2024-04-08 DIAGNOSIS — R91.8 MULTIPLE LUNG NODULES ON CT: ICD-10-CM

## 2024-04-08 DIAGNOSIS — D61.818 PANCYTOPENIA: Primary | ICD-10-CM

## 2024-04-08 LAB
ALBUMIN SERPL-MCNC: 4.4 G/DL (ref 3.5–5.2)
ALBUMIN/GLOB SERPL: 1.8 G/DL
ALP SERPL-CCNC: 88 U/L (ref 39–117)
ALT SERPL W P-5'-P-CCNC: 19 U/L (ref 1–41)
ANION GAP SERPL CALCULATED.3IONS-SCNC: 8 MMOL/L (ref 5–15)
AST SERPL-CCNC: 26 U/L (ref 1–40)
BASOPHILS # BLD AUTO: 0.01 10*3/MM3 (ref 0–0.2)
BASOPHILS NFR BLD AUTO: 0.5 % (ref 0–1.5)
BILIRUB SERPL-MCNC: 0.5 MG/DL (ref 0–1.2)
BUN SERPL-MCNC: 9 MG/DL (ref 8–23)
BUN/CREAT SERPL: 10.2 (ref 7–25)
CALCIUM SPEC-SCNC: 9.1 MG/DL (ref 8.6–10.5)
CEA SERPL-MCNC: 1.65 NG/ML
CHLORIDE SERPL-SCNC: 106 MMOL/L (ref 98–107)
CO2 SERPL-SCNC: 27 MMOL/L (ref 22–29)
CREAT SERPL-MCNC: 0.88 MG/DL (ref 0.76–1.27)
CYTOLOGIST CVX/VAG CYTO: NORMAL
DEPRECATED RDW RBC AUTO: 52.2 FL (ref 37–54)
EGFRCR SERPLBLD CKD-EPI 2021: 96.6 ML/MIN/1.73
EOSINOPHIL # BLD AUTO: 0.01 10*3/MM3 (ref 0–0.4)
EOSINOPHIL NFR BLD AUTO: 0.5 % (ref 0.3–6.2)
ERYTHROCYTE [DISTWIDTH] IN BLOOD BY AUTOMATED COUNT: 15.3 % (ref 12.3–15.4)
GLOBULIN UR ELPH-MCNC: 2.4 GM/DL
GLUCOSE SERPL-MCNC: 135 MG/DL (ref 65–99)
HCT VFR BLD AUTO: 37.4 % (ref 37.5–51)
HGB BLD-MCNC: 12.3 G/DL (ref 13–17.7)
LYMPHOCYTES # BLD AUTO: 0.7 10*3/MM3 (ref 0.7–3.1)
LYMPHOCYTES NFR BLD AUTO: 34.8 % (ref 19.6–45.3)
MCH RBC QN AUTO: 30.4 PG (ref 26.6–33)
MCHC RBC AUTO-ENTMCNC: 32.9 G/DL (ref 31.5–35.7)
MCV RBC AUTO: 92.3 FL (ref 79–97)
MONOCYTES # BLD AUTO: 0.45 10*3/MM3 (ref 0.1–0.9)
MONOCYTES NFR BLD AUTO: 22.4 % (ref 5–12)
NEUTROPHILS NFR BLD AUTO: 0.83 10*3/MM3 (ref 1.7–7)
NEUTROPHILS NFR BLD AUTO: 41.3 % (ref 42.7–76)
PATH INTERP BLD-IMP: NORMAL
PLATELET # BLD AUTO: 99 10*3/MM3 (ref 140–450)
PMV BLD AUTO: 10.4 FL (ref 6–12)
POTASSIUM SERPL-SCNC: 3.8 MMOL/L (ref 3.5–5.2)
PROT SERPL-MCNC: 6.8 G/DL (ref 6–8.5)
RBC # BLD AUTO: 4.05 10*6/MM3 (ref 4.14–5.8)
SODIUM SERPL-SCNC: 141 MMOL/L (ref 136–145)
WBC NRBC COR # BLD AUTO: 2.01 10*3/MM3 (ref 3.4–10.8)

## 2024-04-08 PROCEDURE — 85060 BLOOD SMEAR INTERPRETATION: CPT

## 2024-04-08 PROCEDURE — 36415 COLL VENOUS BLD VENIPUNCTURE: CPT

## 2024-04-08 PROCEDURE — 99214 OFFICE O/P EST MOD 30 MIN: CPT | Performed by: NURSE PRACTITIONER

## 2024-04-08 PROCEDURE — 82378 CARCINOEMBRYONIC ANTIGEN: CPT

## 2024-04-08 PROCEDURE — 80053 COMPREHEN METABOLIC PANEL: CPT

## 2024-04-08 PROCEDURE — 85025 COMPLETE CBC W/AUTO DIFF WBC: CPT

## 2024-04-08 NOTE — LETTER
April 8, 2024       No Recipients    Patient: Gregorio Callahan   YOB: 1960   Date of Visit: 4/8/2024     Dear Ajith Barrientos MD:       Thank you for referring Gregorio Callahan to me for evaluation. Below are the relevant portions of my assessment and plan of care.    If you have questions, please do not hesitate to call me. I look forward to following Gregorio along with you.         Sincerely,        CHRISTIANA Torres        CC:   No Recipients    Twila Wick APRN  04/08/24 1157  Sign when Signing Visit  [facility] HEMATOLOGY & ONCOLOGY  [address]  [place] [address] [phone]    Patient Name: Gregorio Callahan  Encounter Date: 04/08/2024   YOB: 1960  Patient Number: [id number]     PROGRESS NOTE   HISTORY OF PRESENT ILLNESS: Gregorio Callahan is a 64 y.o. male was referred for low WBC and low platelets.   History is considered to be accurate.    He has health history significant for HTN, CAD, Hyperlipidemia, Hx of MI (2007), Ex Tobacco user:  quit in 2007     He had colonoscopy 3/21/22:   - The entire examined colon is normal on direct and retroflexion views. No specimens collected.  10 year recall.      Drinks beer appox a case per month month    Lost 5 pounds in 6 weeks unintentionally  Reports cough, Night sweats for the past 4  weeks, light headed.    Pt was seen in consultation on 3/7/24.  Ordered CT Neck, Chest, Abdomen, Pelvis     CT Neck 3/15/24  1. No neck mass or evidence of lymphadenopathy. No acute findings.   2. Questionable cerebellar tonsillar ectopia and possible Chiari I malformation, partially obscured by streak artifact. Consider follow-up with MRI of the brain.   CT Chest 3/15/24  1. Innumerable bilateral groundglass and solid pulmonary nodules, some of which appear cavitary. This has a broad differential which would include septic emboli, metastatic disease, atypical infection (fungal  mycobacterial) or vasculitis.  2. Mild mediastinal adenopathy.  3. Borderline heart size with heavily calcified coronary arteries versus stent material.   4. Liver contour appears somewhat nodular. There may be mild splenomegaly. Correlate for chronic liver disease.  5. Indeterminate round sclerotic focus in the LEFT posterior fourth rib.  Recommend correlation with serum PSA and consider nuclear medicine bone scan if clinically indicated.    CT Abdomen 3/15/24  No acute findings in the abdomen/pelvis.    Minimal ectasia of the infrarenal abdominal aorta measuring up to 2.6 cm.    Per radiologist's recommendation, pt went to ER 3/16/24  to rule out septic emboli.  Preliminary blood culture negative.  Weight is stable.  Urgency and headaches have improved.    Still has fatigue and shortness of breath     INTERVAL HISTORY     Pt presents to clinic today for continued follow up.     FISH for MDS negative.  SPEP with no M Ángel.  Flow Cytometry showed increased monocytes 25% of leukocytes. Elevated monocytes could be related to decreased neutrophils at [facility] has been normal .    PET Scan 3/27/24  1.  Innumerable groundglass nodules and surrounding groundglass opacity in the lungs persist and exhibit low-grade for uptake. These may be infectious or neoplastic. These are likely too small for percutaneous biopsy.  2.  Multiple enlarged mediastinal and bilateral hilar lymph nodes are more intensely hypermetabolic. These could potentially be biopsied under EBUS guidance. These may also be infectious/reactive and short interval follow-up could be considered.  3.  Hypermetabolic lymph node at the level of the josie splenic vein confluence.    Pt has been referred to pulmonology.  He is scheduled to see Dr. Fowler April 17, 2024.     He has no acute complaints today.  He had labs drawn and results were reviewed with him in office.          LABS    Lab Results - Last 18 Months   Lab Units 04/08/24  0751 03/19/24  5990  03/16/24  1402 03/07/24  1253 02/01/24  0717 08/01/23  0707 01/26/23  0712 01/26/23  0712   HEMOGLOBIN g/dL 12.3* 12.2* 12.3* 12.2* 13.2 13.2  --  13.9   HEMATOCRIT % 37.4* 37.1* 37.5 37.3* 39.5 39.7  --  40.6   MCV fL 92.3 91.8 93.1 92.1 95.0 94.1  --  90.4   WBC 10*3/mm3 2.01* 3.05* 2.81* 3.64 2.59* 3.48  --  3.21*   RDW % 15.3 15.4 14.7 14.7 14.4 13.4  --  12.6   MPV fL 10.4 10.0 10.3 9.8  --   --   --   --    PLATELETS 10*3/mm3 99* 141 157 163 94* 103*  --  108*   IMM GRAN % %  --   --  0.7*  --   --   --   --   --    NEUTROS ABS 10*3/mm3 0.83* 1.26* 1.46* 1.86 0.49* 1.49*  --  1.52*   LYMPHS ABS 10*3/mm3 0.70  --  0.85  --  CANCELED  1.11 1.28  --  CANCELED  1.06   MONOS ABS 10*3/mm3 0.45  --  0.46  --  0.96* 0.64  --  0.56   EOS ABS 10*3/mm3 0.01  --  0.01 0.07 CANCELED  --   --  CANCELED   EOSINOPHIL ABS 10*3/mm3  --   --   --   --  0.03 0.07  --   --    EOSINOPHIL % %  --   --   --   --  1.1 2.0   < >  --    BASOS ABS 10*3/mm3 0.01  --  0.01  --  CANCELED  --   --  CANCELED  0.07   IMMATURE GRANS (ABS) 10*3/mm3  --   --  0.02  --   --   --   --   --    NRBC /100 WBC  --   --  0.0 1.0*  --   --   --   --    NEUTROPHIL % %  --  41.4*  --  46.0 19.1* 42.9  --  47.3   MONOCYTES % %  --  25.3*  --  26.0* 37.1* 18.4*  --  17.6*   BASOPHIL % %  --   --   --   --   --   --   --  2.2*   ATYP LYMPH % %  --  15.2*  --  2.0  --   --   --   --    ANISOCYTOSIS   --  Slight/1+  --  Slight/1+  --   --   --   --    GIANT PLT   --   --   --  Slight/1+  --   --   --   --     < > = values in this interval not displayed.       Lab Results - Last 18 Months   Lab Units 04/08/24  0751 03/19/24  0727 03/16/24  1402 03/15/24  1626 03/07/24  1253 02/01/24  0717 08/01/23  0707   GLUCOSE mg/dL 135* 76 125*  --  85 88 96   SODIUM mmol/L 141 139 140  --  139 138 146*   POTASSIUM mmol/L 3.8 4.3 4.1  --  4.6 4.5 5.1   CO2 mmol/L 27.0 29.0 28.0  --  27.0 27.2 27.9   CHLORIDE mmol/L 106 101 105  --  100 104 108*   ANION GAP mmol/L 8.0 9.0  7.0  --  12.0  --   --    CREATININE mg/dL 0.88 0.82 0.79 1.30 0.90 1.01 0.91   BUN mg/dL 9 11 13  --  13 16 14   BUN / CREAT RATIO  10.2 13.4 16.5  --  14.4 15.8 15.4   CALCIUM mg/dL 9.1 9.3 9.3  --  9.1 9.3 9.5   ALK PHOS U/L 88 88 110  --  88 91 95   TOTAL PROTEIN g/dL 6.8 7.2 7.4  --  7.8  --   --    ALT (SGPT) U/L 19 32 35  --  65* 33 22   AST (SGOT) U/L 26 28 29  --  52* 38 27   BILIRUBIN mg/dL 0.5 0.5 0.3  --  0.7 0.9 0.5   ALBUMIN g/dL 4.4 4.3  3.8 4.4  --  4.4 4.9 4.4   GLOBULIN gm/dL 2.4 2.9 3.0  --  3.4  --   --        Lab Results - Last 18 Months   Lab Units 03/19/24  0727 03/07/24  1253   M-SPIKE g/dL Not Observed  --    KAPPA/LAMBDA RATIO, S  1.56  --    FREE LAMBDA LIGHT CHAINS mg/L 15.8  --    IG KAPPA FREE LIGHT CHAIN mg/L 24.7*  --    LDH U/L 365* 492*   REFERENCE LAB REPORT   --  See Attached Report       Lab Results - Last 18 Months   Lab Units 03/19/24  0727 03/07/24  1253 02/01/24  0717 08/01/23  0707 01/26/23  0712   IRON mcg/dL 54* 33*  --   --   --    TIBC mcg/dL 329 288*  --   --   --    IRON SATURATION (TSAT) % 16* 11*  --   --   --    FERRITIN ng/mL 644.80* 1,226.00*  --   --   --    TSH uIU/mL  --   --  2.180 2.700 2.230   FOLATE ng/mL  --  15.10  --   --   --          PAST MEDICAL HISTORY:  ALLERGIES:  No Known Allergies  CURRENT MEDICATIONS:  Outpatient Encounter Medications as of 4/8/2024   Medication Sig Dispense Refill   • aspirin 81 MG EC tablet Take 1 tablet by mouth Daily.     • atorvastatin (LIPITOR) 80 MG tablet Take 1 tablet by mouth Daily. 90 tablet 3   • citalopram (CeleXA) 40 MG tablet TAKE 1 TABLET BY MOUTH EVERY DAY 90 tablet 3   • ezetimibe (ZETIA) 10 MG tablet Take 1 tablet by mouth Daily for 360 days. 90 tablet 3   • lisinopril (PRINIVIL,ZESTRIL) 5 MG tablet Take 1 tablet by mouth Daily. 90 tablet 3   • nitroglycerin (NITROSTAT) 0.4 MG SL tablet TAKE 1 TABLET BY MOUTH EVERY 5 MINUTES UP TO 3 TIMES AS NEEDED FOR CHEST PAIN. 25 tablet 2   • traZODone (DESYREL) 50 MG  tablet TAKE 1 OR 2 TABLETS BY MOUTH AT BEDTIME AS NEEDED 180 tablet 3   • vitamin B-12 (CYANOCOBALAMIN) 1000 MCG tablet Take 1 tablet by mouth Daily.       No facility-administered encounter medications on file as of 4/8/2024.     ADULT ILLNESSES:  Patient Active Problem List   Diagnosis Code   • Hyperlipidemia, mild E78.5   • Benign essential hypertension I10   • CAD in native artery I25.10   • H/O acute myocardial infarction I25.2   • History of PTCA Z98.61   • SZYMANSKI (dyspnea on exertion) R06.09   • Encounter for screening for malignant neoplasm of colon Z12.11       HEALTH MAINTENANCE ITEMS:  Health Maintenance Due   Topic Date Due   • RSV Vaccine - Adults (1 - 1-dose 60+ series) Never done       <no information>  Last Completed Colonoscopy            COLORECTAL CANCER SCREENING (COLONOSCOPY - Every 10 Years) Next due on 3/21/2032      03/14/2024  Occult Blood X 3, Stool - Stool, Per Rectum    03/21/2022  Surgical Procedure: COLONOSCOPY    03/21/2022  COLONOSCOPY    10/23/2018  Cologuard - Stool, Per Rectum    04/05/2011  COLONOSCOPY (Done)    Only the first 5 history entries have been loaded, but more history exists.                  Immunization History   Administered Date(s) Administered   • COVID-19 (MODERNA) 1st,2nd,3rd Dose Monovalent 03/16/2021, 04/13/2021, 11/11/2021   • COVID-19 (PFIZER) BIVALENT 12+YRS 10/25/2022   • COVID-19 F23 (PFIZER) 12YRS+ (COMIRNATY) 10/19/2023   • Flu Vaccine Intradermal Quad 18-64YR 10/18/2017   • Flu Vaccine Quad PF >36MO 11/12/2016   • Fluad Quad 65+ 08/28/2020   • Fluzone (or Fluarix & Flulaval for VFC) >6mos 10/25/2021, 10/25/2022, 10/11/2023   • Pneumococcal Conjugate 13-Valent (PCV13) 08/10/2016   • Pneumococcal Polysaccharide (PPSV23) 10/23/2018   • Shingrix 11/18/2019, 07/16/2020   • Tdap 10/18/2017   • flucelvax quad pfs =>4 YRS 10/23/2018, 11/12/2019     Last Completed Mammogram       This patient has no relevant Health Maintenance data.              FAMILY  HISTORY:  Family History   Problem Relation Age of Onset   • Heart disease Mother    • Alzheimer's disease Father    • Heart attack Sister    • Heart attack Brother    • No Known Problems Maternal Grandmother    • No Known Problems Maternal Grandfather    • No Known Problems Paternal Grandmother    • No Known Problems Paternal Grandfather    • Colon cancer Neg Hx    • Colon polyps Neg Hx      SOCIAL HISTORY:  Social History     Socioeconomic History   • Marital status:    Tobacco Use   • Smoking status: Former     Current packs/day: 0.00     Average packs/day: 3.0 packs/day for 30.0 years (90.0 ttl pk-yrs)     Types: Cigarettes     Start date: 1977     Quit date: 2007     Years since quittin.2   • Smokeless tobacco: Never   • Tobacco comments:     age quit smokin   Vaping Use   • Vaping status: Never Used   Substance and Sexual Activity   • Alcohol use: Yes     Comment: Occasional alcohol use. Drinks beer.   • Drug use: No   • Sexual activity: Defer       REVIEW OF SYSTEMS:  Review of Systems   Constitutional:  Positive for fatigue and unexpected weight loss (5 pounds in 4 weeks, pt was on vacation and ate welll). Negative for activity change, appetite change, fever and unexpected weight gain.   HENT:  Negative for dental problem, facial swelling, swollen glands and trouble swallowing.    Eyes:  Negative for double vision and discharge.   Respiratory:  Positive for shortness of breath (Chronic but worsening). Negative for cough and wheezing.    Cardiovascular:  Negative for chest pain, palpitations and leg swelling.   Gastrointestinal:  Negative for abdominal pain, blood in stool, nausea and vomiting.   Endocrine: Negative.    Genitourinary:  Positive for urgency (Acute) and urinary incontinence. Negative for dysuria and hematuria.   Musculoskeletal:  Negative for arthralgias and myalgias.   Skin:  Negative for rash, skin lesions and wound.   Allergic/Immunologic: Negative for  "immunocompromised state.   Neurological:  Positive for headache (intermittent, rapid pain to right side of head). Negative for speech difficulty, light-headedness, memory problem and confusion.   Hematological:  Negative for adenopathy.   Psychiatric/Behavioral:  Negative for self-injury, suicidal ideas and depressed mood. The patient is not nervous/anxious.        /72   Pulse 80   Temp 97.8 °F (36.6 °C)   Resp 18   Ht 185.4 cm (73\")   Wt 102 kg (225 lb)   SpO2 95%   BMI 29.69 kg/m²  Body surface area is 2.26 meters squared.    Pain Score    04/08/24 0816   PainSc: 0-No pain        Physical Exam  Constitutional:       Appearance: Normal appearance.   HENT:      Head: Normocephalic and atraumatic.   Cardiovascular:      Rate and Rhythm: Normal rate and regular rhythm.   Pulmonary:      Effort: Pulmonary effort is normal.      Breath sounds: Normal breath sounds.   Abdominal:      General: Bowel sounds are normal.      Palpations: Abdomen is soft.   Musculoskeletal:      Right lower leg: No edema.      Left lower leg: No edema.   Skin:     General: Skin is warm and dry.   Neurological:      Mental Status: He is alert and oriented to person, place, and time.   Psychiatric:         Attention and Perception: Attention normal.         Mood and Affect: Mood normal.         Judgment: Judgment normal.         Gregorio Callahan reports a pain score of 0.  Given his pain assessment as noted, treatment options were discussed and the following options were decided upon as a follow-up plan to address the patient's pain:  no intervention indicated .      ASSESSMENT / PLAN:    1. Multiple lung nodules on CT    2. Pancytopenia      1.  Lung Nodules on CT Scan   CT Chest 3/15/24  1. Innumerable bilateral groundglass and solid pulmonary nodules, some of which appear cavitary. This has a broad differential which would include septic emboli, metastatic disease, atypical infection (fungal mycobacterial) or vasculitis.  2. Mild " mediastinal adenopathy.  3. Borderline heart size with heavily calcified coronary arteries versus stent material.   4. Liver contour appears somewhat nodular. There may be mild splenomegaly. Correlate for chronic liver disease.  5. Indeterminate round sclerotic focus in the LEFT posterior fourth rib.  Recommend correlation with serum PSA and consider nuclear medicine bone scan if clinically indicated.   PET Scan 3/27/24  1.  Innumerable groundglass nodules and surrounding groundglass opacity in the lungs persist and exhibit low-grade for uptake. These may be infectious or neoplastic. These are likely too small for percutaneous biopsy.  2.  Multiple enlarged mediastinal and bilateral hilar lymph nodes are more intensely hypermetabolic. These could potentially be biopsied under EBUS guidance. These may also be infectious/reactive and short interval follow-up could be considered.  3.  Hypermetabolic lymph node at the level of the josie splenic vein confluence.    Pt has been referred to pulmonology.  He is scheduled to see Dr. Fowler April 17, 2024.   Quit smoking 17 years ago  -Added CEA to labs      2.  Pancytopenia  -Labs today:  WBC 2.01, Hgb 12.3, Hct 37.4, Plt 44  -3/18/24 labs:  Iron 54, Ferritin 644, Sat 16%, TIBC 329  -Iron deficiency r/t chronic disease/inflammation..?  Meds at time of Consultation   Lisinopril:  Bone marrow depression, eosinophilia, hemolytic anemia, increased erythrocyte sedimentation rate, leukocytosis, leukopenia, neutropenia, positive LILIANA titer, pseudolymphoma (cutaneous), thrombocytopenia  Celexa: Anemia, disorder of hemostatic components of blood, hypochromic anemia, leukocytosis, leukopenia, lymphadenopathy, lymphocytopenia, lymphocytosis, purpuric disease  Zetia (ezetimibe): Thrombocytopenia  Trazodone: Anemia, hemolytic anemia, leukocytosis, methemoglobinemia  Viagra: anemia and hematuria  -CT Abdomen/Pelvis on 3/15/24 showed unremarkable liver and spleen   -Discussed with patient  and wife the potential need for bone marrow biopsy   -Discussed case with Dr. Cortez who agrees we should proceed with bone marrow      5.  Elevated LDH  6.  Elevated B2M  -Labs 3/19/24  -  -B2M 3.4  -No M Ángel, No monoclonality    PLAN:  Pt will see Pulmonology, Dr. Fowler, on April 17, 2024  Will order bone marrow biopsy r/t pancytopenia   If any malignancy is identified, will schedule pt to see Dr. Cortez  Advised pt of neutropenic precautions   Continue current medications, treatment plans and follow up with PCP and any other providers  Return to office based on results of bone marrow and recommendations of pulmonology.    Care discussed with patient.  Understanding expressed.  Patient agreeable with plan.         Twila Wick, APRN  04/08/2024

## 2024-04-08 NOTE — PROGRESS NOTES
[facility] GROUP HEMATOLOGY & ONCOLOGY  [address]  [place] [address] [phone]    Patient Name: Gregorio Callahan  Encounter Date: 04/08/2024   YOB: 1960  Patient Number: [id number]     PROGRESS NOTE   HISTORY OF PRESENT ILLNESS: Gregorio Callahan is a 64 y.o. male was referred for low WBC and low platelets.   History is considered to be accurate.    He has health history significant for HTN, CAD, Hyperlipidemia, Hx of MI (2007), Ex Tobacco user:  quit in 2007     He had colonoscopy 3/21/22:   - The entire examined colon is normal on direct and retroflexion views. No specimens collected.  10 year recall.      Drinks beer appox a case per month month    Lost 5 pounds in 6 weeks unintentionally  Reports cough, Night sweats for the past 4  weeks, light headed.    Pt was seen in consultation on 3/7/24.  Ordered CT Neck, Chest, Abdomen, Pelvis     CT Neck 3/15/24  1. No neck mass or evidence of lymphadenopathy. No acute findings.   2. Questionable cerebellar tonsillar ectopia and possible Chiari I malformation, partially obscured by streak artifact. Consider follow-up with MRI of the brain.   CT Chest 3/15/24  1. Innumerable bilateral groundglass and solid pulmonary nodules, some of which appear cavitary. This has a broad differential which would include septic emboli, metastatic disease, atypical infection (fungal mycobacterial) or vasculitis.  2. Mild mediastinal adenopathy.  3. Borderline heart size with heavily calcified coronary arteries versus stent material.   4. Liver contour appears somewhat nodular. There may be mild splenomegaly. Correlate for chronic liver disease.  5. Indeterminate round sclerotic focus in the LEFT posterior fourth rib.  Recommend correlation with serum PSA and consider nuclear medicine bone scan if clinically indicated.    CT Abdomen 3/15/24  No acute findings in the abdomen/pelvis.    Minimal ectasia of the  infrarenal abdominal aorta measuring up to 2.6 cm.    Per radiologist's recommendation, pt went to ER 3/16/24  to rule out septic emboli.  Preliminary blood culture negative.  Weight is stable.  Urgency and headaches have improved.    Still has fatigue and shortness of breath     INTERVAL HISTORY     Pt presents to clinic today for continued follow up.     FISH for MDS negative.  SPEP with no M Ángel.  Flow Cytometry showed increased monocytes 25% of leukocytes. Elevated monocytes could be related to decreased neutrophils at [facility] has been normal .    PET Scan 3/27/24  1.  Innumerable groundglass nodules and surrounding groundglass opacity in the lungs persist and exhibit low-grade for uptake. These may be infectious or neoplastic. These are likely too small for percutaneous biopsy.  2.  Multiple enlarged mediastinal and bilateral hilar lymph nodes are more intensely hypermetabolic. These could potentially be biopsied under EBUS guidance. These may also be infectious/reactive and short interval follow-up could be considered.  3.  Hypermetabolic lymph node at the level of the josie splenic vein confluence.    Pt has been referred to pulmonology.  He is scheduled to see Dr. Fowler April 17, 2024.     He has no acute complaints today.  He had labs drawn and results were reviewed with him in office.          LABS    Lab Results - Last 18 Months   Lab Units 04/08/24  0751 03/19/24  0727 03/16/24  1402 03/07/24  1253 02/01/24  0717 08/01/23  0707 01/26/23  0712 01/26/23  0712   HEMOGLOBIN g/dL 12.3* 12.2* 12.3* 12.2* 13.2 13.2  --  13.9   HEMATOCRIT % 37.4* 37.1* 37.5 37.3* 39.5 39.7  --  40.6   MCV fL 92.3 91.8 93.1 92.1 95.0 94.1  --  90.4   WBC 10*3/mm3 2.01* 3.05* 2.81* 3.64 2.59* 3.48  --  3.21*   RDW % 15.3 15.4 14.7 14.7 14.4 13.4  --  12.6   MPV fL 10.4 10.0 10.3 9.8  --   --   --   --    PLATELETS 10*3/mm3 99* 141 157 163 94* 103*  --  108*   IMM GRAN % %  --   --  0.7*  --   --   --   --   --    NEUTROS ABS  10*3/mm3 0.83* 1.26* 1.46* 1.86 0.49* 1.49*  --  1.52*   LYMPHS ABS 10*3/mm3 0.70  --  0.85  --  CANCELED  1.11 1.28  --  CANCELED  1.06   MONOS ABS 10*3/mm3 0.45  --  0.46  --  0.96* 0.64  --  0.56   EOS ABS 10*3/mm3 0.01  --  0.01 0.07 CANCELED  --   --  CANCELED   EOSINOPHIL ABS 10*3/mm3  --   --   --   --  0.03 0.07  --   --    EOSINOPHIL % %  --   --   --   --  1.1 2.0   < >  --    BASOS ABS 10*3/mm3 0.01  --  0.01  --  CANCELED  --   --  CANCELED  0.07   IMMATURE GRANS (ABS) 10*3/mm3  --   --  0.02  --   --   --   --   --    NRBC /100 WBC  --   --  0.0 1.0*  --   --   --   --    NEUTROPHIL % %  --  41.4*  --  46.0 19.1* 42.9  --  47.3   MONOCYTES % %  --  25.3*  --  26.0* 37.1* 18.4*  --  17.6*   BASOPHIL % %  --   --   --   --   --   --   --  2.2*   ATYP LYMPH % %  --  15.2*  --  2.0  --   --   --   --    ANISOCYTOSIS   --  Slight/1+  --  Slight/1+  --   --   --   --    GIANT PLT   --   --   --  Slight/1+  --   --   --   --     < > = values in this interval not displayed.       Lab Results - Last 18 Months   Lab Units 04/08/24  0751 03/19/24  0727 03/16/24  1402 03/15/24  1626 03/07/24  1253 02/01/24  0717 08/01/23  0707   GLUCOSE mg/dL 135* 76 125*  --  85 88 96   SODIUM mmol/L 141 139 140  --  139 138 146*   POTASSIUM mmol/L 3.8 4.3 4.1  --  4.6 4.5 5.1   CO2 mmol/L 27.0 29.0 28.0  --  27.0 27.2 27.9   CHLORIDE mmol/L 106 101 105  --  100 104 108*   ANION GAP mmol/L 8.0 9.0 7.0  --  12.0  --   --    CREATININE mg/dL 0.88 0.82 0.79 1.30 0.90 1.01 0.91   BUN mg/dL 9 11 13  --  13 16 14   BUN / CREAT RATIO  10.2 13.4 16.5  --  14.4 15.8 15.4   CALCIUM mg/dL 9.1 9.3 9.3  --  9.1 9.3 9.5   ALK PHOS U/L 88 88 110  --  88 91 95   TOTAL PROTEIN g/dL 6.8 7.2 7.4  --  7.8  --   --    ALT (SGPT) U/L 19 32 35  --  65* 33 22   AST (SGOT) U/L 26 28 29  --  52* 38 27   BILIRUBIN mg/dL 0.5 0.5 0.3  --  0.7 0.9 0.5   ALBUMIN g/dL 4.4 4.3  3.8 4.4  --  4.4 4.9 4.4   GLOBULIN gm/dL 2.4 2.9 3.0  --  3.4  --   --        Lab  Results - Last 18 Months   Lab Units 03/19/24  0727 03/07/24  1253   M-SPIKE g/dL Not Observed  --    KAPPA/LAMBDA RATIO, S  1.56  --    FREE LAMBDA LIGHT CHAINS mg/L 15.8  --    IG KAPPA FREE LIGHT CHAIN mg/L 24.7*  --    LDH U/L 365* 492*   REFERENCE LAB REPORT   --  See Attached Report       Lab Results - Last 18 Months   Lab Units 03/19/24  0727 03/07/24  1253 02/01/24  0717 08/01/23  0707 01/26/23  0712   IRON mcg/dL 54* 33*  --   --   --    TIBC mcg/dL 329 288*  --   --   --    IRON SATURATION (TSAT) % 16* 11*  --   --   --    FERRITIN ng/mL 644.80* 1,226.00*  --   --   --    TSH uIU/mL  --   --  2.180 2.700 2.230   FOLATE ng/mL  --  15.10  --   --   --          PAST MEDICAL HISTORY:  ALLERGIES:  No Known Allergies  CURRENT MEDICATIONS:  Outpatient Encounter Medications as of 4/8/2024   Medication Sig Dispense Refill    aspirin 81 MG EC tablet Take 1 tablet by mouth Daily.      atorvastatin (LIPITOR) 80 MG tablet Take 1 tablet by mouth Daily. 90 tablet 3    citalopram (CeleXA) 40 MG tablet TAKE 1 TABLET BY MOUTH EVERY DAY 90 tablet 3    ezetimibe (ZETIA) 10 MG tablet Take 1 tablet by mouth Daily for 360 days. 90 tablet 3    lisinopril (PRINIVIL,ZESTRIL) 5 MG tablet Take 1 tablet by mouth Daily. 90 tablet 3    nitroglycerin (NITROSTAT) 0.4 MG SL tablet TAKE 1 TABLET BY MOUTH EVERY 5 MINUTES UP TO 3 TIMES AS NEEDED FOR CHEST PAIN. 25 tablet 2    traZODone (DESYREL) 50 MG tablet TAKE 1 OR 2 TABLETS BY MOUTH AT BEDTIME AS NEEDED 180 tablet 3    vitamin B-12 (CYANOCOBALAMIN) 1000 MCG tablet Take 1 tablet by mouth Daily.       No facility-administered encounter medications on file as of 4/8/2024.     ADULT ILLNESSES:  Patient Active Problem List   Diagnosis Code    Hyperlipidemia, mild E78.5    Benign essential hypertension I10    CAD in native artery I25.10    H/O acute myocardial infarction I25.2    History of PTCA Z98.61    SZYMANSKI (dyspnea on exertion) R06.09    Encounter for screening for malignant neoplasm of  colon Z12.11       HEALTH MAINTENANCE ITEMS:  Health Maintenance Due   Topic Date Due    RSV Vaccine - Adults (1 - 1-dose 60+ series) Never done       <no information>  Last Completed Colonoscopy            COLORECTAL CANCER SCREENING (COLONOSCOPY - Every 10 Years) Next due on 3/21/2032      03/14/2024  Occult Blood X 3, Stool - Stool, Per Rectum    03/21/2022  Surgical Procedure: COLONOSCOPY    03/21/2022  COLONOSCOPY    10/23/2018  Cologuard - Stool, Per Rectum    04/05/2011  COLONOSCOPY (Done)    Only the first 5 history entries have been loaded, but more history exists.                  Immunization History   Administered Date(s) Administered    COVID-19 (MODERNA) 1st,2nd,3rd Dose Monovalent 03/16/2021, 04/13/2021, 11/11/2021    COVID-19 (PFIZER) BIVALENT 12+YRS 10/25/2022    COVID-19 F23 (PFIZER) 12YRS+ (COMIRNATY) 10/19/2023    Flu Vaccine Intradermal Quad 18-64YR 10/18/2017    Flu Vaccine Quad PF >36MO 11/12/2016    Fluad Quad 65+ 08/28/2020    Fluzone (or Fluarix & Flulaval for VFC) >6mos 10/25/2021, 10/25/2022, 10/11/2023    Pneumococcal Conjugate 13-Valent (PCV13) 08/10/2016    Pneumococcal Polysaccharide (PPSV23) 10/23/2018    Shingrix 11/18/2019, 07/16/2020    Tdap 10/18/2017    flucelvax quad pfs =>4 YRS 10/23/2018, 11/12/2019     Last Completed Mammogram       This patient has no relevant Health Maintenance data.              FAMILY HISTORY:  Family History   Problem Relation Age of Onset    Heart disease Mother     Alzheimer's disease Father     Heart attack Sister     Heart attack Brother     No Known Problems Maternal Grandmother     No Known Problems Maternal Grandfather     No Known Problems Paternal Grandmother     No Known Problems Paternal Grandfather     Colon cancer Neg Hx     Colon polyps Neg Hx      SOCIAL HISTORY:  Social History     Socioeconomic History    Marital status:    Tobacco Use    Smoking status: Former     Current packs/day: 0.00     Average packs/day: 3.0 packs/day for  "30.0 years (90.0 ttl pk-yrs)     Types: Cigarettes     Start date: 1977     Quit date: 2007     Years since quittin.2    Smokeless tobacco: Never    Tobacco comments:     age quit smokin   Vaping Use    Vaping status: Never Used   Substance and Sexual Activity    Alcohol use: Yes     Comment: Occasional alcohol use. Drinks beer.    Drug use: No    Sexual activity: Defer       REVIEW OF SYSTEMS:  Review of Systems   Constitutional:  Positive for fatigue and unexpected weight loss (5 pounds in 4 weeks, pt was on vacation and ate welll). Negative for activity change, appetite change, fever and unexpected weight gain.   HENT:  Negative for dental problem, facial swelling, swollen glands and trouble swallowing.    Eyes:  Negative for double vision and discharge.   Respiratory:  Positive for shortness of breath (Chronic but worsening). Negative for cough and wheezing.    Cardiovascular:  Negative for chest pain, palpitations and leg swelling.   Gastrointestinal:  Negative for abdominal pain, blood in stool, nausea and vomiting.   Endocrine: Negative.    Genitourinary:  Positive for urgency (Acute) and urinary incontinence. Negative for dysuria and hematuria.   Musculoskeletal:  Negative for arthralgias and myalgias.   Skin:  Negative for rash, skin lesions and wound.   Allergic/Immunologic: Negative for immunocompromised state.   Neurological:  Positive for headache (intermittent, rapid pain to right side of head). Negative for speech difficulty, light-headedness, memory problem and confusion.   Hematological:  Negative for adenopathy.   Psychiatric/Behavioral:  Negative for self-injury, suicidal ideas and depressed mood. The patient is not nervous/anxious.        /72   Pulse 80   Temp 97.8 °F (36.6 °C)   Resp 18   Ht 185.4 cm (73\")   Wt 102 kg (225 lb)   SpO2 95%   BMI 29.69 kg/m²  Body surface area is 2.26 meters squared.    Pain Score    24 0816   PainSc: 0-No pain        Physical " Exam  Constitutional:       Appearance: Normal appearance.   HENT:      Head: Normocephalic and atraumatic.   Cardiovascular:      Rate and Rhythm: Normal rate and regular rhythm.   Pulmonary:      Effort: Pulmonary effort is normal.      Breath sounds: Normal breath sounds.   Abdominal:      General: Bowel sounds are normal.      Palpations: Abdomen is soft.   Musculoskeletal:      Right lower leg: No edema.      Left lower leg: No edema.   Skin:     General: Skin is warm and dry.   Neurological:      Mental Status: He is alert and oriented to person, place, and time.   Psychiatric:         Attention and Perception: Attention normal.         Mood and Affect: Mood normal.         Judgment: Judgment normal.         Gregorio Callahan reports a pain score of 0.  Given his pain assessment as noted, treatment options were discussed and the following options were decided upon as a follow-up plan to address the patient's pain:  no intervention indicated .      ASSESSMENT / PLAN:    1. Multiple lung nodules on CT    2. Pancytopenia      1.  Lung Nodules on CT Scan   CT Chest 3/15/24  1. Innumerable bilateral groundglass and solid pulmonary nodules, some of which appear cavitary. This has a broad differential which would include septic emboli, metastatic disease, atypical infection (fungal mycobacterial) or vasculitis.  2. Mild mediastinal adenopathy.  3. Borderline heart size with heavily calcified coronary arteries versus stent material.   4. Liver contour appears somewhat nodular. There may be mild splenomegaly. Correlate for chronic liver disease.  5. Indeterminate round sclerotic focus in the LEFT posterior fourth rib.  Recommend correlation with serum PSA and consider nuclear medicine bone scan if clinically indicated.   PET Scan 3/27/24  1.  Innumerable groundglass nodules and surrounding groundglass opacity in the lungs persist and exhibit low-grade for uptake. These may be infectious or neoplastic. These are likely too  small for percutaneous biopsy.  2.  Multiple enlarged mediastinal and bilateral hilar lymph nodes are more intensely hypermetabolic. These could potentially be biopsied under EBUS guidance. These may also be infectious/reactive and short interval follow-up could be considered.  3.  Hypermetabolic lymph node at the level of the josie splenic vein confluence.    Pt has been referred to pulmonology.  He is scheduled to see Dr. Fowler April 17, 2024.   Quit smoking 17 years ago  -Added CEA to labs      2.  Pancytopenia  -Labs today:  WBC 2.01, Hgb 12.3, Hct 37.4, Plt 44  -3/18/24 labs:  Iron 54, Ferritin 644, Sat 16%, TIBC 329  -Iron deficiency r/t chronic disease/inflammation..?  Meds at time of Consultation   Lisinopril:  Bone marrow depression, eosinophilia, hemolytic anemia, increased erythrocyte sedimentation rate, leukocytosis, leukopenia, neutropenia, positive LILIANA titer, pseudolymphoma (cutaneous), thrombocytopenia  Celexa: Anemia, disorder of hemostatic components of blood, hypochromic anemia, leukocytosis, leukopenia, lymphadenopathy, lymphocytopenia, lymphocytosis, purpuric disease  Zetia (ezetimibe): Thrombocytopenia  Trazodone: Anemia, hemolytic anemia, leukocytosis, methemoglobinemia  Viagra: anemia and hematuria  -CT Abdomen/Pelvis on 3/15/24 showed unremarkable liver and spleen   -Discussed with patient and wife the potential need for bone marrow biopsy   -Discussed case with Dr. Cortez who agrees we should proceed with bone marrow      5.  Elevated LDH  6.  Elevated B2M  -Labs 3/19/24  -  -B2M 3.4  -No M Ángel, No monoclonality    PLAN:  Pt will see Pulmonology, Dr. Fowler, on April 17, 2024  Will order bone marrow biopsy r/t pancytopenia   If any malignancy is identified, will schedule pt to see Dr. Cortez  Advised pt of neutropenic precautions   Continue current medications, treatment plans and follow up with PCP and any other providers  Return to office based on results of bone marrow and  recommendations of pulmonology.    Care discussed with patient.  Understanding expressed.  Patient agreeable with plan.         Twila Wick, CHRISTIANA  04/08/2024

## 2024-04-15 ENCOUNTER — TELEPHONE (OUTPATIENT)
Dept: ONCOLOGY | Facility: CLINIC | Age: 64
End: 2024-04-15
Payer: COMMERCIAL

## 2024-04-15 NOTE — TELEPHONE ENCOUNTER
Caller: Kamron Callahan    Relationship: Self    Best call back number: [phone]    What is the best time to reach you: ANY    Who are you requesting to speak with (clinical staff, provider,  specific staff member): CLINICAL     What was the call regarding: KAMRON WAS SEEN ON 4-8.  DON WAS GOING TO SCHEDULE bone marrow biopsy r/t pancytopenia     KAMRON HAS NOT HEARD ANYTHING SO HE WAS CHECKING BACK TO SEE IF HE CAN GET THAT SCHEDULED       PLEASE ADVISE     Is it okay if the provider responds through MyChart: NO

## 2024-04-17 ENCOUNTER — OFFICE VISIT (OUTPATIENT)
Dept: PULMONOLOGY | Facility: CLINIC | Age: 64
End: 2024-04-17
Payer: COMMERCIAL

## 2024-04-17 ENCOUNTER — PREP FOR SURGERY (OUTPATIENT)
Dept: OTHER | Facility: HOSPITAL | Age: 64
End: 2024-04-17
Payer: COMMERCIAL

## 2024-04-17 VITALS
WEIGHT: 223 LBS | HEIGHT: 73 IN | OXYGEN SATURATION: 95 % | DIASTOLIC BLOOD PRESSURE: 80 MMHG | BODY MASS INDEX: 29.55 KG/M2 | HEART RATE: 66 BPM | SYSTOLIC BLOOD PRESSURE: 136 MMHG

## 2024-04-17 DIAGNOSIS — R91.8 MULTIPLE LUNG NODULES: Primary | ICD-10-CM

## 2024-04-17 DIAGNOSIS — R59.0 MEDIASTINAL ADENOPATHY: ICD-10-CM

## 2024-04-17 DIAGNOSIS — D61.818 PANCYTOPENIA: Primary | ICD-10-CM

## 2024-04-17 RX ORDER — SODIUM CHLORIDE 0.9 % (FLUSH) 0.9 %
10 SYRINGE (ML) INJECTION AS NEEDED
OUTPATIENT
Start: 2024-04-17

## 2024-04-17 RX ORDER — SODIUM CHLORIDE 0.9 % (FLUSH) 0.9 %
10 SYRINGE (ML) INJECTION EVERY 12 HOURS SCHEDULED
OUTPATIENT
Start: 2024-04-17

## 2024-04-17 RX ORDER — SODIUM CHLORIDE 9 MG/ML
40 INJECTION, SOLUTION INTRAVENOUS AS NEEDED
OUTPATIENT
Start: 2024-04-17

## 2024-04-17 NOTE — H&P
Prabhakar Xiao, [facility] Medical Assistant [id number]     Copy     Expand All Collapse All  Background:  Pt w ggo,and ggn, mlad    Chief Complaint  mutiple lung nodules        Subjective  History of Present Illness    Gregorio Callahan presents to [facility] PULMONARY & CRITICAL CARE MEDICINE.  History of Present Illness  He was evaluated in February, he had low blood counts, then further evaluation identified nodules and mediastinal adenopathy.  Pt felt poorly.  He has breathed a lot of silica over the years, sandblasting tombstones.  He feels he does not breathe well. AFB testing was negative. He has been worked up for leukopenia and thrombocytopenia      has a past medical history of Benign essential hypertension, CAD in native artery, Elevated cholesterol, H/O acute myocardial infarction, History of PTCA, Hyperlipidemia, mild, Sleep apnea, and Tobacco use disorder.   has a past surgical history that includes Cardiac catheterization; Coronary stent placement; Colonoscopy; and Colonoscopy (N/A, 3/21/2022).  family history includes Alzheimer's disease in his father; Heart attack in his brother and sister; Heart disease in his mother; No Known Problems in his maternal grandfather, maternal grandmother, paternal grandfather, and paternal grandmother.   reports that he quit smoking about 17 years ago. His smoking use included cigarettes. He started smoking about 47 years ago. He has a 90 pack-year smoking history. He has never used smokeless tobacco. He reports current alcohol use. He reports that he does not use drugs.  Allergies   No Known Allergies          Current Outpatient Medications   Medication Instructions    aspirin 81 mg, Oral, Daily    atorvastatin (LIPITOR) 80 mg, Oral, Daily    citalopram (CeleXA) 40 MG tablet TAKE 1 TABLET BY MOUTH EVERY DAY    ezetimibe (ZETIA) 10 mg, Oral, Daily    lisinopril (PRINIVIL,ZESTRIL) 5 mg, Oral, Daily    nitroglycerin (NITROSTAT) 0.4 MG SL tablet TAKE 1 TABLET BY MOUTH  "EVERY 5 MINUTES UP TO 3 TIMES AS NEEDED FOR CHEST PAIN.    traZODone (DESYREL) 50 MG tablet TAKE 1 OR 2 TABLETS BY MOUTH AT BEDTIME AS NEEDED    vitamin B-12 (CYANOCOBALAMIN) 1,000 mcg, Oral, Daily            Objective  Vital Signs:   /80   Pulse 66   Ht 185.4 cm (73\")   Wt 101 kg (223 lb)   SpO2 95% Comment: RA  BMI 29.42 kg/m²   Physical Exam  Constitutional:       Appearance: Normal appearance. He is not ill-appearing or diaphoretic.   HENT:      Head: Normocephalic.   Eyes:      Extraocular Movements: Extraocular movements intact.   Pulmonary:      Effort: Pulmonary effort is normal. No respiratory distress.      Breath sounds: No stridor. No wheezing, rhonchi or rales.   Skin:     Findings: No erythema or rash.   Neurological:      Mental Status: He is alert.               Result Review  Data Reviewed:    Last 30 day radiology impressions   NM PET/CT Skull Base to Mid Thigh     Result Date: 3/27/2024  Impression:  1.  Innumerable groundglass nodules and surrounding groundglass opacity in the lungs persist and exhibit low-grade for uptake. These may be infectious or neoplastic. These are likely too small for percutaneous biopsy. 2.  Multiple enlarged mediastinal and bilateral hilar lymph nodes are more intensely hypermetabolic. These could potentially be biopsied under EBUS guidance. These may also be infectious/reactive and short interval follow-up could be considered. 3.  Hypermetabolic lymph node at the level of the josie splenic vein confluence.  This report was signed and finalized on 3/27/2024 12:44 PM by Grey Blood.         Personal review of imaging : CT scan shows station 4r, bilateral hilar mlad, ground glass nodules throughout both lungs  PFT Values            4/17/2024    09:15   Pre Drug PFT Results   FVC 81   FEV1 85   FEF 25-75% 97   FEV1/FVC 83.58            QuantiFERON gold negative.  Platelet counts reviewed, low but >100k        Assessment  Assessment and Plan   Diagnoses and " all orders for this visit:     1. Multiple lung nodules (Primary)  Comments:  ground glass  Orders:  -     Spirometry     2. Mediastinal adenopathy     Pt has significant mediastinal adenopathy and also ground glass lesions of uncertain significance, hematologic abnormalities, some symptoms suggesting infection or sarcoidosis.  Silicosis also considered, although that disease is usually associated with more prominent imaging findings including pet.  I think tissue dx is indicated considering differential dx and findings on ct  We discussed spirometry which is normal.  Mediastinal nodes can be sampled easily with linear ebus.  Nodules in lungs could be biopsied with ion robot although I would expect yield to be lower than for solid nodules with this modality.  Pt is agreeable with proceeding with this testing following discussion of risks and benefits.  He takes a baby aspirin per day and has not had any recent stents.  Will ask for cardiac assessment of risk.  Also we can do the procedure if 81 mg can't be held, but if it could be held for 3 days without short term risk, that may reduce procedure-associated bleeding.     Follow Up   Return in about 6 weeks (around 5/29/2024).  Patient was given instructions and counseling regarding his condition or for health maintenance advice. Please see specific information pulled into the AVS if appropriate.     Electronically signed by Milind Fowler MD, 4/17/2024, 15:40 CDT

## 2024-04-17 NOTE — H&P (VIEW-ONLY)
Prabhakar Xiao, [facility] Medical Assistant 1013     Copy     Expand All Collapse All  Background:  Pt w ggo,and ggn, mlad    Chief Complaint  mutiple lung nodules        Subjective  History of Present Illness    Gregorio Callahan presents to [facility].  History of Present Illness  He was evaluated in February, he had low blood counts, then further evaluation identified nodules and mediastinal adenopathy.  Pt felt poorly.  He has breathed a lot of silica over the years, sandblasting tombstones.  He feels he does not breathe well. AFB testing was negative. He has been worked up for leukopenia and thrombocytopenia      has a past medical history of Benign essential hypertension, CAD in native artery, Elevated cholesterol, H/O acute myocardial infarction, History of PTCA, Hyperlipidemia, mild, Sleep apnea, and Tobacco use disorder.   has a past surgical history that includes Cardiac catheterization; Coronary stent placement; Colonoscopy; and Colonoscopy (N/A, 3/21/2022).  family history includes Alzheimer's disease in his father; Heart attack in his brother and sister; Heart disease in his mother; No Known Problems in his maternal grandfather, maternal grandmother, paternal grandfather, and paternal grandmother.   reports that he quit smoking about 17 years ago. His smoking use included cigarettes. He started smoking about 47 years ago. He has a 90 pack-year smoking history. He has never used smokeless tobacco. He reports current alcohol use. He reports that he does not use drugs.  Allergies   No Known Allergies          Current Outpatient Medications   Medication Instructions    aspirin 81 mg, Oral, Daily    atorvastatin (LIPITOR) 80 mg, Oral, Daily    citalopram (CeleXA) 40 MG tablet TAKE 1 TABLET BY MOUTH EVERY DAY    ezetimibe (ZETIA) 10 mg, Oral, Daily    lisinopril (PRINIVIL,ZESTRIL) 5 mg, Oral, Daily    nitroglycerin (NITROSTAT) 0.4 MG SL tablet TAKE 1 TABLET BY MOUTH  "EVERY 5 MINUTES UP TO 3 TIMES AS NEEDED FOR CHEST PAIN.    traZODone (DESYREL) 50 MG tablet TAKE 1 OR 2 TABLETS BY MOUTH AT BEDTIME AS NEEDED    vitamin B-12 (CYANOCOBALAMIN) 1,000 mcg, Oral, Daily            Objective  Vital Signs:   /80   Pulse 66   Ht 185.4 cm (73\")   Wt 101 kg (223 lb)   SpO2 95% Comment: RA  BMI 29.42 kg/m²   Physical Exam  Constitutional:       Appearance: Normal appearance. He is not ill-appearing or diaphoretic.   HENT:      Head: Normocephalic.   Eyes:      Extraocular Movements: Extraocular movements intact.   Pulmonary:      Effort: Pulmonary effort is normal. No respiratory distress.      Breath sounds: No stridor. No wheezing, rhonchi or rales.   Skin:     Findings: No erythema or rash.   Neurological:      Mental Status: He is alert.               Result Review  Data Reviewed:    Last 30 day radiology impressions   NM PET/CT Skull Base to Mid Thigh     Result Date: 3/27/2024  Impression:  1.  Innumerable groundglass nodules and surrounding groundglass opacity in the lungs persist and exhibit low-grade for uptake. These may be infectious or neoplastic. These are likely too small for percutaneous biopsy. 2.  Multiple enlarged mediastinal and bilateral hilar lymph nodes are more intensely hypermetabolic. These could potentially be biopsied under EBUS guidance. These may also be infectious/reactive and short interval follow-up could be considered. 3.  Hypermetabolic lymph node at the level of the josie splenic vein confluence.  This report was signed and finalized on 3/27/2024 12:44 PM by Grey Blood.         Personal review of imaging : CT scan shows station 4r, bilateral hilar mlad, ground glass nodules throughout both lungs  PFT Values            4/17/2024    09:15   Pre Drug PFT Results   FVC 81   FEV1 85   FEF 25-75% 97   FEV1/FVC 83.58            QuantiFERON gold negative.  Platelet counts reviewed, low but >100k        Assessment  Assessment and Plan   Diagnoses and " all orders for this visit:     1. Multiple lung nodules (Primary)  Comments:  ground glass  Orders:  -     Spirometry     2. Mediastinal adenopathy     Pt has significant mediastinal adenopathy and also ground glass lesions of uncertain significance, hematologic abnormalities, some symptoms suggesting infection or sarcoidosis.  Silicosis also considered, although that disease is usually associated with more prominent imaging findings including pet.  I think tissue dx is indicated considering differential dx and findings on ct  We discussed spirometry which is normal.  Mediastinal nodes can be sampled easily with linear ebus.  Nodules in lungs could be biopsied with ion robot although I would expect yield to be lower than for solid nodules with this modality.  Pt is agreeable with proceeding with this testing following discussion of risks and benefits.  He takes a baby aspirin per day and has not had any recent stents.  Will ask for cardiac assessment of risk.  Also we can do the procedure if 81 mg can't be held, but if it could be held for 3 days without short term risk, that may reduce procedure-associated bleeding.     Follow Up   Return in about 6 weeks (around 5/29/2024).  Patient was given instructions and counseling regarding his condition or for health maintenance advice. Please see specific information pulled into the AVS if appropriate.     Electronically signed by Milind Fowler MD, 4/17/2024, 15:40 CDT

## 2024-04-17 NOTE — PROCEDURES
Spirometry    Performed by: Prabhakar Xiao CMA  Authorized by: Milind Fowler MD     Pre Drug % Predicted    FVC: 81%   FEV1: 85%   FEF 25-75%: 97%   FEV1/FVC: 83.58%    Interpretation   Spirometry   Spirometry shows normal results. midflow is normal.  Electronically signed by Milind Fowler MD, 4/17/2024, 11:53 CDT

## 2024-04-17 NOTE — PROGRESS NOTES
Background:  Pt w ggo,and ggn, mlad    Chief Complaint  mutiple lung nodules    Subjective    History of Present Illness    Gregorio Callahan presents to [facility]IT[facility]IC[facility].  History of Present Illness  He was evaluated in February, he had low blood counts, then further evaluation identified nodules and mediastinal adenopathy.  Pt felt poorly.  He has breathed a lot of silica over the years, sandblasting tombstones.  He feels he does not breathe well. AFB testing was negative     has a past medical history of Benign essential hypertension, CAD in native artery, Elevated cholesterol, H/O acute myocardial infarction, History of PTCA, Hyperlipidemia, mild, Sleep apnea, and Tobacco use disorder.   has a past surgical history that includes Cardiac catheterization; Coronary stent placement; Colonoscopy; and Colonoscopy (N/A, 3/21/2022).  family history includes Alzheimer's disease in his father; Heart attack in his brother and sister; Heart disease in his mother; No Known Problems in his maternal grandfather, maternal grandmother, paternal grandfather, and paternal grandmother.   reports that he quit smoking about 17 years ago. His smoking use included cigarettes. He started smoking about 47 years ago. He has a 90 pack-year smoking history. He has never used smokeless tobacco. He reports current alcohol use. He reports that he does not use drugs.  No Known Allergies  Current Outpatient Medications   Medication Instructions    aspirin 81 mg, Oral, Daily    atorvastatin (LIPITOR) 80 mg, Oral, Daily    citalopram (CeleXA) 40 MG tablet TAKE 1 TABLET BY MOUTH EVERY DAY    ezetimibe (ZETIA) 10 mg, Oral, Daily    lisinopril (PRINIVIL,ZESTRIL) 5 mg, Oral, Daily    nitroglycerin (NITROSTAT) 0.4 MG SL tablet TAKE 1 TABLET BY MOUTH EVERY 5 MINUTES UP TO 3 TIMES AS NEEDED FOR CHEST PAIN.    traZODone (DESYREL) 50 MG tablet TAKE 1 OR 2 TABLETS BY MOUTH AT BEDTIME AS NEEDED    vitamin B-12  "(CYANOCOBALAMIN) 1,000 mcg, Oral, Daily      Objective     Vital Signs:   /80   Pulse 66   Ht 185.4 cm (73\")   Wt 101 kg (223 lb)   SpO2 95% Comment: RA  BMI 29.42 kg/m²   Physical Exam  Constitutional:       Appearance: Normal appearance. He is not ill-appearing or diaphoretic.   HENT:      Head: Normocephalic.   Eyes:      Extraocular Movements: Extraocular movements intact.   Pulmonary:      Effort: Pulmonary effort is normal. No respiratory distress.      Breath sounds: No stridor. No wheezing, rhonchi or rales.   Skin:     Findings: No erythema or rash.   Neurological:      Mental Status: He is alert.        Result Review  Data Reviewed:  NM PET/CT Skull Base to Mid Thigh    Result Date: 3/27/2024  Impression:  1.  Innumerable groundglass nodules and surrounding groundglass opacity in the lungs persist and exhibit low-grade for uptake. These may be infectious or neoplastic. These are likely too small for percutaneous biopsy. 2.  Multiple enlarged mediastinal and bilateral hilar lymph nodes are more intensely hypermetabolic. These could potentially be biopsied under EBUS guidance. These may also be infectious/reactive and short interval follow-up could be considered. 3.  Hypermetabolic lymph node at the level of the josie splenic vein confluence.  This report was signed and finalized on 3/27/2024 12:44 PM by Grey Blood.      Personal review of imaging : CT scan shows station 4r, bilateral hilar mlad, ground glass nodules throughout both lungs  PFT Values          4/17/2024    09:15   Pre Drug PFT Results   FVC 81   FEV1 85   FEF 25-75% 97   FEV1/FVC 83.58           QuantiFERON gold negative.     Assessment and Plan   Diagnoses and all orders for this visit:    1. Multiple lung nodules (Primary)  Comments:  ground glass  Orders:  -     Spirometry    2. Mediastinal adenopathy    Pt has significant mediastinal adenopathy and also ground glass lesions of uncertain significance, some symptoms " suggesting infection or sarcoidosis.  Silicosis also considered, although that disease is usually associated with more prominent imaging findings including pet.  I think tissue dx is indicated considering differential dx and findings on ct  We discussed spirometry which is normal.  Mediastinal nodes can be sampled easily with linear ebus.  Nodules in lungs could be biopsied with ion robot although I would expect yield to be lower than for solid nodules with this modality.  Pt is agreeable with proceeding with this testing following discussion of risks and benefits.  He takes a baby aspirin per day and has not had any recent stents.  Will ask for cardiac assessment of risk.  Also we can do the procedure if 81 mg can't be held, but if it could be held for 3 days without short term risk, that may reduce procedure-associated bleeding.    Follow Up   Return in about 6 weeks (around 5/29/2024).  Patient was given instructions and counseling regarding his condition or for health maintenance advice. Please see specific information pulled into the AVS if appropriate.    Electronically signed by Milind Fowler MD, 4/17/2024, 15:40 CDT     100

## 2024-04-19 ENCOUNTER — TELEPHONE (OUTPATIENT)
Dept: PULMONOLOGY | Facility: CLINIC | Age: 64
End: 2024-04-19
Payer: COMMERCIAL

## 2024-04-19 PROBLEM — R91.8 MULTIPLE LUNG NODULES: Status: ACTIVE | Noted: 2024-04-17

## 2024-04-19 NOTE — TELEPHONE ENCOUNTER
Patient is scheduled for Bronchoscopy w/ ION Robot & EBUS on Monday 05/13/24 at 1:00 pm.      Pending Cardiac Risk Assessment from  Cardiology

## 2024-04-23 ENCOUNTER — HOSPITAL ENCOUNTER (OUTPATIENT)
Dept: CT IMAGING | Facility: HOSPITAL | Age: 64
Discharge: HOME | End: 2024-04-23
Admitting: RADIOLOGY
Payer: COMMERCIAL

## 2024-04-23 VITALS
TEMPERATURE: 96.9 F | HEIGHT: 72 IN | OXYGEN SATURATION: 95 % | WEIGHT: 224.87 LBS | SYSTOLIC BLOOD PRESSURE: 126 MMHG | DIASTOLIC BLOOD PRESSURE: 82 MMHG | HEART RATE: 56 BPM | RESPIRATION RATE: 16 BRPM | BODY MASS INDEX: 30.46 KG/M2

## 2024-04-23 DIAGNOSIS — D61.818 PANCYTOPENIA: ICD-10-CM

## 2024-04-23 LAB
ACANTHOCYTES BLD QL SMEAR: ABNORMAL
ANISOCYTOSIS BLD QL: ABNORMAL
BASOPHILS # BLD MANUAL: 0.02 10*3/MM3 (ref 0–0.2)
BASOPHILS NFR BLD MANUAL: 1 % (ref 0–1.5)
DACRYOCYTES BLD QL SMEAR: ABNORMAL
DEPRECATED RDW RBC AUTO: 50.5 FL (ref 37–54)
ERYTHROCYTE [DISTWIDTH] IN BLOOD BY AUTOMATED COUNT: 14.9 % (ref 12.3–15.4)
HCT VFR BLD AUTO: 39.6 % (ref 37.5–51)
HGB BLD-MCNC: 13.2 G/DL (ref 13–17.7)
INR PPP: 1.04 (ref 0.91–1.09)
LYMPHOCYTES # BLD MANUAL: 0.91 10*3/MM3 (ref 0.7–3.1)
LYMPHOCYTES NFR BLD MANUAL: 21 % (ref 5–12)
MCH RBC QN AUTO: 30.8 PG (ref 26.6–33)
MCHC RBC AUTO-ENTMCNC: 33.3 G/DL (ref 31.5–35.7)
MCV RBC AUTO: 92.3 FL (ref 79–97)
MONOCYTES # BLD: 0.49 10*3/MM3 (ref 0.1–0.9)
NEUTROPHILS # BLD AUTO: 0.91 10*3/MM3 (ref 1.7–7)
NEUTROPHILS NFR BLD MANUAL: 39 % (ref 42.7–76)
OVALOCYTES BLD QL SMEAR: ABNORMAL
PLATELET # BLD AUTO: 91 10*3/MM3 (ref 140–450)
PMV BLD AUTO: 11 FL (ref 6–12)
POIKILOCYTOSIS BLD QL SMEAR: ABNORMAL
PROTHROMBIN TIME: 14 SECONDS (ref 11.8–14.8)
RBC # BLD AUTO: 4.29 10*6/MM3 (ref 4.14–5.8)
SMALL PLATELETS BLD QL SMEAR: ABNORMAL
VARIANT LYMPHS NFR BLD MANUAL: 11 % (ref 0–5)
VARIANT LYMPHS NFR BLD MANUAL: 28 % (ref 19.6–45.3)
WBC MORPH BLD: NORMAL
WBC NRBC COR # BLD AUTO: 2.33 10*3/MM3 (ref 3.4–10.8)

## 2024-04-23 PROCEDURE — 88311 DECALCIFY TISSUE: CPT | Performed by: NURSE PRACTITIONER

## 2024-04-23 PROCEDURE — 25010000002 FENTANYL CITRATE (PF) 50 MCG/ML SOLUTION: Performed by: RADIOLOGY

## 2024-04-23 PROCEDURE — 25010000002 LIDOCAINE 1 % SOLUTION: Performed by: RADIOLOGY

## 2024-04-23 PROCEDURE — 88305 TISSUE EXAM BY PATHOLOGIST: CPT | Performed by: NURSE PRACTITIONER

## 2024-04-23 PROCEDURE — 77012 CT SCAN FOR NEEDLE BIOPSY: CPT

## 2024-04-23 PROCEDURE — 85610 PROTHROMBIN TIME: CPT | Performed by: RADIOLOGY

## 2024-04-23 PROCEDURE — 88313 SPECIAL STAINS GROUP 2: CPT | Performed by: NURSE PRACTITIONER

## 2024-04-23 PROCEDURE — 85025 COMPLETE CBC W/AUTO DIFF WBC: CPT | Performed by: NURSE PRACTITIONER

## 2024-04-23 PROCEDURE — 25010000002 MIDAZOLAM PER 1 MG: Performed by: RADIOLOGY

## 2024-04-23 PROCEDURE — 85007 BL SMEAR W/DIFF WBC COUNT: CPT | Performed by: NURSE PRACTITIONER

## 2024-04-23 RX ORDER — LIDOCAINE HYDROCHLORIDE 10 MG/ML
INJECTION, SOLUTION INFILTRATION; PERINEURAL AS NEEDED
Status: COMPLETED | OUTPATIENT
Start: 2024-04-23 | End: 2024-04-23

## 2024-04-23 RX ORDER — SODIUM CHLORIDE 0.9 % (FLUSH) 0.9 %
3 SYRINGE (ML) INJECTION EVERY 12 HOURS SCHEDULED
Status: DISCONTINUED | OUTPATIENT
Start: 2024-04-23 | End: 2024-04-24 | Stop reason: HOSPADM

## 2024-04-23 RX ORDER — FENTANYL CITRATE 50 UG/ML
INJECTION, SOLUTION INTRAMUSCULAR; INTRAVENOUS AS NEEDED
Status: COMPLETED | OUTPATIENT
Start: 2024-04-23 | End: 2024-04-23

## 2024-04-23 RX ORDER — SODIUM CHLORIDE 0.9 % (FLUSH) 0.9 %
10 SYRINGE (ML) INJECTION AS NEEDED
Status: DISCONTINUED | OUTPATIENT
Start: 2024-04-23 | End: 2024-04-24 | Stop reason: HOSPADM

## 2024-04-23 RX ORDER — MIDAZOLAM HYDROCHLORIDE 1 MG/ML
INJECTION INTRAMUSCULAR; INTRAVENOUS AS NEEDED
Status: COMPLETED | OUTPATIENT
Start: 2024-04-23 | End: 2024-04-23

## 2024-04-23 RX ORDER — SODIUM CHLORIDE 9 MG/ML
40 INJECTION, SOLUTION INTRAVENOUS AS NEEDED
Status: DISCONTINUED | OUTPATIENT
Start: 2024-04-23 | End: 2024-04-24 | Stop reason: HOSPADM

## 2024-04-23 RX ADMIN — FENTANYL CITRATE 25 MCG: 50 INJECTION INTRAMUSCULAR; INTRAVENOUS at 10:28

## 2024-04-23 RX ADMIN — LIDOCAINE HYDROCHLORIDE 9 ML: 10 INJECTION, SOLUTION INFILTRATION; PERINEURAL at 10:30

## 2024-04-23 RX ADMIN — MIDAZOLAM 1 MG: 1 INJECTION INTRAMUSCULAR; INTRAVENOUS at 10:28

## 2024-04-23 NOTE — PRE-PROCEDURE NOTE
Risk, Benefits, and Alternatives discussed with the patient.  History and Physical reviewed, and no changes.  Plan is for moderate sedation, and the patient agrees to proceed with procedure.    An immediate assessment was done prior to the administration of moderate sedation.

## 2024-04-24 LAB
LAB AP CASE REPORT: NORMAL
Lab: NORMAL
PATH REPORT.FINAL DX SPEC: NORMAL
PATH REPORT.GROSS SPEC: NORMAL

## 2024-04-25 ENCOUNTER — TELEPHONE (OUTPATIENT)
Dept: PULMONOLOGY | Facility: CLINIC | Age: 64
End: 2024-04-25
Payer: COMMERCIAL

## 2024-04-25 ENCOUNTER — TELEPHONE (OUTPATIENT)
Dept: CARDIOLOGY | Facility: CLINIC | Age: 64
End: 2024-04-25
Payer: COMMERCIAL

## 2024-04-25 NOTE — TELEPHONE ENCOUNTER
Received a call from [facility] stating this patient is scheduled for a bronchoscopy on 05/13, they are needing patient to hold 81 mg ASA prior to procedure. Please advise.        [phone] (phone)  [phone] (fax)

## 2024-04-25 NOTE — TELEPHONE ENCOUNTER
Left detailed message with Lashae/Dr. Chew's nurse regarding Cardiac Risk Assessment for patient to hold his ASA for a Bronchoscopy w/ ION Robot that is scheduled on 05/13/24.

## 2024-04-26 NOTE — TELEPHONE ENCOUNTER
Please see Dr. Chew's not regarding Cardiac Assessment for bronch scheduled on 05/13/24.  Do you just want him to continue his ASA during procedure?

## 2024-04-26 NOTE — TELEPHONE ENCOUNTER
Wilner Chew MD  You15 hours ago (4:22 PM)     Patient previously followed with Dr. Lynn and I have never seen before.  Previous records do indicate coronary artery disease with history of myocardial infarction.  There is therefore a higher risk of cardiovascular events while off aspirin.  Ideally aspirin should be continued uninterrupted.  If bleeding risk is felt to be prohibitive, aspirin should be held for the shortest possible duration and resumed as soon as possible with the understanding there is an increased risk of cardiovascular events.

## 2024-04-29 NOTE — TELEPHONE ENCOUNTER
Left message for patient to call back to confirm that he will need to continue his ASA during the biopsy procedure scheduled on 05/13/24.

## 2024-04-29 NOTE — TELEPHONE ENCOUNTER
Patient called back and has been informed to continue his ASA during the biopsy procedure per cardiology.  He also has been rescheduled for the Chest CT which was canceled for unknown reason.  Patient informed he will need to have the Chest CT if it were to get canceled again and to call the office.  He voiced understanding.

## 2024-04-30 ENCOUNTER — TELEPHONE (OUTPATIENT)
Dept: ONCOLOGY | Facility: CLINIC | Age: 64
End: 2024-04-30
Payer: COMMERCIAL

## 2024-04-30 NOTE — TELEPHONE ENCOUNTER
Caller: Gregorio Callahan    Relationship: Self    Best call back number: [phone]    What test was performed: BONE MARROW    When was the test performed: 4/23/24    Where was the test performed:

## 2024-04-30 NOTE — TELEPHONE ENCOUNTER
Pt has been notified that not all components to bone marrow report have been resulted just yet. He v/u

## 2024-05-06 ENCOUNTER — PRE-ADMISSION TESTING (OUTPATIENT)
Dept: PREADMISSION TESTING | Facility: HOSPITAL | Age: 64
End: 2024-05-06
Payer: COMMERCIAL

## 2024-05-06 ENCOUNTER — HOSPITAL ENCOUNTER (OUTPATIENT)
Dept: CT IMAGING | Facility: HOSPITAL | Age: 64
Discharge: HOME | End: 2024-05-06
Payer: COMMERCIAL

## 2024-05-06 ENCOUNTER — APPOINTMENT (OUTPATIENT)
Dept: CT IMAGING | Facility: HOSPITAL | Age: 64
End: 2024-05-06
Payer: COMMERCIAL

## 2024-05-06 VITALS
WEIGHT: 229.5 LBS | SYSTOLIC BLOOD PRESSURE: 133 MMHG | BODY MASS INDEX: 31.08 KG/M2 | HEART RATE: 74 BPM | OXYGEN SATURATION: 95 % | RESPIRATION RATE: 18 BRPM | HEIGHT: 72 IN | DIASTOLIC BLOOD PRESSURE: 64 MMHG

## 2024-05-06 DIAGNOSIS — R91.8 MULTIPLE LUNG NODULES: ICD-10-CM

## 2024-05-06 LAB
ANION GAP SERPL CALCULATED.3IONS-SCNC: 9 MMOL/L (ref 5–15)
BUN SERPL-MCNC: 12 MG/DL (ref 8–23)
BUN/CREAT SERPL: 14.1 (ref 7–25)
CALCIUM SPEC-SCNC: 9.1 MG/DL (ref 8.6–10.5)
CHLORIDE SERPL-SCNC: 105 MMOL/L (ref 98–107)
CO2 SERPL-SCNC: 26 MMOL/L (ref 22–29)
CREAT SERPL-MCNC: 0.85 MG/DL (ref 0.76–1.27)
EGFRCR SERPLBLD CKD-EPI 2021: 97.6 ML/MIN/1.73
GLUCOSE SERPL-MCNC: 132 MG/DL (ref 65–99)
POTASSIUM SERPL-SCNC: 4 MMOL/L (ref 3.5–5.2)
SODIUM SERPL-SCNC: 140 MMOL/L (ref 136–145)

## 2024-05-06 PROCEDURE — 80048 BASIC METABOLIC PNL TOTAL CA: CPT

## 2024-05-06 PROCEDURE — 71250 CT THORAX DX C-: CPT

## 2024-05-06 PROCEDURE — 36415 COLL VENOUS BLD VENIPUNCTURE: CPT

## 2024-05-13 ENCOUNTER — APPOINTMENT (OUTPATIENT)
Dept: GENERAL RADIOLOGY | Facility: HOSPITAL | Age: 64
End: 2024-05-13
Payer: COMMERCIAL

## 2024-05-13 ENCOUNTER — ANESTHESIA EVENT (OUTPATIENT)
Dept: PERIOP | Facility: HOSPITAL | Age: 64
End: 2024-05-13
Payer: COMMERCIAL

## 2024-05-13 ENCOUNTER — HOSPITAL ENCOUNTER (OUTPATIENT)
Facility: HOSPITAL | Age: 64
Setting detail: HOSPITAL OUTPATIENT SURGERY
Discharge: HOME | End: 2024-05-13
Attending: INTERNAL MEDICINE | Admitting: INTERNAL MEDICINE
Payer: COMMERCIAL

## 2024-05-13 ENCOUNTER — ANESTHESIA (OUTPATIENT)
Dept: PERIOP | Facility: HOSPITAL | Age: 64
End: 2024-05-13
Payer: COMMERCIAL

## 2024-05-13 VITALS
DIASTOLIC BLOOD PRESSURE: 84 MMHG | RESPIRATION RATE: 16 BRPM | SYSTOLIC BLOOD PRESSURE: 137 MMHG | HEART RATE: 66 BPM | TEMPERATURE: 97.8 F | OXYGEN SATURATION: 92 %

## 2024-05-13 DIAGNOSIS — R91.8 MULTIPLE LUNG NODULES: ICD-10-CM

## 2024-05-13 PROBLEM — R59.0 MEDIASTINAL ADENOPATHY: Status: ACTIVE | Noted: 2024-05-13

## 2024-05-13 LAB — KOH PREP NAIL: NORMAL

## 2024-05-13 PROCEDURE — 88305 TISSUE EXAM BY PATHOLOGIST: CPT | Performed by: INTERNAL MEDICINE

## 2024-05-13 PROCEDURE — 25010000002 FENTANYL CITRATE (PF) 100 MCG/2ML SOLUTION

## 2024-05-13 PROCEDURE — 87102 FUNGUS ISOLATION CULTURE: CPT | Performed by: INTERNAL MEDICINE

## 2024-05-13 PROCEDURE — 31624 DX BRONCHOSCOPE/LAVAGE: CPT | Performed by: INTERNAL MEDICINE

## 2024-05-13 PROCEDURE — 88172 CYTP DX EVAL FNA 1ST EA SITE: CPT | Performed by: INTERNAL MEDICINE

## 2024-05-13 PROCEDURE — 88177 CYTP FNA EVAL EA ADDL: CPT | Performed by: INTERNAL MEDICINE

## 2024-05-13 PROCEDURE — 25810000003 LACTATED RINGERS PER 1000 ML: Performed by: INTERNAL MEDICINE

## 2024-05-13 PROCEDURE — 71045 X-RAY EXAM CHEST 1 VIEW: CPT

## 2024-05-13 PROCEDURE — 87205 SMEAR GRAM STAIN: CPT | Performed by: INTERNAL MEDICINE

## 2024-05-13 PROCEDURE — 87206 SMEAR FLUORESCENT/ACID STAI: CPT | Performed by: INTERNAL MEDICINE

## 2024-05-13 PROCEDURE — 25010000002 PROPOFOL 10 MG/ML EMULSION

## 2024-05-13 PROCEDURE — 88112 CYTOPATH CELL ENHANCE TECH: CPT | Performed by: INTERNAL MEDICINE

## 2024-05-13 PROCEDURE — 87220 TISSUE EXAM FOR FUNGI: CPT | Performed by: INTERNAL MEDICINE

## 2024-05-13 PROCEDURE — 31629 BRONCHOSCOPY/NEEDLE BX EACH: CPT | Performed by: INTERNAL MEDICINE

## 2024-05-13 PROCEDURE — 25010000002 SUGAMMADEX 200 MG/2ML SOLUTION

## 2024-05-13 PROCEDURE — 87116 MYCOBACTERIA CULTURE: CPT | Performed by: INTERNAL MEDICINE

## 2024-05-13 PROCEDURE — 25010000002 ONDANSETRON PER 1 MG

## 2024-05-13 PROCEDURE — C1726 CATH, BAL DIL, NON-VASCULAR: HCPCS | Performed by: INTERNAL MEDICINE

## 2024-05-13 PROCEDURE — 87070 CULTURE OTHR SPECIMN AEROBIC: CPT | Performed by: INTERNAL MEDICINE

## 2024-05-13 PROCEDURE — 76000 FLUOROSCOPY <1 HR PHYS/QHP: CPT

## 2024-05-13 PROCEDURE — 31627 NAVIGATIONAL BRONCHOSCOPY: CPT | Performed by: INTERNAL MEDICINE

## 2024-05-13 PROCEDURE — 25010000002 DEXAMETHASONE PER 1 MG

## 2024-05-13 PROCEDURE — 31654 BRONCH EBUS IVNTJ PERPH LES: CPT | Performed by: INTERNAL MEDICINE

## 2024-05-13 PROCEDURE — 31628 BRONCHOSCOPY/LUNG BX EACH: CPT | Performed by: INTERNAL MEDICINE

## 2024-05-13 PROCEDURE — 31653 BRONCH EBUS SAMPLNG 3/> NODE: CPT | Performed by: INTERNAL MEDICINE

## 2024-05-13 RX ORDER — SODIUM CHLORIDE 9 MG/ML
40 INJECTION, SOLUTION INTRAVENOUS AS NEEDED
Status: DISCONTINUED | OUTPATIENT
Start: 2024-05-13 | End: 2024-05-13 | Stop reason: HOSPADM

## 2024-05-13 RX ORDER — FENTANYL CITRATE 50 UG/ML
50 INJECTION, SOLUTION INTRAMUSCULAR; INTRAVENOUS
Status: DISCONTINUED | OUTPATIENT
Start: 2024-05-13 | End: 2024-05-13 | Stop reason: HOSPADM

## 2024-05-13 RX ORDER — SODIUM CHLORIDE 0.9 % (FLUSH) 0.9 %
3-10 SYRINGE (ML) INJECTION AS NEEDED
Status: DISCONTINUED | OUTPATIENT
Start: 2024-05-13 | End: 2024-05-13 | Stop reason: HOSPADM

## 2024-05-13 RX ORDER — DROPERIDOL 2.5 MG/ML
0.62 INJECTION, SOLUTION INTRAMUSCULAR; INTRAVENOUS ONCE AS NEEDED
Status: DISCONTINUED | OUTPATIENT
Start: 2024-05-13 | End: 2024-05-13 | Stop reason: HOSPADM

## 2024-05-13 RX ORDER — SODIUM CHLORIDE 0.9 % (FLUSH) 0.9 %
3 SYRINGE (ML) INJECTION AS NEEDED
Status: DISCONTINUED | OUTPATIENT
Start: 2024-05-13 | End: 2024-05-13 | Stop reason: HOSPADM

## 2024-05-13 RX ORDER — DEXAMETHASONE SODIUM PHOSPHATE 4 MG/ML
INJECTION, SOLUTION INTRA-ARTICULAR; INTRALESIONAL; INTRAMUSCULAR; INTRAVENOUS; SOFT TISSUE AS NEEDED
Status: DISCONTINUED | OUTPATIENT
Start: 2024-05-13 | End: 2024-05-13 | Stop reason: SURG

## 2024-05-13 RX ORDER — SODIUM CHLORIDE, SODIUM LACTATE, POTASSIUM CHLORIDE, CALCIUM CHLORIDE 600; 310; 30; 20 MG/100ML; MG/100ML; MG/100ML; MG/100ML
1000 INJECTION, SOLUTION INTRAVENOUS CONTINUOUS
Status: DISCONTINUED | OUTPATIENT
Start: 2024-05-13 | End: 2024-05-13 | Stop reason: HOSPADM

## 2024-05-13 RX ORDER — NALOXONE HCL 0.4 MG/ML
0.4 VIAL (ML) INJECTION AS NEEDED
Status: DISCONTINUED | OUTPATIENT
Start: 2024-05-13 | End: 2024-05-13 | Stop reason: HOSPADM

## 2024-05-13 RX ORDER — LABETALOL HYDROCHLORIDE 5 MG/ML
5 INJECTION, SOLUTION INTRAVENOUS
Status: DISCONTINUED | OUTPATIENT
Start: 2024-05-13 | End: 2024-05-13 | Stop reason: HOSPADM

## 2024-05-13 RX ORDER — SODIUM CHLORIDE 0.9 % (FLUSH) 0.9 %
10 SYRINGE (ML) INJECTION EVERY 12 HOURS SCHEDULED
Status: DISCONTINUED | OUTPATIENT
Start: 2024-05-13 | End: 2024-05-13 | Stop reason: HOSPADM

## 2024-05-13 RX ORDER — SODIUM CHLORIDE 0.9 % (FLUSH) 0.9 %
10 SYRINGE (ML) INJECTION AS NEEDED
Status: DISCONTINUED | OUTPATIENT
Start: 2024-05-13 | End: 2024-05-13 | Stop reason: HOSPADM

## 2024-05-13 RX ORDER — HYDROCODONE BITARTRATE AND ACETAMINOPHEN 10; 325 MG/1; MG/1
1 TABLET ORAL EVERY 4 HOURS PRN
Status: DISCONTINUED | OUTPATIENT
Start: 2024-05-13 | End: 2024-05-13 | Stop reason: HOSPADM

## 2024-05-13 RX ORDER — FENTANYL CITRATE 50 UG/ML
INJECTION, SOLUTION INTRAMUSCULAR; INTRAVENOUS AS NEEDED
Status: DISCONTINUED | OUTPATIENT
Start: 2024-05-13 | End: 2024-05-13 | Stop reason: SURG

## 2024-05-13 RX ORDER — SODIUM CHLORIDE 0.9 % (FLUSH) 0.9 %
3 SYRINGE (ML) INJECTION EVERY 12 HOURS SCHEDULED
Status: DISCONTINUED | OUTPATIENT
Start: 2024-05-13 | End: 2024-05-13 | Stop reason: HOSPADM

## 2024-05-13 RX ORDER — ONDANSETRON 2 MG/ML
4 INJECTION INTRAMUSCULAR; INTRAVENOUS ONCE AS NEEDED
Status: DISCONTINUED | OUTPATIENT
Start: 2024-05-13 | End: 2024-05-13 | Stop reason: HOSPADM

## 2024-05-13 RX ORDER — LIDOCAINE HYDROCHLORIDE 40 MG/ML
SOLUTION TOPICAL AS NEEDED
Status: DISCONTINUED | OUTPATIENT
Start: 2024-05-13 | End: 2024-05-13 | Stop reason: SURG

## 2024-05-13 RX ORDER — HYDROCODONE BITARTRATE AND ACETAMINOPHEN 5; 325 MG/1; MG/1
1 TABLET ORAL EVERY 4 HOURS PRN
Status: DISCONTINUED | OUTPATIENT
Start: 2024-05-13 | End: 2024-05-13 | Stop reason: HOSPADM

## 2024-05-13 RX ORDER — PROPOFOL 10 MG/ML
VIAL (ML) INTRAVENOUS AS NEEDED
Status: DISCONTINUED | OUTPATIENT
Start: 2024-05-13 | End: 2024-05-13 | Stop reason: SURG

## 2024-05-13 RX ORDER — ONDANSETRON 2 MG/ML
INJECTION INTRAMUSCULAR; INTRAVENOUS AS NEEDED
Status: DISCONTINUED | OUTPATIENT
Start: 2024-05-13 | End: 2024-05-13 | Stop reason: SURG

## 2024-05-13 RX ORDER — FLUMAZENIL 0.1 MG/ML
0.2 INJECTION INTRAVENOUS AS NEEDED
Status: DISCONTINUED | OUTPATIENT
Start: 2024-05-13 | End: 2024-05-13 | Stop reason: HOSPADM

## 2024-05-13 RX ORDER — SODIUM CHLORIDE, SODIUM LACTATE, POTASSIUM CHLORIDE, CALCIUM CHLORIDE 600; 310; 30; 20 MG/100ML; MG/100ML; MG/100ML; MG/100ML
100 INJECTION, SOLUTION INTRAVENOUS CONTINUOUS
Status: DISCONTINUED | OUTPATIENT
Start: 2024-05-13 | End: 2024-05-13 | Stop reason: HOSPADM

## 2024-05-13 RX ORDER — LIDOCAINE HYDROCHLORIDE 20 MG/ML
INJECTION, SOLUTION EPIDURAL; INFILTRATION; INTRACAUDAL; PERINEURAL AS NEEDED
Status: DISCONTINUED | OUTPATIENT
Start: 2024-05-13 | End: 2024-05-13 | Stop reason: SURG

## 2024-05-13 RX ORDER — ROCURONIUM BROMIDE 10 MG/ML
INJECTION, SOLUTION INTRAVENOUS AS NEEDED
Status: DISCONTINUED | OUTPATIENT
Start: 2024-05-13 | End: 2024-05-13 | Stop reason: SURG

## 2024-05-13 RX ORDER — LIDOCAINE HYDROCHLORIDE 10 MG/ML
0.5 INJECTION, SOLUTION EPIDURAL; INFILTRATION; INTRACAUDAL; PERINEURAL ONCE AS NEEDED
Status: DISCONTINUED | OUTPATIENT
Start: 2024-05-13 | End: 2024-05-13 | Stop reason: HOSPADM

## 2024-05-13 RX ORDER — ACETAMINOPHEN 500 MG
1000 TABLET ORAL ONCE
Status: COMPLETED | OUTPATIENT
Start: 2024-05-13 | End: 2024-05-13

## 2024-05-13 RX ORDER — IBUPROFEN 600 MG/1
600 TABLET ORAL EVERY 6 HOURS PRN
Status: DISCONTINUED | OUTPATIENT
Start: 2024-05-13 | End: 2024-05-13 | Stop reason: HOSPADM

## 2024-05-13 RX ADMIN — SUGAMMADEX 200 MG: 100 INJECTION, SOLUTION INTRAVENOUS at 13:50

## 2024-05-13 RX ADMIN — ACETAMINOPHEN 1000 MG: 500 TABLET, FILM COATED ORAL at 12:45

## 2024-05-13 RX ADMIN — PROPOFOL 150 MG: 10 INJECTION, EMULSION INTRAVENOUS at 12:57

## 2024-05-13 RX ADMIN — DEXAMETHASONE SODIUM PHOSPHATE 8 MG: 4 INJECTION, SOLUTION INTRAMUSCULAR; INTRAVENOUS at 13:08

## 2024-05-13 RX ADMIN — ROCURONIUM BROMIDE 100 MG: 50 INJECTION INTRAVENOUS at 12:57

## 2024-05-13 RX ADMIN — ONDANSETRON 4 MG: 2 INJECTION INTRAMUSCULAR; INTRAVENOUS at 13:08

## 2024-05-13 RX ADMIN — LIDOCAINE HYDROCHLORIDE 100 MG: 20 INJECTION, SOLUTION EPIDURAL; INFILTRATION; INTRACAUDAL; PERINEURAL at 12:57

## 2024-05-13 RX ADMIN — FENTANYL CITRATE 100 MCG: 50 INJECTION, SOLUTION INTRAMUSCULAR; INTRAVENOUS at 12:57

## 2024-05-13 RX ADMIN — SODIUM CHLORIDE, POTASSIUM CHLORIDE, SODIUM LACTATE AND CALCIUM CHLORIDE 1000 ML: 600; 310; 30; 20 INJECTION, SOLUTION INTRAVENOUS at 11:47

## 2024-05-13 RX ADMIN — LIDOCAINE HYDROCHLORIDE 1 EACH: 40 SOLUTION TOPICAL at 12:57

## 2024-05-13 NOTE — ANESTHESIA POSTPROCEDURE EVALUATION
Patient: Gregorio Callahan    Procedure Summary       Date: 05/13/24 Room / Location: [facility] OR  /  [facility] OR    Anesthesia Start: 1254 Anesthesia Stop: 1358    Procedures:       BRONCHOSCOPY WITH ION ROBOT and BRONCHOSCOPY WITH ENDOBRONCHIAL ULTRASOUND (Bronchus)      BRONCHOSCOPY WITH ENDOBRONCHIAL ULTRASOUND (Bronchus) Diagnosis:       Multiple lung nodules      (Multiple lung nodules [R91.8])    Surgeons: Milind Fowler MD Provider: Dominick Shukla CRNA    Anesthesia Type: general ASA Status: 2            Anesthesia Type: general    Vitals  Vitals Value Taken Time   /88 05/13/24 1420   Temp 97.8 °F (36.6 °C) 05/13/24 1430   Pulse 68 05/13/24 1430   Resp 15 05/13/24 1430   SpO2 93 % 05/13/24 1430           Post Anesthesia Care and Evaluation    Patient location during evaluation: PACU  Patient participation: complete - patient participated  Level of consciousness: awake and alert  Pain management: adequate    Airway patency: patent  Anesthetic complications: No anesthetic complications  PONV Status: none  Cardiovascular status: acceptable and hemodynamically stable  Respiratory status: acceptable  Hydration status: acceptable    Comments: Blood pressure 137/84, pulse 66, temperature 97.8 °F (36.6 °C), temperature source Temporal, resp. rate 16, SpO2 92%.    Patient discharged from PACU based upon Carmelo score. Please see RN notes for further details

## 2024-05-13 NOTE — OP NOTE
Procedure Note (AdvancedBronchoscopy)    Date of Operation: 05/13/24  Pre-op Diagnosis: Multiple lung nodules  Post-op Diagnosis: Same  Surgeon: Milind Fowler MD  Anesthesia: General    Operation: Flexible fiberoptic bronchoscopy, Bronchoscopy, Diagnostic, Ion robotic shape sensing navigational bronchoscopy, Linear endobronchial ultrasound, Bronchoalveolar lavage, BAL, Bronchial wash, Transbronchial biopsy, Transbronchial needle aspirate of mediastinum, and Transbronchial needle aspirate of lung with navigational and fluoroscopic guidance  Findings: mild endobronchial mucus, mildly enlarged nodes stations 4r, 4l, 7  Specimen: bronch wash, bal SANTIAGO, tbbx lung, tbna lung SANTIAGO, tbna mediastinum multiple stations  Estimated Blood Loss: Minimal  Complications: none    Indications and History:  The patient is a 64 y.o.  male with Multiple lung nodules.  The risks, benefits, complications, treatment options and expected outcomes were discussed with the patient.  The possibilities of reaction to medication, pulmonary aspiration, perforation of a viscus, bleeding, failure to diagnose a condition and creating a complication requiring transfusion or operation were discussed with the patient who freely signed the consent.  Time out was taken prior to the procedure.    Description of Procedure:    After the induction of general anesthesia, the patient was positioned supine and the Olympus BF type [id number] therapeutic bronchoscope was introduced through the endotracheal tube.  The scope was then passed into the trachea.     The trachea, mainstem bronchi, RUL, RML, Bronchus Intermedius, RLL, SANTIAGO, SANTIAGO upper division and lingula, and LLL, and all primary segmental airways were examined and were normal except as described below:    Endobronchial findings: minimal scattered endobronchial mucus  Trachea: Normal mucosa  Maggi: Sharp  Right main bronchus: Normal mucosa  Right upper lobe bronchus: Normal mucosa  Right middle lobe  bronchus: Normal mucosa  Right lower lobe bronchus: Normal mucosa  Left main bronchus: Normal mucosa  Left upper lobe bronchus: Normal mucosa  Left lower lobe bronchus: Normal mucosa    The conventional bronchoscope was removed .    Using the planning laptop and Planpoint software, the lesion of interest was identified, and marked, a pathway was generated, and anatomic borders were identified.The ION system was magnetically docked to the ETT. The shape sensing catheter with vision probe was introduced via the  into the ETT.    Registration was started by advancing the bronchoscope into the right and left mainstem bronchi. The main masha was confirmed by rotating the live-view image to match the navigation-view image of the main masha. Registration was completed by advancing the catheter into the identified the lobar bronchi. There was no significant visual divergence. . Using the , the shape sensing bronchoscope was advanced to the target lesion. The mobile C-Arm was brought in, and the vision probe was removed. A peripheral radial ultrasound probe was not utilized due to   Transbronchial needle aspirations of the target lesion were performed using an Intuitive Flexision 23 gauge needle and sent for routine cytology. The procedure was guided by fluoroscopy and radial ultrasound. Transbronchial needle aspiration technique was selected because the sampling site was not accessible using standard bronchoscopic techniques. 7 needle aspirations were obtained. Rapid On-Site Evaluation: cellular, macrophages    Transbronchial biopsies of the target lesion were performed  using a [employer] superDimension pre-marked biopsy forceps instrument and sent for histopathology examination. The procedure was guided by fluoroscopy and radial ultrasound. Transbronchial biopsy technique was selected because the sampling site was not visible endoscopically. 16 biopsy samples were obtained. Bronchioalveolar lavage  was then performed. 10 ml of iced saline was instilled through the shape sensing catheter. The catheter was connected to the suction syringe and the catheter was retracted slowly, producing an aspiration of 5 ml fluid which was bloody. The vision probe was reintruced to examine the biopsied area, and then the vision probe, and shape sensing catheter was extracted and catheter guide was disconnected.  The Olympus [id number] EBUS bronchoscope was introduced and the mediastinum was examined via linear ultrasound.  Findings: enlarged nodes at station 4r 1.3 cm, station 7 1.5 cm, station 4L 1 cm. TBNA under ultrasound guidance was collected using an Olympus VisiShot 2 22g needle from station 4r x3, station 7 x3, station 4r x1 with additional aspirations hindered by poor visualization, Rapid onsite evaluation: cellular.  Iced saline was instilled and aspirated in small aliquots between needle passes for hemostasis, fluid was kept for bronch wash. No ongoing bleeding was identified.  The bronchoscope was removed.    The patient was undocked from the ion robot arm.  The Patient was extubated and taken to the Recovery Room in satisfactory condition.      Milind Fowler MD at 14:02 CDT, 5/13/2024

## 2024-05-13 NOTE — ANESTHESIA PREPROCEDURE EVALUATION
Anesthesia Evaluation     NPO Solid Status: > 8 hours  NPO Liquid Status: > 8 hours           Airway   Mallampati: II  No difficulty expected  Dental      Pulmonary    (+) a smoker Former,shortness of breath, sleep apnea  Cardiovascular   Exercise tolerance: poor (<4 METS)    (+) hypertension, CAD, cardiac stents (1 stent placed 2007) more than 12 months ago , hyperlipidemia      Neuro/Psych  (-) seizures, TIA, CVA  GI/Hepatic/Renal/Endo    (-) liver disease, no renal disease, diabetes    Musculoskeletal     Abdominal    Substance History      OB/GYN          Other                          Anesthesia Plan    ASA 2     general     intravenous induction     Anesthetic plan, risks, benefits, and alternatives have been provided, discussed and informed consent has been obtained with: patient.        CODE STATUS:

## 2024-05-13 NOTE — ANESTHESIA PROCEDURE NOTES
Airway  Urgency: elective    Date/Time: 5/13/2024 12:57 PM  Airway not difficult    General Information and Staff    Patient location during procedure: OR  CRNA/CAA: Dominick Shukla CRNA    Indications and Patient Condition  Indications for airway management: airway protection    Preoxygenated: yes  Mask difficulty assessment: 1 - vent by mask    Final Airway Details  Final airway type: endotracheal airway      Successful airway: ETT  Cuffed: yes   Successful intubation technique: direct laryngoscopy  Facilitating devices/methods: intubating stylet  Blade: Parker  Blade size: 2  ETT size (mm): 8.0  Cormack-Lehane Classification: grade I - full view of glottis  Placement verified by: chest auscultation and capnometry   Measured from: lips  ETT/EBT  to lips (cm): 22  Number of attempts at approach: 1  Assessment: lips, teeth, and gum same as pre-op and atraumatic intubation

## 2024-05-15 LAB
BACTERIA SPEC RESP CULT: NORMAL
BEAKER LAB AP INTRAOPERATIVE CONSULTATION: NORMAL
CYTO UR: NORMAL
GRAM STN SPEC: NORMAL
LAB AP CASE REPORT: NORMAL
LAB AP DIAGNOSIS COMMENT: NORMAL
Lab: NORMAL
PATH REPORT.FINAL DX SPEC: NORMAL
PATH REPORT.GROSS SPEC: NORMAL

## 2024-05-15 NOTE — PROGRESS NOTES
I tried to call, got voice mail.  I left him a message to call back.  Let pt know this looked ok, result showed inflammation and we did not find any cancer cells.  Still waiting on microbiology.  Nothing else to do for now.  Try him again tomorrow if he does not call back today.  Keep follow up as planned to review results including the cultures, and to discuss further surveillance.

## 2024-05-16 ENCOUNTER — TELEPHONE (OUTPATIENT)
Dept: ONCOLOGY | Facility: CLINIC | Age: 64
End: 2024-05-16
Payer: COMMERCIAL

## 2024-05-16 NOTE — TELEPHONE ENCOUNTER
PT IS AWARE THAT ONE RESULT IS STILL PENDING IN THE BONE MARROW. WE WILL CALL WITH RESULTS WHEN EVERYTHING IS FINALIZED. HE V/U

## 2024-05-16 NOTE — TELEPHONE ENCOUNTER
Caller: Gregorio Callahan    Relationship: Self    Best call back number: [phone]    What test was performed: BONE MARROW    When was the test performed: 4/23/24    Where was the test performed:

## 2024-05-28 ENCOUNTER — DOCUMENTATION (OUTPATIENT)
Dept: ONCOLOGY | Facility: CLINIC | Age: 64
End: 2024-05-28
Payer: COMMERCIAL

## 2024-05-28 DIAGNOSIS — D61.818 PANCYTOPENIA: Primary | ICD-10-CM

## 2024-05-28 NOTE — PROGRESS NOTES
Spoke with Mr. Callahan this morning.  He states he is overall ok.  He gets fatigued quickly and has  shortness of breath. He is followed by pulmonology and is scheduled to follow up on Thursday May 30, 2024.      We discussed bone marrow results which are below.    FLOW CYTOMETRY: Partial CD56 on monocytes.  Expression of CD56, myeloid monocytes is a nonspecific finding that can be seen in either reactive/regenerative processes or neoplastic processes.    HEMATOPATHOLOGY: Nonnecrotizing granuloma present.  Hypercellular marrow for age (~60-70%) with relative myeloid hypoplasia, left shift, adequate megakaryocytes and no increase in blast and a benign lymphocytic aggregate.  Storage iron present.    Comment: The morphologic evaluation is significant for the presence of nonnecrotizing granuloma.  Concurrent flow cytometric immunophenotypic analysis of the peripheral blood sample laboratory revealed nonspecific monocytic findings with no increase in blasts or other significant abnormalities.  Clinical consideration should be given to secondary causes of nonnecrotizing granuloma including sarcoidosis among many others.  Additionally a clinical consideration should be also given to secondary causes of leukopenia and thrombocytopenia which may include autoimmune disorders and others.    CYTOGENICS: 47, XY, +7[2] / 46, XY[18].  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  Trisomy 7 is a rare chromosomal abnormality associated with myeloid disorder such as MDS and AML.    FISH for MDS:  Normal    INTELLIGEN MYELOID:          In addition, pt has had the following imaging / labs:     Pt had PET Scan on 03/27/2024    1.  Innumerable groundglass nodules and surrounding groundglass opacity in the lungs persist and exhibit low-grade for uptake. These may be infectious or neoplastic. These are likely too small for percutaneous biopsy.  2.   Multiple enlarged mediastinal and bilateral hilar lymph nodes are more intensely hypermetabolic. These could potentially be biopsied under EBUS guidance. These may also be infectious/reactive and short interval follow-up could be considered.  3.  Hypermetabolic lymph node at the level of the josie splenic vein confluence.    He had lung biopsies 05/13/2024  1.  Lung, left upper lobe, Ion biopsy:  Nonneoplastic lung parenchyma with hemorrhagic changes.     2.  Lung, left upper lobe, bronchoalveolar lavage:  Negative for malignant cells.  Benign bronchial cells and macrophages.     3.  Lung, left upper lobe, Ion aspiration:  Negative for malignant cells.  Benign bronchial cells, macrophages, and mixed inflammation.     4.  Lymph node, station 4R, EBUS:  Negative for malignant cells.  Polymorphous lymphocyte population and benign bronchial cells.     5.  Lymph node, station 7, EBUS:  Negative for malignant cells.  Polymorphous lymphocyte population.     6.  Lymph node, station 4L, EBUS:  Negative for malignant cells.  Scant polymorphous lymphocyte population     7.  Lung, bronchial washings:  Negative for malignant cells.  Benign bronchial cells, macrophages, and inflammation.    Labs:    4/23/24:  WBC 2.33, Hgb 13.2, Hct 39.6, Plt 91  3/19/24:  Iron 54, Ferritin 644, Sat 16%, TIBC 329    This case was reviewed with Dr Cortez who advised pt be referred to tertiary care at [place] for evaluation.  I discussed this with the patient today and he agreed.     Will send stat referral.   Will check labs.  Orders placed for CBC, CMP, Iron Profile, Ferritin, LILIANA and Anti DNA as autoimmune disorder was listed as differential.     Twila Wick, HCRISTIANA  05/28/2024

## 2024-05-30 ENCOUNTER — TELEPHONE (OUTPATIENT)
Dept: CARDIOLOGY | Facility: CLINIC | Age: 64
End: 2024-05-30
Payer: COMMERCIAL

## 2024-05-30 ENCOUNTER — OFFICE VISIT (OUTPATIENT)
Dept: PULMONOLOGY | Facility: CLINIC | Age: 64
End: 2024-05-30
Payer: COMMERCIAL

## 2024-05-30 VITALS
HEIGHT: 72 IN | HEART RATE: 64 BPM | BODY MASS INDEX: 30.2 KG/M2 | OXYGEN SATURATION: 95 % | SYSTOLIC BLOOD PRESSURE: 122 MMHG | DIASTOLIC BLOOD PRESSURE: 74 MMHG | WEIGHT: 223 LBS

## 2024-05-30 DIAGNOSIS — R06.09 EXERTIONAL DYSPNEA: Chronic | ICD-10-CM

## 2024-05-30 DIAGNOSIS — R59.0 MEDIASTINAL ADENOPATHY: Chronic | ICD-10-CM

## 2024-05-30 DIAGNOSIS — R91.8 MULTIPLE LUNG NODULES: Primary | Chronic | ICD-10-CM

## 2024-05-30 DIAGNOSIS — Z87.891 FORMER SMOKER: ICD-10-CM

## 2024-05-30 PROCEDURE — 99214 OFFICE O/P EST MOD 30 MIN: CPT | Performed by: NURSE PRACTITIONER

## 2024-05-30 RX ORDER — TRAZODONE HYDROCHLORIDE 50 MG/1
TABLET ORAL
Qty: 180 TABLET | Refills: 2 | Status: SHIPPED | OUTPATIENT
Start: 2024-05-30

## 2024-05-30 RX ORDER — TRAZODONE HYDROCHLORIDE 50 MG/1
TABLET ORAL
Qty: 180 TABLET | Refills: 3 | Status: CANCELLED | OUTPATIENT
Start: 2024-05-30

## 2024-05-30 RX ORDER — ALBUTEROL SULFATE AND BUDESONIDE 90; 80 UG/1; UG/1
2 AEROSOL, METERED RESPIRATORY (INHALATION)
Qty: 10.7 G | Refills: 3 | Status: SHIPPED | OUTPATIENT
Start: 2024-05-30

## 2024-05-30 NOTE — TELEPHONE ENCOUNTER
Caller: Gregorio Callahan    Relationship: Self    Best call back number: [phone]     Requested Prescriptions:   Requested Prescriptions     Pending Prescriptions Disp Refills    traZODone (DESYREL) 50 MG tablet 180 tablet 3        Pharmacy where request should be sent: [facility] #[id number] - [place], [place] - [address] [phone] [facility] [phone]      Last office visit with prescribing clinician: Visit date not found   Last telemedicine visit with prescribing clinician: Visit date not found   Next office visit with prescribing clinician: 11/15/2024     Additional details provided by patient: PT NEEDING DR. ISBELL TO TAKE OVER    Does the patient have less than a 3 day supply:  [] Yes  [x] No    Would you like a call back once the refill request has been completed: [] Yes [] No    If the office needs to give you a call back, can they leave a voicemail: [] Yes [] No    Espinoza Mejia Rep   05/30/24 13:23 CDT

## 2024-05-30 NOTE — TELEPHONE ENCOUNTER
Pt requesting a refill on trazodone 50mg (1-2 nightly).  Says he used to get refills from us but not sure how it got changed to another provider.    Pt states he has normally takes 2 nightly--do you want to leave directions the same or ok to change to 100mg nightly?

## 2024-05-30 NOTE — PROGRESS NOTES
"Chief Complaint  Multiple lung nodules    Subjective    History of Present Illness      Gregorio Callahan presents to [facility] for:    History of Present Illness   Follow-up after having underwent an Ion robotic bronchoscopy with EBUS by Dr. Fowler on 5/13/2024 for multiple lung nodules.  Pathology came back negative for malignancy, positive for inflammation.  Other cultures including respiratory culture, AFB and fungus look good.  He has mediastinal adenopathy, groundglass nodules and history of silica exposure.  He reports that he still has a lot of shortness of breath with exertion.  He is awaiting a hematology appointment at [place] in August that may yield some answers regarding this.  I reviewed his recent PFT results with he and his wife showing normal spirometry.  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  I am prescribing him an Airsupra inhaler to use as needed.  He will need a follow-up CT in 3 months to assure stability of lung nodules.  He stays up-to-date on vaccines.  Objective   Vital Signs:   /74   Pulse 64   Ht 182.9 cm (72\")   Wt 101 kg (223 lb)   SpO2 95% Comment: RA  BMI 30.24 kg/m²     Physical Exam  Vitals reviewed.   Constitutional:       Appearance: He is well-developed.   HENT:      Head: Normocephalic and atraumatic.   Eyes:      General: No scleral icterus.  Cardiovascular:      Rate and Rhythm: Normal rate and regular rhythm.   Abdominal:      General: There is no distension.   Musculoskeletal:         General: Normal range of motion.      Cervical back: Normal range of motion and neck supple.   Skin:     General: Skin is warm and dry.   Neurological:      Mental Status: He is alert and oriented to person, place, and time.   Psychiatric:         Mood and Affect: Mood normal.         Behavior: Behavior normal.        Result Review :   The following data " was reviewed by: CHRISTIANA Hansen on 05/30/2024:  Respiratory Culture - Wash, Bronchus (05/13/2024 13:44)  AFB Culture - Wash, Bronchus (05/13/2024 13:44)  KOH Prep - Wash, Bronchus (05/13/2024 13:44)  Fungus Culture - Wash, Bronchus (05/13/2024 13:44)  Tissue Pathology Exam (05/13/2024 13:17)  Fine Needle Aspiration (05/13/2024 13:15)  Results for orders placed in visit on 04/17/24    Spirometry    Narrative  Spirometry    Performed by: Prabhakar Xiao CMA  Authorized by: Milind Fowler MD  Pre Drug % Predicted  FVC: 81%  FEV1: 85%  FEF 25-75%: 97%  FEV1/FVC: 83.58%    Interpretation  Spirometry  Spirometry shows normal results. midflow is normal.  Electronically signed by Milind Fowler MD, 4/17/2024, 11:53 CDT             Assessment and Plan      Diagnoses and all orders for this visit:    1. Multiple lung nodules (Primary)  Comments:  With negative pathology from recent Ion bronchoscopy.  Follow-up CT scan in 3 months to follow nodules.  Orders:  -     CT Chest Without Contrast Diagnostic; Future    2. Mediastinal adenopathy  Comments:  Follow-up CT scan in 3 months to assure stability.  Orders:  -     CT Chest Without Contrast Diagnostic; Future    3. Former smoker  Comments:  Quit in 2007.  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    4. Exertional dyspnea  Comments:  Secondary to inflammation in the lungs?  Hematology issues?  Continue to monitor.  Follow-up with hematology at [place] in August.  Orders:  -     Albuterol-Budesonide (Airsupra) 90-80 MCG/ACT aerosol; Inhale 2 puffs 6 (Six) Times a Day.  Dispense: 10.7 g; Refill: 3        CHRISTIANA Hansen  5/30/2024  15:08 CDT    Follow Up   Return in about 3 months (around 8/30/2024) for CT scan.    Patient was given instructions and counseling regarding his condition or for health maintenance advice. Please see specific information pulled into the AVS if appropriate.

## 2024-06-24 ENCOUNTER — TELEPHONE (OUTPATIENT)
Dept: ONCOLOGY | Facility: CLINIC | Age: 64
End: 2024-06-24
Payer: COMMERCIAL

## 2024-06-24 LAB
FUNGUS WND CULT: NORMAL
MYCOBACTERIUM SPEC CULT: NORMAL
NIGHT BLUE STAIN TISS: NORMAL
NIGHT BLUE STAIN TISS: NORMAL

## 2024-06-24 NOTE — TELEPHONE ENCOUNTER
Dr Cortez received a call from Dr Shen today regarding the patients MDS.  They have evaluated the patient and feel he is stable for just observation at this time.  The lung bx he had was all negative and is being followed by Dr Fowler.  Dr Shen did advise if the patients ANC was below 1, Hgb less than 10 and platelets less than 100 to refer back to them.

## 2024-06-26 DIAGNOSIS — D64.9 ANEMIA, UNSPECIFIED TYPE: ICD-10-CM

## 2024-06-26 DIAGNOSIS — D72.819 LEUKOPENIA, UNSPECIFIED TYPE: Primary | ICD-10-CM

## 2024-06-26 NOTE — TELEPHONE ENCOUNTER
Based on this message, Can you get him back on schedule monthly labs for CBC  and see me in 3 months.

## 2024-08-06 ENCOUNTER — OFFICE VISIT (OUTPATIENT)
Dept: FAMILY MEDICINE CLINIC | Facility: CLINIC | Age: 64
End: 2024-08-06
Payer: COMMERCIAL

## 2024-08-06 VITALS
TEMPERATURE: 98 F | HEART RATE: 67 BPM | OXYGEN SATURATION: 93 % | HEIGHT: 72 IN | DIASTOLIC BLOOD PRESSURE: 78 MMHG | RESPIRATION RATE: 16 BRPM | BODY MASS INDEX: 29.91 KG/M2 | WEIGHT: 220.8 LBS | SYSTOLIC BLOOD PRESSURE: 129 MMHG

## 2024-08-06 DIAGNOSIS — R53.83 OTHER FATIGUE: ICD-10-CM

## 2024-08-06 DIAGNOSIS — E78.00 PURE HYPERCHOLESTEROLEMIA: Primary | ICD-10-CM

## 2024-08-06 PROCEDURE — 99213 OFFICE O/P EST LOW 20 MIN: CPT | Performed by: FAMILY MEDICINE

## 2024-08-06 NOTE — PROGRESS NOTES
Subjective   Gregorio Callahan is a 64 y.o. male.     History of Present Illness  64-year-old male with chronic lung disease probably related to environmental factors 6-month follow-up      The following portions of the patient's history were reviewed and updated as appropriate: allergies, current medications, past family history, past medical history, past social history, past surgical history, and problem list.    Review of Systems   Respiratory:  Positive for cough. Negative for shortness of breath.         The patient had COVID about 10 days ago-did not take any medication-feels like he is back to normal lung wise--scheduled for MRI of lungs in about 2 weeks   Cardiovascular:  Negative for chest pain and leg swelling.   Musculoskeletal:  Positive for arthralgias.       Objective   Physical Exam  Vitals and nursing note reviewed.   Constitutional:       Appearance: Normal appearance.   Eyes:      Extraocular Movements: Extraocular movements intact.      Pupils: Pupils are equal, round, and reactive to light.   Cardiovascular:      Rate and Rhythm: Normal rate and regular rhythm.   Pulmonary:      Effort: Pulmonary effort is normal.      Breath sounds: Normal breath sounds. No wheezing or rhonchi.   Musculoskeletal:      Right lower leg: No edema.      Left lower leg: No edema.   Skin:     General: Skin is warm and dry.   Neurological:      General: No focal deficit present.      Mental Status: He is alert and oriented to person, place, and time.   Psychiatric:         Mood and Affect: Mood normal.         Behavior: Behavior normal.         Thought Content: Thought content normal.         Judgment: Judgment normal.         Assessment & Plan   Diagnoses and all orders for this visit:    1. Pure hypercholesterolemia (Primary)  -     Comprehensive Metabolic Panel  -     Lipid Panel    2. Other fatigue  -     CBC & Differential         Plan above-if patient has flareup with fever chills chest discomfort will get chest  x-ray before MRI of lungs

## 2024-08-07 LAB
ALBUMIN SERPL-MCNC: 4.7 G/DL (ref 3.5–5.2)
ALBUMIN/GLOB SERPL: 2.2 G/DL
ALP SERPL-CCNC: 91 U/L (ref 39–117)
ALT SERPL-CCNC: 25 U/L (ref 1–41)
AST SERPL-CCNC: 31 U/L (ref 1–40)
BASOPHILS # BLD AUTO: ABNORMAL 10*3/UL
BILIRUB SERPL-MCNC: 0.7 MG/DL (ref 0–1.2)
BUN SERPL-MCNC: 15 MG/DL (ref 8–23)
BUN/CREAT SERPL: 14.3 (ref 7–25)
CALCIUM SERPL-MCNC: 9.4 MG/DL (ref 8.6–10.5)
CHLORIDE SERPL-SCNC: 106 MMOL/L (ref 98–107)
CHOLEST SERPL-MCNC: 114 MG/DL (ref 0–200)
CO2 SERPL-SCNC: 25 MMOL/L (ref 22–29)
CREAT SERPL-MCNC: 1.05 MG/DL (ref 0.76–1.27)
DIFFERENTIAL COMMENT: ABNORMAL
EGFRCR SERPLBLD CKD-EPI 2021: 79.8 ML/MIN/1.73
EOSINOPHIL # BLD AUTO: ABNORMAL 10*3/UL
EOSINOPHIL NFR BLD AUTO: ABNORMAL %
ERYTHROCYTE [DISTWIDTH] IN BLOOD BY AUTOMATED COUNT: 14.5 % (ref 12.3–15.4)
GLOBULIN SER CALC-MCNC: 2.1 GM/DL
GLUCOSE SERPL-MCNC: 87 MG/DL (ref 65–99)
HCT VFR BLD AUTO: 39.6 % (ref 37.5–51)
HDLC SERPL-MCNC: 35 MG/DL (ref 40–60)
HGB BLD-MCNC: 12.9 G/DL (ref 13–17.7)
LDLC SERPL CALC-MCNC: 52 MG/DL (ref 0–100)
LYMPHOCYTES # BLD AUTO: ABNORMAL 10*3/UL
LYMPHOCYTES # BLD MANUAL: 0.99 10*3/MM3 (ref 0.7–3.1)
LYMPHOCYTES NFR BLD AUTO: ABNORMAL %
LYMPHOCYTES NFR BLD MANUAL: 32.3 % (ref 19.6–45.3)
MCH RBC QN AUTO: 31.5 PG (ref 26.6–33)
MCHC RBC AUTO-ENTMCNC: 32.6 G/DL (ref 31.5–35.7)
MCV RBC AUTO: 96.8 FL (ref 79–97)
MONOCYTES # BLD MANUAL: 0.7 10*3/MM3 (ref 0.1–0.9)
MONOCYTES NFR BLD AUTO: ABNORMAL %
MONOCYTES NFR BLD MANUAL: 22.9 % (ref 5–12)
NEUTROPHILS # BLD MANUAL: 1.37 10*3/MM3 (ref 1.7–7)
NEUTROPHILS NFR BLD AUTO: ABNORMAL %
NEUTROPHILS NFR BLD MANUAL: 44.8 % (ref 42.7–76)
PLATELET # BLD AUTO: 120 10*3/MM3 (ref 140–450)
PLATELET BLD QL SMEAR: ABNORMAL
POTASSIUM SERPL-SCNC: 4.3 MMOL/L (ref 3.5–5.2)
PROT SERPL-MCNC: 6.8 G/DL (ref 6–8.5)
RBC # BLD AUTO: 4.09 10*6/MM3 (ref 4.14–5.8)
RBC MORPH BLD: ABNORMAL
SODIUM SERPL-SCNC: 144 MMOL/L (ref 136–145)
TRIGL SERPL-MCNC: 155 MG/DL (ref 0–150)
VLDLC SERPL CALC-MCNC: 27 MG/DL (ref 5–40)
WBC # BLD AUTO: 3.06 10*3/MM3 (ref 3.4–10.8)

## 2024-08-28 ENCOUNTER — HOSPITAL ENCOUNTER (OUTPATIENT)
Dept: CT IMAGING | Facility: HOSPITAL | Age: 64
Discharge: HOME | End: 2024-08-28
Admitting: NURSE PRACTITIONER
Payer: COMMERCIAL

## 2024-08-28 ENCOUNTER — OFFICE VISIT (OUTPATIENT)
Dept: PULMONOLOGY | Facility: CLINIC | Age: 64
End: 2024-08-28
Payer: COMMERCIAL

## 2024-08-28 VITALS
OXYGEN SATURATION: 99 % | SYSTOLIC BLOOD PRESSURE: 118 MMHG | BODY MASS INDEX: 30.2 KG/M2 | HEART RATE: 92 BPM | DIASTOLIC BLOOD PRESSURE: 74 MMHG | WEIGHT: 223 LBS | HEIGHT: 72 IN

## 2024-08-28 DIAGNOSIS — R06.09 EXERTIONAL DYSPNEA: Chronic | ICD-10-CM

## 2024-08-28 DIAGNOSIS — R59.0 MEDIASTINAL ADENOPATHY: Chronic | ICD-10-CM

## 2024-08-28 DIAGNOSIS — R91.8 MULTIPLE LUNG NODULES: Primary | Chronic | ICD-10-CM

## 2024-08-28 DIAGNOSIS — R91.8 MULTIPLE LUNG NODULES: Chronic | ICD-10-CM

## 2024-08-28 DIAGNOSIS — Z87.891 FORMER SMOKER: Chronic | ICD-10-CM

## 2024-08-28 PROCEDURE — 71250 CT THORAX DX C-: CPT

## 2024-08-28 PROCEDURE — 99214 OFFICE O/P EST MOD 30 MIN: CPT | Performed by: NURSE PRACTITIONER

## 2024-08-28 NOTE — PROGRESS NOTES
"Chief Complaint  Multiple lung nodules    Subjective    History of Present Illness      Gregorio Callahan presents to [facility] for:    History of Present Illness   Follow-up after having a CT scan this morning.  He has stable numerous lung nodules with stable mediastinal adenopathy.  He underwent an Ion bronchoscopy in May that was negative for concerning findings.  He is a former smoker who quit in 2007.  He has chronic exertional dyspnea.  He states that his pulmonary status is \"no better but no worse\".  He uses Airsupra as needed.  The patient and I reviewed his CT scan together and he is agreeable to a follow-up CT in 6 months.  The radiologist did note cardiac calcifications on today's CT scan and he has an appointment with his cardiologist in November.  He continues to follow with hematology at [place].  He states up-to-date on vaccines.  Objective   Vital Signs:   /74   Pulse 92   Ht 182.9 cm (72\")   Wt 101 kg (223 lb)   SpO2 99% Comment: RA  BMI 30.24 kg/m²     Physical Exam  Vitals reviewed.   Constitutional:       Appearance: He is well-developed.   HENT:      Head: Normocephalic and atraumatic.   Eyes:      General: No scleral icterus.  Cardiovascular:      Rate and Rhythm: Normal rate and regular rhythm.   Pulmonary:      Effort: Pulmonary effort is normal.      Breath sounds: No wheezing, rhonchi or rales.   Abdominal:      General: There is no distension.   Musculoskeletal:         General: Normal range of motion.      Cervical back: Normal range of motion and neck supple.   Skin:     General: Skin is warm and dry.   Neurological:      Mental Status: He is alert and oriented to person, place, and time.   Psychiatric:         Mood and Affect: Mood normal.         Behavior: Behavior normal.        Result Review :   The following data was reviewed by: CHRISTIANA Hansen on 08/28/2024:  CT Chest Without Contrast Diagnostic " (08/28/2024 07:28)   Results for orders placed in visit on 04/17/24    Spirometry    Narrative  Spirometry    Performed by: Prabhakar Xiao CMA  Authorized by: Milind Fowler MD  Pre Drug % Predicted  FVC: 81%  FEV1: 85%  FEF 25-75%: 97%  FEV1/FVC: 83.58%    Interpretation  Spirometry  Spirometry shows normal results. midflow is normal.  Electronically signed by Milind Fowler MD, 4/17/2024, 11:53 CDT               Assessment and Plan      Diagnoses and all orders for this visit:    1. Multiple lung nodules (Primary)  Comments:  Stable on today's CT scan.  He is status post Ion bronchoscopy from May 2024 with negative findings.  Continue to monitor.  Will plan for f/u CT in 6 months.  Orders:  -     CT Chest Without Contrast Diagnostic; Future    2. Former smoker  Comments:  He quit in 2007.  He is no longer a candidate for annual LDCT.  Orders:  -     CT Chest Without Contrast Diagnostic; Future    3. Mediastinal adenopathy  Comments:  Stable.  Continue to monitor.    4. Exertional dyspnea  Comments:  Chronic but stable.  Airsupra as needed.      Fausto Caldwell, APRN  8/28/2024  09:51 CDT    Follow Up   Return in about 6 months (around 2/28/2025) for CT scan.    Patient was given instructions and counseling regarding his condition or for health maintenance advice. Please see specific information pulled into the AVS if appropriate.

## 2024-08-28 NOTE — LETTER
"August 28, 2024     Ajith Barrientos MD  [address] [facility] [facility] [address]    Patient: Gregorio Callahan   YOB: 1960   Date of Visit: 8/28/2024     Dear Dr. Iliana MD:    Thank you for referring Gregorio Callahan to me for evaluation. Below are the relevant portions of my assessment and plan of care.    If you have questions, please do not hesitate to call me. I look forward to following Gregorio along with you.         Sincerely,        CHRISTIANA Hansen        CC: Wilner Chew MD      Progress Notes:  Chief Complaint  Multiple lung nodules    Subjective   History of Present Illness      Gregorio Callahan presents to [facility] for:    History of Present Illness   Follow-up after having a CT scan this morning.  He has stable numerous lung nodules with stable mediastinal adenopathy.  He underwent an Ion bronchoscopy in May that was negative for concerning findings.  He is a former smoker who quit in 2007.  He has chronic exertional dyspnea.  He states that his pulmonary status is \"no better but no worse\".  He uses Airsupra as needed.  The patient and I reviewed his CT scan together and he is agreeable to a follow-up CT in 6 months.  The radiologist did note cardiac calcifications on today's CT scan and he has an appointment with his cardiologist in November.  He continues to follow with hematology at [place].  He states up-to-date on vaccines.  Objective  Vital Signs:   /74   Pulse 92   Ht 182.9 cm (72\")   Wt 101 kg (223 lb)   SpO2 99% Comment: RA  BMI 30.24 kg/m²     Physical Exam  Vitals reviewed.   Constitutional:       Appearance: He is well-developed.   HENT:      Head: Normocephalic and atraumatic.   Eyes:      General: No scleral icterus.  Cardiovascular:      Rate and Rhythm: Normal rate and regular rhythm.   Pulmonary:      Effort: Pulmonary effort is normal.      Breath sounds: No wheezing, rhonchi or rales. "   Abdominal:      General: There is no distension.   Musculoskeletal:         General: Normal range of motion.      Cervical back: Normal range of motion and neck supple.   Skin:     General: Skin is warm and dry.   Neurological:      Mental Status: He is alert and oriented to person, place, and time.   Psychiatric:         Mood and Affect: Mood normal.         Behavior: Behavior normal.        Result Review:   The following data was reviewed by: CHRISTIANA Hansen on 08/28/2024:  CT Chest Without Contrast Diagnostic (08/28/2024 07:28)   Results for orders placed in visit on 04/17/24    Spirometry    Narrative  Spirometry    Performed by: Prabhakar Xiao CMA  Authorized by: Milind Fowler MD  Pre Drug % Predicted  FVC: 81%  FEV1: 85%  FEF 25-75%: 97%  FEV1/FVC: 83.58%    Interpretation  Spirometry  Spirometry shows normal results. midflow is normal.  Electronically signed by Milind Fowler MD, 4/17/2024, 11:53 CDT               Assessment and Plan      Diagnoses and all orders for this visit:    1. Multiple lung nodules (Primary)  Comments:  Stable on today's CT scan.  He is status post Ion bronchoscopy from May 2024 with negative findings.  Continue to monitor.  Will plan for f/u CT in 6 months.  Orders:  -     CT Chest Without Contrast Diagnostic; Future    2. Former smoker  Comments:  He quit in 2007.  He is no longer a candidate for annual LDCT.  Orders:  -     CT Chest Without Contrast Diagnostic; Future    3. Mediastinal adenopathy  Comments:  Stable.  Continue to monitor.    4. Exertional dyspnea  Comments:  Chronic but stable.  Airsupra as needed.      CHRISTIANA Hansen  8/28/2024  09:51 CDT    Follow Up   Return in about 6 months (around 2/28/2025) for CT scan.    Patient was given instructions and counseling regarding his condition or for health maintenance advice. Please see specific information pulled into the AVS if appropriate.

## 2024-09-11 ENCOUNTER — TELEPHONE (OUTPATIENT)
Dept: ONCOLOGY | Facility: CLINIC | Age: 64
End: 2024-09-11

## 2024-09-11 DIAGNOSIS — D72.819 LEUKOPENIA, UNSPECIFIED TYPE: Primary | ICD-10-CM

## 2024-09-11 DIAGNOSIS — D61.818 PANCYTOPENIA: ICD-10-CM

## 2024-09-11 DIAGNOSIS — D64.9 ANEMIA, UNSPECIFIED TYPE: ICD-10-CM

## 2024-09-11 DIAGNOSIS — D69.6 THROMBOCYTOPENIA: ICD-10-CM

## 2024-09-11 NOTE — TELEPHONE ENCOUNTER
Caller: Gregorio Callahan    Relationship: Self    Best call back number: [phone]     What is the best time to reach you: ASAP    Who are you requesting to speak with (clinical staff, provider,  specific staff member): CLINICAL      What was the call regarding: PT JUST HAD A CBC W/ DIFF DONE AT DR CLEMENT'S OFFICE ON AUG. 6.  DOES HE NEED TO DO ANOTHER LAB PRIOR TO SEEING DON ON 9/26, OR WILL THAT LAB FROM AUG.6 BE SUFFICIENT?  PLEASE CALL PT TO ADVISE.

## 2024-09-17 LAB
ALBUMIN SERPL-MCNC: 4.7 G/DL (ref 3.5–5.2)
ALBUMIN/GLOB SERPL: 1.9 G/DL
ALP SERPL-CCNC: 99 U/L (ref 39–117)
ALT SERPL-CCNC: 22 U/L (ref 1–41)
ANA SER QL: POSITIVE
AST SERPL-CCNC: 31 U/L (ref 1–40)
BASOPHILS # BLD AUTO: ABNORMAL 10*3/UL
BASOPHILS # BLD MANUAL: 0.06 10*3/MM3 (ref 0–0.2)
BASOPHILS NFR BLD MANUAL: 2 % (ref 0–1.5)
BILIRUB SERPL-MCNC: 0.7 MG/DL (ref 0–1.2)
BUN SERPL-MCNC: 12 MG/DL (ref 8–23)
BUN/CREAT SERPL: 11.5 (ref 7–25)
CALCIUM SERPL-MCNC: 9.6 MG/DL (ref 8.6–10.5)
CHLORIDE SERPL-SCNC: 102 MMOL/L (ref 98–107)
CO2 SERPL-SCNC: 26.4 MMOL/L (ref 22–29)
CREAT SERPL-MCNC: 1.04 MG/DL (ref 0.76–1.27)
DIFFERENTIAL COMMENT: ABNORMAL
DSDNA AB SER-ACNC: <1 IU/ML (ref 0–9)
EGFRCR SERPLBLD CKD-EPI 2021: 80.7 ML/MIN/1.73
EOSINOPHIL # BLD AUTO: ABNORMAL 10*3/UL
EOSINOPHIL # BLD MANUAL: 0 10*3/MM3 (ref 0–0.4)
EOSINOPHIL NFR BLD AUTO: ABNORMAL %
EOSINOPHIL NFR BLD MANUAL: 0 % (ref 0.3–6.2)
ERYTHROCYTE [DISTWIDTH] IN BLOOD BY AUTOMATED COUNT: 14.1 % (ref 12.3–15.4)
FERRITIN SERPL-MCNC: 506 NG/ML (ref 30–400)
GLOBULIN SER CALC-MCNC: 2.5 GM/DL
GLUCOSE SERPL-MCNC: 89 MG/DL (ref 65–99)
HCT VFR BLD AUTO: 41.8 % (ref 37.5–51)
HGB BLD-MCNC: 14 G/DL (ref 13–17.7)
IRON SATN MFR SERPL: 28 % (ref 20–50)
IRON SERPL-MCNC: 99 MCG/DL (ref 59–158)
LYMPHOCYTES # BLD AUTO: ABNORMAL 10*3/UL
LYMPHOCYTES # BLD MANUAL: 0.97 10*3/MM3 (ref 0.7–3.1)
LYMPHOCYTES NFR BLD AUTO: ABNORMAL %
LYMPHOCYTES NFR BLD MANUAL: 32 % (ref 19.6–45.3)
Lab: NORMAL
MCH RBC QN AUTO: 32.3 PG (ref 26.6–33)
MCHC RBC AUTO-ENTMCNC: 33.5 G/DL (ref 31.5–35.7)
MCV RBC AUTO: 96.3 FL (ref 79–97)
MONOCYTES # BLD MANUAL: 0.73 10*3/MM3 (ref 0.1–0.9)
MONOCYTES NFR BLD AUTO: ABNORMAL %
MONOCYTES NFR BLD MANUAL: 24 % (ref 5–12)
NEUTROPHILS # BLD MANUAL: 1.28 10*3/MM3 (ref 1.7–7)
NEUTROPHILS NFR BLD AUTO: ABNORMAL %
NEUTROPHILS NFR BLD MANUAL: 42 % (ref 42.7–76)
PLATELET # BLD AUTO: 109 10*3/MM3 (ref 140–450)
PLATELET BLD QL SMEAR: ABNORMAL
POTASSIUM SERPL-SCNC: 4.3 MMOL/L (ref 3.5–5.2)
PROT SERPL-MCNC: 7.2 G/DL (ref 6–8.5)
RBC # BLD AUTO: 4.34 10*6/MM3 (ref 4.14–5.8)
RBC MORPH BLD: ABNORMAL
SODIUM SERPL-SCNC: 139 MMOL/L (ref 136–145)
TIBC SERPL-MCNC: 356 MCG/DL
UIBC SERPL-MCNC: 257 MCG/DL (ref 112–346)
WBC # BLD AUTO: 3.04 10*3/MM3 (ref 3.4–10.8)

## 2024-09-26 ENCOUNTER — OFFICE VISIT (OUTPATIENT)
Dept: ONCOLOGY | Facility: CLINIC | Age: 64
End: 2024-09-26
Payer: COMMERCIAL

## 2024-09-26 VITALS
OXYGEN SATURATION: 94 % | SYSTOLIC BLOOD PRESSURE: 122 MMHG | DIASTOLIC BLOOD PRESSURE: 76 MMHG | HEART RATE: 64 BPM | RESPIRATION RATE: 16 BRPM | WEIGHT: 222.8 LBS | TEMPERATURE: 97 F | BODY MASS INDEX: 30.18 KG/M2 | HEIGHT: 72 IN

## 2024-09-26 DIAGNOSIS — D46.9 MDS (MYELODYSPLASTIC SYNDROME): Primary | ICD-10-CM

## 2024-09-26 NOTE — PROGRESS NOTES
[facility] HEMATOLOGY & ONCOLOGY  [address]  [place] [address] [phone]    Patient Name: Gregorio Callahan  Encounter Date: 09/26/2024   YOB: 1960  Patient Number: [id number]     PROGRESS NOTE   HISTORY OF PRESENT ILLNESS: Gregorio Callahan is a 64 y.o. male is followed by this office for MDS.   History is considered to be accurate.    He has health history significant for HTN, CAD, Hyperlipidemia, Hx of MI (2007), Ex Tobacco user:  quit in 2007     He had colonoscopy 3/21/22:   - The entire examined colon is normal on direct and retroflexion views. No specimens collected.  10 year recall.      Drinks beer appox a case per month month    Lost 5 pounds in 6 weeks unintentionally  Reports cough, Night sweats for the past 4  weeks, light headed.    Pt was seen in consultation on 3/7/24.  Ordered CT Neck, Chest, Abdomen, Pelvis     CT Neck 3/15/24  1. No neck mass or evidence of lymphadenopathy. No acute findings.   2. Questionable cerebellar tonsillar ectopia and possible Chiari I malformation, partially obscured by streak artifact. Consider follow-up with MRI of the brain.   CT Chest 3/15/24  1. Innumerable bilateral groundglass and solid pulmonary nodules, some of which appear cavitary. This has a broad differential which would include septic emboli, metastatic disease, atypical infection (fungal mycobacterial) or vasculitis.  2. Mild mediastinal adenopathy.  3. Borderline heart size with heavily calcified coronary arteries versus stent material.   4. Liver contour appears somewhat nodular. There may be mild splenomegaly. Correlate for chronic liver disease.  5. Indeterminate round sclerotic focus in the LEFT posterior fourth rib.  Recommend correlation with serum PSA and consider nuclear medicine bone scan if clinically indicated.    CT Abdomen 3/15/24  No acute findings in the abdomen/pelvis.    Minimal ectasia of the infrarenal  abdominal aorta measuring up to 2.6 cm.    Per radiologist's recommendation, pt went to ER 3/16/24  to rule out septic emboli.  Preliminary blood culture negative.  Weight is stable.  Urgency and headaches have improved.    Still has fatigue and shortness of breath       FISH for MDS negative.  SPEP with no M Ángel.  Flow Cytometry showed increased monocytes 25% of leukocytes. Elevated monocytes could be related to decreased neutrophils at [facility] has been normal .    PET Scan 3/27/24  1.  Innumerable groundglass nodules and surrounding groundglass opacity in the lungs persist and exhibit low-grade for uptake. These may be infectious or neoplastic. These are likely too small for percutaneous biopsy.  2.  Multiple enlarged mediastinal and bilateral hilar lymph nodes are more intensely hypermetabolic. These could potentially be biopsied under EBUS guidance. These may also be infectious/reactive and short interval follow-up could be considered.  3.  Hypermetabolic lymph node at the level of the josie splenic vein confluence.      INTERVAL HISTORY     Pt presents to clinic today for continued follow up.   He states some days he feels tired and dizzy but that he his feeling ok today.         He has no acute complaints today.  He had labs drawn and results were reviewed with him in office.          LABS    Lab Results - Last 18 Months   Lab Units 09/16/24  0806 08/06/24  0723 05/30/24  1152 04/23/24  0835 04/08/24  0751 03/19/24  0727 03/16/24  1402 03/07/24  1253 03/07/24  1253 02/01/24  0717   HEMOGLOBIN g/dL 14.0 12.9* 13.2 13.2 12.3* 12.2* 12.3*   < > 12.2* 13.2   HEMATOCRIT % 41.8 39.6 38.7 39.6 37.4* 37.1* 37.5   < > 37.3* 39.5   MCV fL 96.3 96.8 90 92.3 92.3 91.8 93.1   < > 92.1 95.0   WBC 10*3/mm3 3.04* 3.06* 3.1* 2.33* 2.01* 3.05* 2.81*   < > 3.64 2.59*   RDW % 14.1 14.5 14.5 14.9 15.3 15.4 14.7   < > 14.7 14.4   MPV fL  --   --   --  11.0 10.4 10.0 10.3  --  9.8  --    PLATELETS 10*3/mm3 109* 120* 107* 91* 99* 141  157   < > 163 94*   IMM GRAN % %  --   --   --   --   --   --  0.7*  --   --   --    NEUTROS ABS 10*3/mm3 1.28* 1.37* 1.2* 0.91* 0.83* 1.26* 1.46*   < > 1.86 0.49*   LYMPHS ABS 10*3/mm3 CANCELED  0.97 CANCELED  0.99 1.0  --  0.70  --  0.85   < >  --  CANCELED  1.11   MONOS ABS 10*3/mm3 0.73 0.70 0.8  --  0.45  --  0.46  --   --  0.96*   EOS ABS  CANCELED CANCELED 0.0  --  0.01  --  0.01   < > 0.07 CANCELED   EOSINOPHIL ABS 10*3/mm3 0.00  --   --   --   --   --   --   --   --  0.03   EOSINOPHIL % % 0.0*  --   --   --   --   --   --   --   --  1.1   BASOS ABS 10*3/mm3 CANCELED  0.06 CANCELED 0.0 0.02 0.01  --  0.01   < >  --  CANCELED   IMMATURE GRANS (ABS) 10*3/mm3  --   --   --   --   --   --  0.02  --   --   --    NRBC /100 WBC  --   --   --   --   --   --  0.0  --  1.0*  --    NEUTROPHIL % % 42.0* 44.8  --  39.0*  --  41.4*  --   --  46.0 19.1*   MONOCYTES % % 24.0* 22.9*  --  21.0*  --  25.3*  --   --  26.0* 37.1*   BASOPHIL % % 2.0*  --   --  1.0  --   --   --   --   --   --    ATYP LYMPH % %  --   --   --  11.0*  --  15.2*  --   --  2.0  --    ANISOCYTOSIS   --   --   --  Slight/1+  --  Slight/1+  --   --  Slight/1+  --    GIANT PLT   --   --   --   --   --   --   --   --  Slight/1+  --     < > = values in this interval not displayed.       Lab Results - Last 18 Months   Lab Units 09/16/24  0806 08/06/24  0723 05/30/24  1152 05/06/24  1258 04/08/24  0751 03/19/24  0727 03/16/24  1402 03/15/24  1626 03/07/24  1253   GLUCOSE mg/dL 89 87 95 132* 135* 76 125*  --  85   SODIUM mmol/L 139 144 137 140 141 139 140  --  139   POTASSIUM mmol/L 4.3 4.3 3.8 4.0 3.8 4.3 4.1  --  4.6   CO2 mmol/L 26.4 25.0 22 26.0 27.0 29.0 28.0  --  27.0   CHLORIDE mmol/L 102 106 102 105 106 101 105  --  100   ANION GAP mmol/L  --   --   --  9.0 8.0 9.0 7.0  --  12.0   CREATININE mg/dL 1.04 1.05 1.06 0.85 0.88 0.82 0.79   < > 0.90   BUN mg/dL 12 15 18 12 9 11 13  --  13   BUN / CREAT RATIO  11.5 14.3 17 14.1 10.2 13.4 16.5  --  14.4    CALCIUM mg/dL 9.6 9.4 9.0 9.1 9.1 9.3 9.3  --  9.1   ALK PHOS U/L 99 91 89  --  88 88 110  --  88   TOTAL PROTEIN g/dL  --   --   --   --  6.8 7.2 7.4  --  7.8   ALT (SGPT) U/L 22 25 23  --  19 32 35  --  65*   AST (SGOT) U/L 31 31 34  --  26 28 29  --  52*   BILIRUBIN mg/dL 0.7 0.7 0.7  --  0.5 0.5 0.3  --  0.7   ALBUMIN g/dL 4.7 4.7 4.7  --  4.4 4.3  3.8 4.4  --  4.4   GLOBULIN gm/dL  --   --   --   --  2.4 2.9 3.0  --  3.4    < > = values in this interval not displayed.       Lab Results - Last 18 Months   Lab Units 04/08/24  0751 03/19/24  0727 03/07/24  1253   M-SPIKE g/dL  --  Not Observed  --    KAPPA/LAMBDA RATIO, S   --  1.56  --    FREE LAMBDA LIGHT CHAINS mg/L  --  15.8  --    IG KAPPA FREE LIGHT CHAIN mg/L  --  24.7*  --    CEA ng/mL 1.65  --   --    LDH U/L  --  365* 492*   REFERENCE LAB REPORT   --   --  See Attached Report       Lab Results - Last 18 Months   Lab Units 09/16/24  0806 05/30/24  1152 03/19/24  0727 03/07/24  1253 02/01/24  0717 08/01/23  0707   IRON mcg/dL  --   --  54* 33*  --   --    TIBC mcg/dL 356 290 329 288*  --   --    IRON SATURATION % 28 26  --   --   --   --    IRON SATURATION (TSAT) %  --   --  16* 11*  --   --    FERRITIN ng/mL 506.00* 428* 644.80* 1,226.00*  --   --    TSH uIU/mL  --   --   --   --  2.180 2.700   FOLATE ng/mL  --   --   --  15.10  --   --          PAST MEDICAL HISTORY:  ALLERGIES:  No Known Allergies  CURRENT MEDICATIONS:  Outpatient Encounter Medications as of 9/26/2024   Medication Sig Dispense Refill    Albuterol-Budesonide (Airsupra) 90-80 MCG/ACT aerosol Inhale 2 puffs 6 (Six) Times a Day. 10.7 g 3    aspirin 81 MG EC tablet Take 1 tablet by mouth Daily.      atorvastatin (LIPITOR) 80 MG tablet Take 1 tablet by mouth Daily. 90 tablet 3    citalopram (CeleXA) 40 MG tablet TAKE 1 TABLET BY MOUTH EVERY DAY 90 tablet 3    ezetimibe (ZETIA) 10 MG tablet Take 1 tablet by mouth Daily for 360 days. 90 tablet 3    lisinopril (PRINIVIL,ZESTRIL) 5 MG tablet  Take 1 tablet by mouth Daily. 90 tablet 3    nitroglycerin (NITROSTAT) 0.4 MG SL tablet TAKE 1 TABLET BY MOUTH EVERY 5 MINUTES UP TO 3 TIMES AS NEEDED FOR CHEST PAIN. 25 tablet 2    traZODone (DESYREL) 50 MG tablet TAKE 1 OR 2 TABLETS BY MOUTH AT BEDTIME AS NEEDED 180 tablet 3    traZODone (DESYREL) 50 MG tablet Take 1-2 tablets nightly for sleep. 180 tablet 2    vitamin B-12 (CYANOCOBALAMIN) 1000 MCG tablet Take 1 tablet by mouth Daily.       No facility-administered encounter medications on file as of 9/26/2024.     ADULT ILLNESSES:  Patient Active Problem List   Diagnosis Code    Hyperlipidemia, mild E78.5    Benign essential hypertension I10    CAD in native artery I25.10    H/O acute myocardial infarction I25.2    History of PTCA Z98.61    SZYMANSKI (dyspnea on exertion) R06.09    Encounter for screening for malignant neoplasm of colon Z12.11    Multiple lung nodules R91.8    Mediastinal adenopathy R59.0       HEALTH MAINTENANCE ITEMS:  Health Maintenance Due   Topic Date Due    INFLUENZA VACCINE  08/01/2024       <no information>  Last Completed Colonoscopy            COLORECTAL CANCER SCREENING (COLONOSCOPY - Every 10 Years) Next due on 3/21/2032      03/14/2024  Occult Blood X 3, Stool - Stool, Per Rectum    03/21/2022  Surgical Procedure: COLONOSCOPY    03/21/2022  COLONOSCOPY    10/23/2018  Cologuard - Stool, Per Rectum    04/05/2011  COLONOSCOPY (Done)    Only the first 5 history entries have been loaded, but more history exists.                  Immunization History   Administered Date(s) Administered    COVID-19 (MODERNA) 1st,2nd,3rd Dose Monovalent 03/16/2021, 04/13/2021, 11/11/2021    COVID-19 (PFIZER) BIVALENT 12+YRS 10/25/2022    COVID-19 F23 (PFIZER) 12YRS+ (COMIRNATY) 10/19/2023    Flu Vaccine Intradermal Quad 18-64YR 10/18/2017    Flu Vaccine Quad PF >36MO 11/12/2016    Fluad Quad 65+ 08/28/2020    Fluzone (or Fluarix & Flulaval for VFC) >6mos 10/25/2021, 10/25/2022, 10/11/2023    Pneumococcal  Conjugate 13-Valent (PCV13) 08/10/2016    Pneumococcal Polysaccharide (PPSV23) 10/23/2018    Shingrix 2019, 2020    Tdap 10/18/2017    flucelvax quad pfs =>4 YRS 10/23/2018, 2019     Last Completed Mammogram       This patient has no relevant Health Maintenance data.              FAMILY HISTORY:  Family History   Problem Relation Age of Onset    Heart disease Mother     Alzheimer's disease Father     Heart attack Sister     Heart attack Brother     No Known Problems Maternal Grandmother     No Known Problems Maternal Grandfather     No Known Problems Paternal Grandmother     No Known Problems Paternal Grandfather     Colon cancer Neg Hx     Colon polyps Neg Hx      SOCIAL HISTORY:  Social History     Socioeconomic History    Marital status:    Tobacco Use    Smoking status: Former     Current packs/day: 0.00     Average packs/day: 3.0 packs/day for 30.0 years (90.0 ttl pk-yrs)     Types: Cigarettes     Start date: 1977     Quit date: 2007     Years since quittin.7    Smokeless tobacco: Never    Tobacco comments:     age quit smokin   Vaping Use    Vaping status: Never Used   Substance and Sexual Activity    Alcohol use: Yes     Comment: Occasional alcohol use. Drinks beer.    Drug use: No    Sexual activity: Defer       REVIEW OF SYSTEMS:  Review of Systems   Constitutional:  Positive for fatigue and unexpected weight loss (5 pounds in 4 weeks, pt was on vacation and ate welll). Negative for activity change, appetite change, fever and unexpected weight gain.   HENT:  Negative for dental problem, facial swelling, swollen glands and trouble swallowing.    Eyes:  Negative for double vision and discharge.   Respiratory:  Positive for shortness of breath (Chronic but worsening). Negative for cough and wheezing.    Cardiovascular:  Negative for chest pain, palpitations and leg swelling.   Gastrointestinal:  Negative for abdominal pain, blood in stool, nausea and vomiting.  "  Endocrine: Negative.    Genitourinary:  Positive for urgency (Acute) and urinary incontinence. Negative for dysuria and hematuria.   Musculoskeletal:  Negative for arthralgias and myalgias.   Skin:  Negative for rash, skin lesions and wound.   Allergic/Immunologic: Negative for immunocompromised state.   Neurological:  Positive for headache (intermittent, rapid pain to right side of head). Negative for speech difficulty, light-headedness, memory problem and confusion.   Hematological:  Negative for adenopathy.   Psychiatric/Behavioral:  Negative for self-injury, suicidal ideas and depressed mood. The patient is not nervous/anxious.        /76   Pulse 64   Temp 97 °F (36.1 °C)   Resp 16   Ht 182.9 cm (72\")   Wt 101 kg (222 lb 12.8 oz)   SpO2 94%   BMI 30.22 kg/m²  Body surface area is 2.23 meters squared.    Pain Score    09/26/24 1003   PainSc: 0-No pain          Physical Exam  Constitutional:       Appearance: Normal appearance.   HENT:      Head: Normocephalic and atraumatic.   Cardiovascular:      Rate and Rhythm: Normal rate and regular rhythm.   Pulmonary:      Effort: Pulmonary effort is normal.      Breath sounds: Normal breath sounds.   Abdominal:      General: Bowel sounds are normal.      Palpations: Abdomen is soft.   Musculoskeletal:      Right lower leg: No edema.      Left lower leg: No edema.   Skin:     General: Skin is warm and dry.   Neurological:      Mental Status: He is alert and oriented to person, place, and time.   Psychiatric:         Attention and Perception: Attention normal.         Mood and Affect: Mood normal.         Judgment: Judgment normal.         Gregorio Callahan reports a pain score of 0.  Given his pain assessment as noted, treatment options were discussed and the following options were decided upon as a follow-up plan to address the patient's pain:  no intervention indicated .      ASSESSMENT / PLAN:    1. MDS (myelodysplastic syndrome)      2.  MDS  -Bone marrow " 4/23/2024: Findings consistent with myelodysplastic neoplasm with low blast count  Flow cytometry partial CD56 on monocytes  Hematopathology: None necrotizing granuloma  Hypercellular marrow for age with relative myeloid hypoplasia, left shift, adequate megakaryocytes and no increase in blast and a benign lymphocyte aggregate  Storage iron present  Normal FISH for MDS  Cytogenics: Abnormal male karyotype.  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 Goldstein at [facility] who felt pt at low risk and not a candidate for treatment considering his stable cell counts.  When cytopenias progress, can refer pt back to treatment recommendations or consideration for transplant.  -Labs 9/16/24:  WBC 3.04, Hgb 14.0, Hct 41.8, Plt 109, ANC 1.28  -Stable for observation       2.  Lung Nodules on CT Scan   CT Chest 3/15/24  1. Innumerable bilateral groundglass and solid pulmonary nodules, some of which appear cavitary. This has a broad differential which would include septic emboli, metastatic disease, atypical infection (fungal mycobacterial) or vasculitis.  2. Mild mediastinal adenopathy.  3. Borderline heart size with heavily calcified coronary arteries versus stent material.   4. Liver contour appears somewhat nodular. There may be mild splenomegaly. Correlate for chronic liver disease.  5. Indeterminate round sclerotic focus in the LEFT posterior fourth rib.  Recommend correlation with serum PSA and consider nuclear medicine bone scan if clinically indicated.   PET Scan 3/27/24  1.  Innumerable groundglass nodules and surrounding groundglass opacity in the lungs persist and exhibit low-grade for uptake. These may be infectious or neoplastic. These are likely too small for percutaneous  biopsy.  2.  Multiple enlarged mediastinal and bilateral hilar lymph nodes are more intensely hypermetabolic. These could potentially be biopsied under EBUS guidance. These may also be infectious/reactive and short interval follow-up could be considered.  3.  Hypermetabolic lymph node at the level of the josie splenic vein confluence.  -Pt saw pulmonology and had biopsy 5/15/24.  All samples were negative for malignancy        PLAN:   Advised pt of neutropenic precautions   Continue current medications, treatment plans and follow up with PCP and any other providers  Pt will have labs only in 3 months  RTC in 6 months.  Pt has orders for labs   Care discussed with patient.  Understanding expressed.  Patient agreeable with plan.         Twila Wick, CHRISTIANA  09/26/2024

## 2024-10-04 ENCOUNTER — FLU SHOT (OUTPATIENT)
Dept: FAMILY MEDICINE CLINIC | Facility: CLINIC | Age: 64
End: 2024-10-04
Payer: COMMERCIAL

## 2024-10-04 DIAGNOSIS — Z23 NEED FOR INFLUENZA VACCINATION: Primary | ICD-10-CM

## 2024-10-04 PROCEDURE — 90656 IIV3 VACC NO PRSV 0.5 ML IM: CPT | Performed by: NURSE PRACTITIONER

## 2024-10-04 PROCEDURE — 90471 IMMUNIZATION ADMIN: CPT | Performed by: NURSE PRACTITIONER

## 2024-11-07 ENCOUNTER — OFFICE VISIT (OUTPATIENT)
Dept: FAMILY MEDICINE CLINIC | Facility: CLINIC | Age: 64
End: 2024-11-07
Payer: COMMERCIAL

## 2024-11-07 VITALS
DIASTOLIC BLOOD PRESSURE: 60 MMHG | SYSTOLIC BLOOD PRESSURE: 122 MMHG | HEART RATE: 73 BPM | HEIGHT: 72 IN | WEIGHT: 225.2 LBS | BODY MASS INDEX: 30.5 KG/M2 | RESPIRATION RATE: 18 BRPM | TEMPERATURE: 98 F | OXYGEN SATURATION: 96 %

## 2024-11-07 DIAGNOSIS — M79.674 PAIN OF TOE OF RIGHT FOOT: Primary | ICD-10-CM

## 2024-11-07 PROCEDURE — 99213 OFFICE O/P EST LOW 20 MIN: CPT | Performed by: FAMILY MEDICINE

## 2024-11-07 NOTE — PROGRESS NOTES
Subjective   Gregorio Callahan is a 64 y.o. male.     History of Present Illness  64-year-old male with right great toe subungual hematoma      The following portions of the patient's history were reviewed and updated as appropriate: allergies, current medications, past family history, past medical history, past social history, past surgical history, and problem list.    Review of Systems   Respiratory:  Negative for shortness of breath.         History of pulmonary fibrosis-environmental damage   Cardiovascular:  Negative for chest pain and leg swelling.   Skin:         Subungual hematoma right great toe-observation-also seborrheic keratosis back biopsy 2 years ago       Objective   Physical Exam  Vitals and nursing note reviewed.   Constitutional:       Appearance: He is obese.   Eyes:      Extraocular Movements: Extraocular movements intact.      Pupils: Pupils are equal, round, and reactive to light.   Cardiovascular:      Rate and Rhythm: Normal rate and regular rhythm.   Pulmonary:      Effort: Pulmonary effort is normal.      Breath sounds: Normal breath sounds.   Musculoskeletal:      Right lower leg: No edema.      Left lower leg: No edema.   Skin:     General: Skin is warm and dry.      Comments: Subungual hematoma right great toe-observation-left back-biopsied previously-consistent with seborrheic keratosis   Neurological:      General: No focal deficit present.      Mental Status: He is alert and oriented to person, place, and time.   Psychiatric:         Mood and Affect: Mood normal.         Behavior: Behavior normal.         Assessment & Plan   Diagnoses and all orders for this visit:    1. Pain of toe of right foot (Primary)         Plan observation of toe and back skin lesion-advised RSV vaccine-get COVID-vaccine in January just had COVID

## 2024-11-15 ENCOUNTER — OFFICE VISIT (OUTPATIENT)
Dept: CARDIOLOGY | Facility: CLINIC | Age: 64
End: 2024-11-15
Payer: COMMERCIAL

## 2024-11-15 VITALS
DIASTOLIC BLOOD PRESSURE: 70 MMHG | SYSTOLIC BLOOD PRESSURE: 126 MMHG | OXYGEN SATURATION: 97 % | HEIGHT: 72 IN | BODY MASS INDEX: 30.2 KG/M2 | WEIGHT: 223 LBS | HEART RATE: 66 BPM

## 2024-11-15 DIAGNOSIS — I25.10 CORONARY ARTERY DISEASE INVOLVING NATIVE CORONARY ARTERY OF NATIVE HEART WITHOUT ANGINA PECTORIS: Primary | ICD-10-CM

## 2024-11-15 DIAGNOSIS — I10 BENIGN ESSENTIAL HYPERTENSION: ICD-10-CM

## 2024-11-15 DIAGNOSIS — E78.2 MIXED HYPERLIPIDEMIA: ICD-10-CM

## 2024-11-15 PROCEDURE — 99214 OFFICE O/P EST MOD 30 MIN: CPT | Performed by: INTERNAL MEDICINE

## 2024-11-15 PROCEDURE — 93000 ELECTROCARDIOGRAM COMPLETE: CPT | Performed by: INTERNAL MEDICINE

## 2024-11-15 RX ORDER — EZETIMIBE 10 MG/1
10 TABLET ORAL DAILY
Qty: 90 TABLET | Refills: 3 | Status: SHIPPED | OUTPATIENT
Start: 2024-11-15 | End: 2025-11-10

## 2024-11-15 NOTE — PROGRESS NOTES
Reason for Visit: cardiovascular follow up.    HPI:  Gregorio Callahan is a 64 y.o. male is here today for 1 year follow-up.  He is a former patient of Dr Lynn and is transitioning care.  Limited previous records indicate history of PCI.  He reports having a heart attack and stent in .  He reports problems with his blood work.  He also has some difficulty breathing due to prior work exposures.  He feels dizzy at times.  His blood pressure has been normal when he feels dizzy.  It does not bother him much.  He denies any chest pain, palpitations, dizziness, syncope, PND, or orthopnea.  He is active at work, but does not get much formal exercise.  His diet is not particularly healthy, but is better than it use to be.      Previous Cardiac Testing and Procedures:  -LHC (2007) PCI to mid LAD  -Stress echo (2015) good functional capacity, low risk for ischemia  -Stress echo (2018) good stamina without chest pain, normal rhythm and hemodynamics, low risk for ischemia  -Stress echo (2021) EF 56 to 60%, low risk for ischemia    Lab data:  -Lipid panel (2024) total cholesterol 114, HDL 35, LDL 52, triglycerides 155  -BMP (2024) creatinine 1.05, potassium 4.3, sodium 144    Patient Active Problem List   Diagnosis    Mixed hyperlipidemia    Benign essential hypertension    Coronary artery disease involving native coronary artery of native heart without angina pectoris    H/O acute myocardial infarction    History of PTCA    SZYMANSKI (dyspnea on exertion)    Encounter for screening for malignant neoplasm of colon    Multiple lung nodules    Mediastinal adenopathy       Social History     Tobacco Use    Smoking status: Former     Current packs/day: 0.00     Average packs/day: 3.0 packs/day for 30.0 years (90.0 ttl pk-yrs)     Types: Cigarettes     Start date: 1977     Quit date: 2007     Years since quittin.8     Passive exposure: Past    Smokeless tobacco: Never    Tobacco comments:     age  quit smokin   Vaping Use    Vaping status: Never Used   Substance Use Topics    Alcohol use: Yes     Comment: Occasional alcohol use. Drinks beer.    Drug use: No       Family History   Problem Relation Age of Onset    Heart disease Mother     Alzheimer's disease Father     Heart attack Sister     Heart attack Brother     No Known Problems Maternal Grandmother     No Known Problems Maternal Grandfather     No Known Problems Paternal Grandmother     No Known Problems Paternal Grandfather     Colon cancer Neg Hx     Colon polyps Neg Hx        The following portions of the patient's history were reviewed and updated as appropriate: allergies, current medications, past family history, past medical history, past social history, past surgical history, and problem list.      Current Outpatient Medications:     Albuterol-Budesonide (Airsupra) 90-80 MCG/ACT aerosol, Inhale 2 puffs 6 (Six) Times a Day., Disp: 10.7 g, Rfl: 3    aspirin 81 MG EC tablet, Take 1 tablet by mouth Daily., Disp: , Rfl:     atorvastatin (LIPITOR) 80 MG tablet, Take 1 tablet by mouth Daily., Disp: 90 tablet, Rfl: 3    citalopram (CeleXA) 40 MG tablet, TAKE 1 TABLET BY MOUTH EVERY DAY, Disp: 90 tablet, Rfl: 3    ezetimibe (ZETIA) 10 MG tablet, Take 1 tablet by mouth Daily for 360 days., Disp: 90 tablet, Rfl: 3    lisinopril (PRINIVIL,ZESTRIL) 5 MG tablet, Take 1 tablet by mouth Daily., Disp: 90 tablet, Rfl: 3    nitroglycerin (NITROSTAT) 0.4 MG SL tablet, TAKE 1 TABLET BY MOUTH EVERY 5 MINUTES UP TO 3 TIMES AS NEEDED FOR CHEST PAIN., Disp: 25 tablet, Rfl: 2    traZODone (DESYREL) 50 MG tablet, TAKE 1 OR 2 TABLETS BY MOUTH AT BEDTIME AS NEEDED, Disp: 180 tablet, Rfl: 3    traZODone (DESYREL) 50 MG tablet, Take 1-2 tablets nightly for sleep., Disp: 180 tablet, Rfl: 2    vitamin B-12 (CYANOCOBALAMIN) 1000 MCG tablet, Take 1 tablet by mouth Daily., Disp: , Rfl:     Review of Systems   Constitutional: Negative for chills and fever.   Cardiovascular:   "Negative for chest pain and paroxysmal nocturnal dyspnea.   Respiratory:  Positive for shortness of breath. Negative for cough.    Skin:  Negative for rash.   Gastrointestinal:  Negative for abdominal pain and heartburn.   Neurological:  Positive for dizziness. Negative for numbness.       Objective   /70 (BP Location: Left arm, Patient Position: Sitting, Cuff Size: Adult)   Pulse 66   Ht 182.9 cm (72.01\")   Wt 101 kg (223 lb)   SpO2 97%   BMI 30.24 kg/m²   Constitutional:       Appearance: Well-developed.   HENT:      Head: Normocephalic and atraumatic.   Pulmonary:      Effort: Pulmonary effort is normal.      Breath sounds: Normal breath sounds.   Cardiovascular:      Normal rate. Regular rhythm.   Edema:     Peripheral edema absent.   Skin:     General: Skin is warm and dry.   Neurological:      Mental Status: Alert and oriented to person, place, and time.         ECG 12 Lead    Date/Time: 11/15/2024 8:57 AM  Performed by: Wilner Chew MD    Authorized by: Wilner Chew MD  Comparison: compared with previous ECG from 11/15/2023  Similar to previous ECG  Rhythm: sinus rhythm  Rate: normal    Clinical impression: normal ECG            ICD-10-CM ICD-9-CM   1. Coronary artery disease involving native coronary artery of native heart without angina pectoris  I25.10 414.01   2. Mixed hyperlipidemia  E78.2 272.2   3. Benign essential hypertension  I10 401.1         Assessment/Plan:  1.  Coronary artery disease: Reported history of PCI to mid LAD 6/2007.  No recent anginal symptoms.  Continue aspirin, atorvastatin, and nitroglycerin.    2.  Hyperlipidemia: Lipid panel on 8/6/2024 showed normal LDL with low HDL and mildly elevated triglycerides.  Continue atorvastatin and ezetimibe.     3.  Essential hypertension: Blood pressure is well controlled today.  Continue lisinopril.    "

## 2024-12-16 ENCOUNTER — OFFICE VISIT (OUTPATIENT)
Dept: FAMILY MEDICINE CLINIC | Facility: CLINIC | Age: 64
End: 2024-12-16
Payer: COMMERCIAL

## 2024-12-16 VITALS
DIASTOLIC BLOOD PRESSURE: 82 MMHG | SYSTOLIC BLOOD PRESSURE: 122 MMHG | TEMPERATURE: 98.9 F | RESPIRATION RATE: 22 BRPM | WEIGHT: 223 LBS | OXYGEN SATURATION: 97 % | HEIGHT: 72 IN | HEART RATE: 87 BPM | BODY MASS INDEX: 30.2 KG/M2

## 2024-12-16 DIAGNOSIS — R05.1 ACUTE COUGH: ICD-10-CM

## 2024-12-16 DIAGNOSIS — J06.9 VIRAL URI WITH COUGH: Primary | ICD-10-CM

## 2024-12-16 LAB
EXPIRATION DATE: NORMAL
FLUAV AG UPPER RESP QL IA.RAPID: NOT DETECTED
FLUBV AG UPPER RESP QL IA.RAPID: NOT DETECTED
INTERNAL CONTROL: NORMAL
Lab: NORMAL
SARS-COV-2 AG UPPER RESP QL IA.RAPID: NOT DETECTED

## 2024-12-16 PROCEDURE — 99213 OFFICE O/P EST LOW 20 MIN: CPT | Performed by: NURSE PRACTITIONER

## 2024-12-16 PROCEDURE — 87428 SARSCOV & INF VIR A&B AG IA: CPT | Performed by: NURSE PRACTITIONER

## 2024-12-16 NOTE — PROGRESS NOTES
CC:   Chief Complaint   Patient presents with    Cough     X4 days    sinus drainage        History:  Gregorio Callahan is a 64 y.o. male who presents today for evaluation of the above problems.      HPI  Patient presents for illness. Symptoms started 4 days ago with cough and sinus drainage.  Reports overall congestion.  States symptoms have not worsened and starting to level off.  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  Afebrile.  Denies any wheezing or difficulty breathing.  Taking over-the-counter DayQuil and NyQuil.    No Known Allergies  Past Medical History:   Diagnosis Date    Benign essential hypertension     CAD in native artery     Elevated cholesterol     Finger fracture     H/O acute myocardial infarction     History of PTCA     [place] (hard of hearing)     BILATERAL HEARING AIDS    Hyperlipidemia, mild     Nasal fracture     Rib fracture     Sleep apnea     Tobacco use disorder     2007 QUIT SMOKING     Past Surgical History:   Procedure Laterality Date    BRONCHOSCOPY N/A 5/13/2024    Procedure: BRONCHOSCOPY WITH ENDOBRONCHIAL ULTRASOUND;  Surgeon: Milind Fowler MD;  Location: [facility] OR;  Service: Pulmonary;  Laterality: N/A;    BRONCHOSCOPY WITH ION ROBOTIC ASSIST N/A 5/13/2024    Procedure: BRONCHOSCOPY WITH ION ROBOT and BRONCHOSCOPY WITH ENDOBRONCHIAL ULTRASOUND;  Surgeon: Milind Fowler MD;  Location: [facility] OR;  Service: Robotics - Pulmonary;  Laterality: N/A;  pre multiple lung nodules  post multiple lung nodules  dr rafa chopra    CARDIAC CATHETERIZATION      COLONOSCOPY      COLONOSCOPY N/A 03/21/2022    Procedure: COLONOSCOPY WITH ANESTHESIA;  Surgeon: Stanislav Clarke MD;  Location: [facility] ENDOSCOPY;  Service: Gastroenterology;  Laterality: N/A;  pre screen  post normal  Ajith Chopra MD    CORONARY STENT PLACEMENT  2007    x1     Family History   Problem Relation Age of Onset    Heart disease Mother      "Alzheimer's disease Father     Heart attack Sister     Heart attack Brother     No Known Problems Maternal Grandmother     No Known Problems Maternal Grandfather     No Known Problems Paternal Grandmother     No Known Problems Paternal Grandfather     Colon cancer Neg Hx     Colon polyps Neg Hx       reports that he quit smoking about 17 years ago. His smoking use included cigarettes. He started smoking about 47 years ago. He has a 90 pack-year smoking history. He has been exposed to tobacco smoke. He has never used smokeless tobacco. He reports current alcohol use. He reports that he does not use drugs.      Current Outpatient Medications:     Albuterol-Budesonide (Airsupra) 90-80 MCG/ACT aerosol, Inhale 2 puffs 6 (Six) Times a Day., Disp: 10.7 g, Rfl: 3    aspirin 81 MG EC tablet, Take 1 tablet by mouth Daily., Disp: , Rfl:     atorvastatin (LIPITOR) 80 MG tablet, Take 1 tablet by mouth Daily., Disp: 90 tablet, Rfl: 3    citalopram (CeleXA) 40 MG tablet, TAKE 1 TABLET BY MOUTH EVERY DAY, Disp: 90 tablet, Rfl: 3    ezetimibe (ZETIA) 10 MG tablet, Take 1 tablet by mouth Daily for 360 days., Disp: 90 tablet, Rfl: 3    lisinopril (PRINIVIL,ZESTRIL) 5 MG tablet, Take 1 tablet by mouth Daily., Disp: 90 tablet, Rfl: 3    nitroglycerin (NITROSTAT) 0.4 MG SL tablet, TAKE 1 TABLET BY MOUTH EVERY 5 MINUTES UP TO 3 TIMES AS NEEDED FOR CHEST PAIN., Disp: 25 tablet, Rfl: 2    traZODone (DESYREL) 50 MG tablet, TAKE 1 OR 2 TABLETS BY MOUTH AT BEDTIME AS NEEDED, Disp: 180 tablet, Rfl: 3    vitamin B-12 (CYANOCOBALAMIN) 1000 MCG tablet, Take 1 tablet by mouth Daily., Disp: , Rfl:     OBJECTIVE:  /82 (BP Location: Left arm, Patient Position: Sitting, Cuff Size: Adult)   Pulse 87   Temp 98.9 °F (37.2 °C) (Oral)   Resp 22   Ht 182.9 cm (72\")   Wt 101 kg (223 lb)   SpO2 97%   BMI 30.24 kg/m²    Estimated body mass index is 30.24 kg/m² as calculated from the following:    Height as of this encounter: 182.9 cm (72\").    " Weight as of this encounter: 101 kg (223 lb).                  Physical Exam  Constitutional:       General: He is not in acute distress.     Appearance: Normal appearance.   HENT:      Head: Normocephalic and atraumatic.      Right Ear: Tympanic membrane and external ear normal.      Left Ear: Tympanic membrane and external ear normal.      Nose:      Right Sinus: No maxillary sinus tenderness or frontal sinus tenderness.      Left Sinus: No maxillary sinus tenderness or frontal sinus tenderness.      Mouth/Throat:      Mouth: Mucous membranes are moist.      Pharynx: Oropharynx is clear.   Cardiovascular:      Rate and Rhythm: Normal rate and regular rhythm.      Heart sounds: Normal heart sounds.   Pulmonary:      Effort: No respiratory distress.      Breath sounds: Normal breath sounds. No wheezing.   Lymphadenopathy:      Cervical: No cervical adenopathy.   Skin:     General: Skin is warm and dry.   Neurological:      Mental Status: He is alert and oriented to person, place, and time.      Gait: Gait normal.   Psychiatric:         Mood and Affect: Mood normal.         Behavior: Behavior normal.              Assessment/Plan    Diagnoses and all orders for this visit:    1. Viral URI with cough (Primary)    Flu and COVID test negative.  Discussed over-the-counter medications like Coricidin HBP to help with congestion and cough.  Encourage patient to treat symptoms, increase fluids and rest-if no better in 2 to 3 days-patient to call and we will send an antibiotic.    2. Acute cough  -     POCT SARS-CoV-2 Antigen SKY + Flu                An After Visit Summary was printed and given to the patient at discharge.  Return if symptoms worsen or fail to improve.

## 2024-12-18 ENCOUNTER — TELEPHONE (OUTPATIENT)
Dept: FAMILY MEDICINE CLINIC | Facility: CLINIC | Age: 64
End: 2024-12-18
Payer: COMMERCIAL

## 2024-12-18 RX ORDER — AZITHROMYCIN 250 MG/1
TABLET, FILM COATED ORAL
Qty: 6 TABLET | Refills: 0 | Status: SHIPPED | OUTPATIENT
Start: 2024-12-18

## 2024-12-18 NOTE — TELEPHONE ENCOUNTER
Pt calling and not any better--was advised to call back if not any better.  Still stopped up, runny nose, rt side facial pain. Clear/pinkish mucous.    [facility]

## 2024-12-19 ENCOUNTER — TELEPHONE (OUTPATIENT)
Dept: ONCOLOGY | Facility: CLINIC | Age: 64
End: 2024-12-19
Payer: COMMERCIAL

## 2024-12-19 NOTE — TELEPHONE ENCOUNTER
Caller: Gregorio Callahan    Relationship: Self    Best call back number: [phone]     What is the best time to reach you: ANYTIME    Who are you requesting to speak with (clinical staff, provider,  specific staff member): CLINICAL     What was the call regarding: PT IS DUE TO HAVE HIS LABS DONE ON MONDAY, PT HAS BEEN SICK AND IS ON AZITHROMYCIN AND OTC MEDS LIKE NIQUIL. WILL THIS AFFECT THE PT'S LABS AND SHOULD HE STILL DO THEM ON MONDAY?     Is it okay if the provider responds through MyChart: PT WOULD PREFER A CALL BACK.

## 2025-01-03 ENCOUNTER — TELEPHONE (OUTPATIENT)
Dept: ONCOLOGY | Facility: CLINIC | Age: 65
End: 2025-01-03
Payer: COMMERCIAL

## 2025-01-03 NOTE — TELEPHONE ENCOUNTER
----- Message from Twila Wick sent at 12/31/2024  9:27 AM CST -----  Pt was seen at [facility] and they said to refer him back when cytopenias are at the point of needing intervention. Can you call Dr. Goldstein' office at [facility] and ask them if he could provide us with parameters.  His ANC is 0.9 here but I want to be sure   what  the  point  of consideration of therapy is.

## 2025-01-08 ENCOUNTER — TELEPHONE (OUTPATIENT)
Dept: FAMILY MEDICINE CLINIC | Facility: CLINIC | Age: 65
End: 2025-01-08
Payer: COMMERCIAL

## 2025-01-08 DIAGNOSIS — Z29.11 NEED FOR RSV VACCINATION: Primary | ICD-10-CM

## 2025-01-08 NOTE — TELEPHONE ENCOUNTER
[facility] called stating [facility] told him he would need a prescription for the RSV shot due to patient being under 70.  Please advise.     Statement Selected

## 2025-01-08 NOTE — TELEPHONE ENCOUNTER
Send in a prescription for patient to get RSV vaccine at [facility].  Diagnosis chronic lung disease

## 2025-01-17 ENCOUNTER — TELEPHONE (OUTPATIENT)
Dept: ONCOLOGY | Facility: CLINIC | Age: 65
End: 2025-01-17
Payer: COMMERCIAL

## 2025-01-17 NOTE — TELEPHONE ENCOUNTER
Received a new contact number for dr. Goldstein [phone]. Left msg with nurse line to return call for pt information request.

## 2025-01-17 NOTE — TELEPHONE ENCOUNTER
----- Message from Kath ROGERS sent at 1/7/2025  3:50 PM CST -----  Pt was seen at [facility] and they said to refer him back when cytopenias are at the point of needing intervention. Can you call Dr. Goldstein' office at [facility] and ask them if he could provide us with parameters.  His ANC is 0.9 here but I want to be sure what  the  point  of consideration of therapy is.

## 2025-01-23 ENCOUNTER — TELEPHONE (OUTPATIENT)
Dept: ONCOLOGY | Facility: CLINIC | Age: 65
End: 2025-01-23
Payer: COMMERCIAL

## 2025-01-23 NOTE — TELEPHONE ENCOUNTER
Spoke to Samantha at Dr Goldstein office. They will see pt Feb. 4th at 10:30 and mason let us know if any tx s required.     Samantha -  Phone [phone]   Fax [phone]

## 2025-01-27 RX ORDER — CITALOPRAM HYDROBROMIDE 40 MG/1
TABLET ORAL
Qty: 90 TABLET | Refills: 3 | Status: SHIPPED | OUTPATIENT
Start: 2025-01-27

## 2025-02-03 RX ORDER — TRAZODONE HYDROCHLORIDE 50 MG/1
TABLET, FILM COATED ORAL
Qty: 180 TABLET | Refills: 3 | Status: CANCELLED | OUTPATIENT
Start: 2025-02-03

## 2025-02-03 RX ORDER — TRAZODONE HYDROCHLORIDE 50 MG/1
50 TABLET, FILM COATED ORAL NIGHTLY
Qty: 180 TABLET | Refills: 3 | Status: SHIPPED | OUTPATIENT
Start: 2025-02-03

## 2025-02-03 RX ORDER — TRAZODONE HYDROCHLORIDE 50 MG/1
TABLET, FILM COATED ORAL
Qty: 180 TABLET | Refills: 2 | OUTPATIENT
Start: 2025-02-03

## 2025-02-03 NOTE — TELEPHONE ENCOUNTER
Pt calling about this medication refill and why it was denied--informed request was sent to wrong provider.    Rx Refill Note  Requested Prescriptions     Pending Prescriptions Disp Refills    traZODone (DESYREL) 50 MG tablet 180 tablet 3     Sig: Take 1-2 tablets nightly at bedtime.     Refused Prescriptions Disp Refills    traZODone (DESYREL) 50 MG tablet [Pharmacy Med Name: TRAZODONE 50 MG TABLET] 180 tablet 2     Sig: TAKE 1-2 TABLETS NIGHTLY FOR SLEEP.     Refused By: DAVON DEMARCO     Reason for Refusal: Patient unknown to prescriber      Last office visit with prescribing clinician: Visit date not found   Last telemedicine visit with prescribing clinician: Visit date not found   Next office visit with prescribing clinician: Visit date not found                         Would you like a call back once the refill request has been completed: [] Yes [] No    If the office needs to give you a call back, can they leave a voicemail: [] Yes [] No    Espinoza Burch Rep  02/03/25, 15:52 CST

## 2025-02-04 ENCOUNTER — OFFICE VISIT (OUTPATIENT)
Dept: FAMILY MEDICINE CLINIC | Facility: CLINIC | Age: 65
End: 2025-02-04
Payer: COMMERCIAL

## 2025-02-04 VITALS
WEIGHT: 223 LBS | OXYGEN SATURATION: 95 % | HEART RATE: 86 BPM | TEMPERATURE: 98.7 F | BODY MASS INDEX: 30.2 KG/M2 | HEIGHT: 72 IN | SYSTOLIC BLOOD PRESSURE: 117 MMHG | DIASTOLIC BLOOD PRESSURE: 70 MMHG

## 2025-02-04 DIAGNOSIS — Z12.5 SPECIAL SCREENING FOR MALIGNANT NEOPLASM OF PROSTATE: ICD-10-CM

## 2025-02-04 DIAGNOSIS — Z00.00 ROUTINE GENERAL MEDICAL EXAMINATION AT A HEALTH CARE FACILITY: Primary | ICD-10-CM

## 2025-02-04 DIAGNOSIS — I10 BENIGN ESSENTIAL HYPERTENSION: ICD-10-CM

## 2025-02-04 LAB
BILIRUB BLD-MCNC: NEGATIVE MG/DL
CLARITY, POC: CLEAR
COLOR UR: YELLOW
GLUCOSE UR STRIP-MCNC: NEGATIVE MG/DL
KETONES UR QL: NEGATIVE
LEUKOCYTE EST, POC: NEGATIVE
NITRITE UR-MCNC: NEGATIVE MG/ML
PH UR: 5.5 [PH] (ref 5–8)
PROT UR STRIP-MCNC: NEGATIVE MG/DL
RBC # UR STRIP: NEGATIVE /UL
SP GR UR: 1.03 (ref 1–1.03)
UROBILINOGEN UR QL: NORMAL

## 2025-02-04 PROCEDURE — 99396 PREV VISIT EST AGE 40-64: CPT | Performed by: FAMILY MEDICINE

## 2025-02-04 PROCEDURE — 81003 URINALYSIS AUTO W/O SCOPE: CPT | Performed by: FAMILY MEDICINE

## 2025-02-04 NOTE — PROGRESS NOTES
Chief Complaint   Patient presents with    Annual Exam       History:  Gregorio Callahan is a 64 y.o. male who presents today for evaluation of the above problems.      64-year-old male for annual physical        ROS:  Review of Systems   Respiratory:  Positive for apnea and shortness of breath.         CT follow-up with pulmonologist   Cardiovascular:  Negative for chest pain and leg swelling.   Gastrointestinal:         Colonoscopy up-to-date   Musculoskeletal:  Positive for arthralgias.   Hematological:         Possible myelodysplasia   All other systems reviewed and are negative.      No Known Allergies  Past Medical History:   Diagnosis Date    Benign essential hypertension     CAD in native artery     Elevated cholesterol     Finger fracture     H/O acute myocardial infarction     History of PTCA     [place] (hard of hearing)     BILATERAL HEARING AIDS    Hyperlipidemia, mild     Nasal fracture     Rib fracture     Sleep apnea     Tobacco use disorder     2007 QUIT SMOKING     Past Surgical History:   Procedure Laterality Date    BRONCHOSCOPY N/A 5/13/2024    Procedure: BRONCHOSCOPY WITH ENDOBRONCHIAL ULTRASOUND;  Surgeon: Milind Fowler MD;  Location: [facility] OR;  Service: Pulmonary;  Laterality: N/A;    BRONCHOSCOPY WITH ION ROBOTIC ASSIST N/A 5/13/2024    Procedure: BRONCHOSCOPY WITH ION ROBOT and BRONCHOSCOPY WITH ENDOBRONCHIAL ULTRASOUND;  Surgeon: Milind Fowler MD;  Location: [facility] OR;  Service: Robotics - Pulmonary;  Laterality: N/A;  pre multiple lung nodules  post multiple lung nodules  dr rafa chopra    CARDIAC CATHETERIZATION      COLONOSCOPY      COLONOSCOPY N/A 03/21/2022    Procedure: COLONOSCOPY WITH ANESTHESIA;  Surgeon: Stanislav Clarke MD;  Location: [facility] ENDOSCOPY;  Service: Gastroenterology;  Laterality: N/A;  pre screen  post normal  Ajith Chopra MD    CORONARY STENT PLACEMENT  2007    x1     Family History   Problem Relation Age of Onset    Heart disease Mother      "Alzheimer's disease Father     Heart attack Sister     Heart attack Brother     No Known Problems Maternal Grandmother     No Known Problems Maternal Grandfather     No Known Problems Paternal Grandmother     No Known Problems Paternal Grandfather     Colon cancer Neg Hx     Colon polyps Neg Hx       reports that he quit smoking about 18 years ago. His smoking use included cigarettes. He started smoking about 48 years ago. He has a 90 pack-year smoking history. He has been exposed to tobacco smoke. He has never used smokeless tobacco. He reports current alcohol use. He reports that he does not use drugs.      Current Outpatient Medications:     Albuterol-Budesonide (Airsupra) 90-80 MCG/ACT aerosol, Inhale 2 puffs 6 (Six) Times a Day., Disp: 10.7 g, Rfl: 3    aspirin 81 MG EC tablet, Take 1 tablet by mouth Daily., Disp: , Rfl:     atorvastatin (LIPITOR) 80 MG tablet, Take 1 tablet by mouth Daily., Disp: 90 tablet, Rfl: 3    azithromycin (Zithromax Z-Migel) 250 MG tablet, Take 2 tablets the first day, then 1 tablet daily for 4 days., Disp: 6 tablet, Rfl: 0    citalopram (CeleXA) 40 MG tablet, TAKE 1 TABLET BY MOUTH EVERY DAY, Disp: 90 tablet, Rfl: 3    ezetimibe (ZETIA) 10 MG tablet, Take 1 tablet by mouth Daily for 360 days., Disp: 90 tablet, Rfl: 3    lisinopril (PRINIVIL,ZESTRIL) 5 MG tablet, Take 1 tablet by mouth Daily., Disp: 90 tablet, Rfl: 3    nitroglycerin (NITROSTAT) 0.4 MG SL tablet, TAKE 1 TABLET BY MOUTH EVERY 5 MINUTES UP TO 3 TIMES AS NEEDED FOR CHEST PAIN., Disp: 25 tablet, Rfl: 2    traZODone (DESYREL) 50 MG tablet, Take 1 tablet by mouth Every Night., Disp: 180 tablet, Rfl: 3    vitamin B-12 (CYANOCOBALAMIN) 1000 MCG tablet, Take 1 tablet by mouth Daily., Disp: , Rfl:     OBJECTIVE:  /70 (BP Location: Right arm, Patient Position: Sitting, Cuff Size: Large Adult)   Pulse 86   Temp 98.7 °F (37.1 °C) (Infrared)   Ht 182.9 cm (72\")   Wt 101 kg (223 lb)   SpO2 95%   BMI 30.24 kg/m²    Physical " Exam  Vitals and nursing note reviewed.   Constitutional:       Appearance: Normal appearance.   HENT:      Right Ear: Tympanic membrane and ear canal normal.      Left Ear: Tympanic membrane and ear canal normal.      Mouth/Throat:      Mouth: Mucous membranes are moist.   Eyes:      Extraocular Movements: Extraocular movements intact.      Pupils: Pupils are equal, round, and reactive to light.   Neck:      Vascular: No carotid bruit.   Cardiovascular:      Rate and Rhythm: Normal rate and regular rhythm.      Pulses: Normal pulses.      Heart sounds: Normal heart sounds.   Pulmonary:      Effort: Pulmonary effort is normal.      Breath sounds: Normal breath sounds.   Abdominal:      General: Abdomen is flat.      Palpations: Abdomen is soft. There is no mass.   Musculoskeletal:      Right lower leg: No edema.      Left lower leg: No edema.   Lymphadenopathy:      Cervical: No cervical adenopathy.   Skin:     General: Skin is warm and dry.   Neurological:      General: No focal deficit present.      Mental Status: He is alert and oriented to person, place, and time.   Psychiatric:         Mood and Affect: Mood normal.         Behavior: Behavior normal.         Thought Content: Thought content normal.         Judgment: Judgment normal.         BMI is >= 30 and <35. (Class 1 Obesity). The following options were offered after discussion;: exercise counseling/recommendations and nutrition counseling/recommendations       Health Maintenance:  Immunization History   Administered Date(s) Administered    COVID-19 (MODERNA) 1st,2nd,3rd Dose Monovalent 03/16/2021, 04/13/2021, 11/11/2021    COVID-19 (PFIZER) 12YRS+ (COMIRNATY) 10/19/2023    COVID-19 (PFIZER) BIVALENT 12+YRS 10/25/2022    Flu Vaccine Intradermal Quad 18-64YR 10/18/2017    Flu Vaccine Quad PF >36MO 11/12/2016    Fluad Quad 65+ 08/28/2020    Fluzone  >6mos 10/04/2024    Fluzone (or Fluarix & Flulaval for VFC) >6mos 10/25/2021, 10/25/2022, 10/11/2023     Pneumococcal Conjugate 13-Valent (PCV13) 08/10/2016    Pneumococcal Polysaccharide (PPSV23) 10/23/2018    RSV, unspecified (Vaccine or MAB) 01/08/2025    Shingrix 11/18/2019, 07/16/2020    Tdap 10/18/2017    flucelvax quad pfs =>4 YRS 10/23/2018, 11/12/2019        Up-to-date after today    Assessment/Plan    Diagnoses and all orders for this visit:    1. Routine general medical examination at a health care facility (Primary)  -     TSH  -     T4, free  -     CBC & Differential  -     Comprehensive Metabolic Panel  -     Lipid Panel    2. Special screening for malignant neoplasm of prostate  -     PSA Screen    3. Benign essential hypertension  -     POC Urinalysis Dipstick, Multipro      Plan above-continue seeing specialist-immunizations up-to-date    An After Visit Summary was printed and given to the patient at discharge.  Return in 6 months (on 8/4/2025), or if symptoms worsen or fail to improve.         Ajith Barrientos MD 2/4/2025   Electronically signed.

## 2025-02-04 NOTE — PATIENT INSTRUCTIONS
What is the Mediterranean Diet?  The Mediterranean Diet is a way of eating that emphasizes plant-based foods and healthy fats. You focus on overall eating patterns rather than following strict formulas or calculations.    In general, you’ll eat:    Lots of vegetables, fruit, beans, lentils and nuts.  A good amount of whole grains, like whole-wheat bread and brown rice.  Plenty of extra virgin olive oil (EVOO) as a source of healthy fat.  A good amount of fish, especially fish rich in omega-3 fatty acids.  A moderate amount of natural cheese and yogurt.  Little or no red meat, choosing poultry, fish or beans instead of red meat.  Little or no sweets, sugary drinks or butter.  A moderate amount of wine with meals (but if you don’t already drink, don’t start).  This is how people ate in certain Mediterranean countries in the mid-20th century. Researchers have linked these eating patterns with a reduced risk of coronary artery disease (CAD). Today, healthcare providers recommend this eating plan if you have risk factors for heart disease or to support other aspects of your health.    A dietitian can help you modify your approach as needed based on your medical history, underlying conditions, allergies and preferences.        What are the benefits of the Mediterranean Diet?  The mediterranean diet allows you to focus on overall eating patterns rather than following strict formulas or calculations.  The Mediterranean Diet has many benefits, including:    Lowering your risk of cardiovascular disease, including a heart attack or stroke.  Supporting a body weight that’s healthy for you.  Supporting healthy blood sugar levels, blood pressure and cholesterol.  Lowering your risk of metabolic syndrome.  Supporting a healthy balance of gut microbiota (bacteria and other microorganisms) in your digestive system.  Lowering your risk for certain types of cancer.  Slowing the decline of brain function as you age.  Helping you live  longer.        The Mediterranean Diet has these benefits because it:    Limits saturated fat and trans fat. You need some saturated fat, but only in small amounts. Eating too much saturated fat can raise your LDL (bad) cholesterol. A high LDL raises your risk of plaque buildup in your arteries (atherosclerosis). Trans fat has no health benefits. Both of these “unhealthy fats” can cause inflammation.  Encourages healthy unsaturated fats, including omega-3 fatty acids. Unsaturated fats promote healthy cholesterol levels, support brain health and combat inflammation. Plus, a diet high in unsaturated fats and low in saturated fat promotes healthy blood sugar levels.  Limits sodium. Eating foods high in sodium can raise your blood pressure, putting you at a greater risk for a heart attack or stroke.  Limits refined carbohydrates, including sugar. Foods high in refined carbs can cause your blood sugar to spike. Refined carbs also give you excess calories without much nutritional benefit. For example, such foods often have little or no fiber.  Favors foods high in fiber and antioxidants. These nutrients help reduce inflammation throughout your body. Fiber also helps keep waste moving through your large intestine and helps maintain healthy blood sugar levels. Antioxidants protect you against cancer by warding off free radicals.  The Mediterranean Diet includes many different nutrients that work together to help your body. There’s no single food or ingredient responsible for the Mediterranean Diet’s benefits. Instead, the diet is healthy for you because of the combination of nutrients it provides.    Think of a choir with many people singing. One voice alone might carry part of the tune, but you need all the voices to come together to achieve the full effect. Similarly, the Mediterranean Diet works by giving you an ideal blend of nutrients that harmonize to support your health.    Mediterranean Diet food list  The  Mediterranean Diet encourages you to eat plenty of some foods (like whole grains and vegetables) while limiting others. If you’re planning a grocery store trip, you might wonder which foods to buy. Here are some examples of foods to eat often with the Mediterranean Diet.

## 2025-02-05 DIAGNOSIS — E78.00 PURE HYPERCHOLESTEROLEMIA: ICD-10-CM

## 2025-02-05 RX ORDER — ATORVASTATIN CALCIUM 80 MG/1
80 TABLET, FILM COATED ORAL DAILY
Qty: 90 TABLET | Refills: 3 | Status: SHIPPED | OUTPATIENT
Start: 2025-02-05

## 2025-02-05 NOTE — TELEPHONE ENCOUNTER
Rx Refill Note  Requested Prescriptions     Pending Prescriptions Disp Refills    atorvastatin (LIPITOR) 80 MG tablet 90 tablet 3     Sig: Take 1 tablet by mouth Daily.      Last office visit with prescribing clinician: 2/4/2025   Last telemedicine visit with prescribing clinician: Visit date not found   Next office visit with prescribing clinician: 8/5/2025                         Would you like a call back once the refill request has been completed: [] Yes [] No    If the office needs to give you a call back, can they leave a voicemail: [] Yes [] No    Espinoza Burch Rep  02/05/25, 13:36 CST

## 2025-02-06 DIAGNOSIS — E03.9 ATHYROIDISM (ACQUIRED): Primary | ICD-10-CM

## 2025-02-06 LAB
ALBUMIN SERPL-MCNC: 4.7 G/DL (ref 3.5–5.2)
ALBUMIN/GLOB SERPL: 1.8 G/DL
ALP SERPL-CCNC: 98 U/L (ref 39–117)
ALT SERPL-CCNC: 24 U/L (ref 1–41)
AST SERPL-CCNC: 32 U/L (ref 1–40)
BILIRUB SERPL-MCNC: 0.6 MG/DL (ref 0–1.2)
BUN SERPL-MCNC: 17 MG/DL (ref 8–23)
BUN/CREAT SERPL: 14.8 (ref 7–25)
CALCIUM SERPL-MCNC: 9.9 MG/DL (ref 8.6–10.5)
CHLORIDE SERPL-SCNC: 100 MMOL/L (ref 98–107)
CHOLEST SERPL-MCNC: 118 MG/DL (ref 0–200)
CO2 SERPL-SCNC: 28.3 MMOL/L (ref 22–29)
CREAT SERPL-MCNC: 1.15 MG/DL (ref 0.76–1.27)
DIFFERENTIAL COMMENT: ABNORMAL
EGFRCR SERPLBLD CKD-EPI 2021: 71.5 ML/MIN/1.73
ERYTHROCYTE [DISTWIDTH] IN BLOOD BY AUTOMATED COUNT: 13.9 % (ref 12.3–15.4)
GLOBULIN SER CALC-MCNC: 2.6 GM/DL
GLUCOSE SERPL-MCNC: 91 MG/DL (ref 65–99)
HCT VFR BLD AUTO: 38.1 % (ref 37.5–51)
HDLC SERPL-MCNC: 34 MG/DL (ref 40–60)
HGB BLD-MCNC: 12.3 G/DL (ref 13–17.7)
LDLC SERPL CALC-MCNC: 53 MG/DL (ref 0–100)
LYMPHOCYTES # BLD MANUAL: 0.88 10*3/MM3 (ref 0.7–3.1)
LYMPHOCYTES NFR BLD MANUAL: 21.4 % (ref 19.6–45.3)
MCH RBC QN AUTO: 30.7 PG (ref 26.6–33)
MCHC RBC AUTO-ENTMCNC: 32.3 G/DL (ref 31.5–35.7)
MCV RBC AUTO: 95 FL (ref 79–97)
MONOCYTES # BLD MANUAL: 0.35 10*3/MM3 (ref 0.1–0.9)
MONOCYTES NFR BLD MANUAL: 13.3 % (ref 5–12)
NEUTROPHILS # BLD MANUAL: 1.4 10*3/MM3 (ref 1.7–7)
NEUTROPHILS NFR BLD MANUAL: 53.1 % (ref 42.7–76)
NRBC BLD AUTO-RTO: 0 /100 WBC (ref 0–0.2)
PLATELET # BLD AUTO: 107 10*3/MM3 (ref 140–450)
PLATELET BLD QL SMEAR: ABNORMAL
POTASSIUM SERPL-SCNC: 3.8 MMOL/L (ref 3.5–5.2)
PROT SERPL-MCNC: 7.3 G/DL (ref 6–8.5)
PSA SERPL-MCNC: 0.51 NG/ML (ref 0–4)
RBC # BLD AUTO: 4.01 10*6/MM3 (ref 4.14–5.8)
RBC MORPH BLD: ABNORMAL
SODIUM SERPL-SCNC: 139 MMOL/L (ref 136–145)
T4 FREE SERPL-MCNC: 0.82 NG/DL (ref 0.92–1.68)
TRIGL SERPL-MCNC: 184 MG/DL (ref 0–150)
TSH SERPL DL<=0.005 MIU/L-ACNC: 1.96 UIU/ML (ref 0.27–4.2)
VLDLC SERPL CALC-MCNC: 31 MG/DL (ref 5–40)
WBC # BLD AUTO: 2.63 10*3/MM3 (ref 3.4–10.8)

## 2025-02-06 RX ORDER — LEVOTHYROXINE SODIUM 50 UG/1
50 TABLET ORAL
Qty: 90 TABLET | Refills: 1 | Status: SHIPPED | OUTPATIENT
Start: 2025-02-06

## 2025-02-07 LAB
THYROGLOB AB SERPL-ACNC: <1 IU/ML (ref 0–0.9)
THYROPEROXIDASE AB SERPL-ACNC: 9 IU/ML (ref 0–34)
WRITTEN AUTHORIZATION: NORMAL

## 2025-02-28 ENCOUNTER — HOSPITAL ENCOUNTER (OUTPATIENT)
Dept: CT IMAGING | Facility: HOSPITAL | Age: 65
Discharge: HOME | End: 2025-02-28
Admitting: NURSE PRACTITIONER
Payer: COMMERCIAL

## 2025-02-28 DIAGNOSIS — R91.8 MULTIPLE LUNG NODULES: Chronic | ICD-10-CM

## 2025-02-28 DIAGNOSIS — Z87.891 FORMER SMOKER: Chronic | ICD-10-CM

## 2025-02-28 PROCEDURE — 71250 CT THORAX DX C-: CPT

## 2025-03-05 ENCOUNTER — TELEPHONE (OUTPATIENT)
Dept: PULMONOLOGY | Facility: CLINIC | Age: 65
End: 2025-03-05
Payer: COMMERCIAL

## 2025-03-05 NOTE — TELEPHONE ENCOUNTER
----- Message from Fausto Caldwell sent at 2/28/2025  3:59 PM CST -----  Please let the patient know that his CT scan from today is essentially stable compared to the previous scan.  There is still a couple of areas in the lungs that point towards inflammation.  He should keep follow-up with Dr. Fowler next week to discuss in more detail.  Thank you.

## 2025-03-06 ENCOUNTER — OFFICE VISIT (OUTPATIENT)
Dept: PULMONOLOGY | Facility: CLINIC | Age: 65
End: 2025-03-06
Payer: COMMERCIAL

## 2025-03-06 VITALS
SYSTOLIC BLOOD PRESSURE: 128 MMHG | OXYGEN SATURATION: 96 % | DIASTOLIC BLOOD PRESSURE: 82 MMHG | BODY MASS INDEX: 30.61 KG/M2 | WEIGHT: 226 LBS | HEIGHT: 72 IN | HEART RATE: 72 BPM

## 2025-03-06 DIAGNOSIS — R91.8 MULTIPLE LUNG NODULES: Primary | ICD-10-CM

## 2025-03-06 DIAGNOSIS — R59.0 MEDIASTINAL ADENOPATHY: ICD-10-CM

## 2025-03-06 PROCEDURE — 99214 OFFICE O/P EST MOD 30 MIN: CPT | Performed by: INTERNAL MEDICINE

## 2025-03-06 NOTE — PROGRESS NOTES
"Background:  Pt w ggo,and ggn, mlad   Chief Complaint  Multiple lung nodules    Subjective    History of Present Illness     Gregorio Callahan is here for follow up with [facility] PULMONARY & CRITICAL CARE MEDICINE.  History of Present Illness  Pt follows up regarding multiple lung nodules with newest scan at end of last month.  He was in the [place] recently.  He finds himself needing to take more deep breaths sometimes. He is not deteriorating from pulmonary standpoint. No antionette exacerbations     Tobacco Use: Medium Risk (3/6/2025)    Patient History     Smoking Tobacco Use: Former     Smokeless Tobacco Use: Never     Passive Exposure: Past      Current Outpatient Medications   Medication Instructions    Albuterol-Budesonide (Airsupra) 90-80 MCG/ACT aerosol 2 puffs, Inhalation, 6 Times Daily    aspirin 81 mg, Daily    atorvastatin (LIPITOR) 80 mg, Oral, Daily    azithromycin (Zithromax Z-Migel) 250 MG tablet Take 2 tablets the first day, then 1 tablet daily for 4 days.    citalopram (CeleXA) 40 MG tablet TAKE 1 TABLET BY MOUTH EVERY DAY    ezetimibe (ZETIA) 10 mg, Oral, Daily    levothyroxine (SYNTHROID) 50 mcg, Oral, Every Early Morning    lisinopril (PRINIVIL,ZESTRIL) 5 mg, Oral, Daily    nitroglycerin (NITROSTAT) 0.4 MG SL tablet TAKE 1 TABLET BY MOUTH EVERY 5 MINUTES UP TO 3 TIMES AS NEEDED FOR CHEST PAIN.    traZODone (DESYREL) 50 mg, Oral, Nightly    vitamin B-12 (CYANOCOBALAMIN) 1,000 mcg, Daily      Objective     Vital Signs:   /82   Pulse 72   Ht 182.9 cm (72\")   Wt 103 kg (226 lb)   SpO2 96% Comment: RA  BMI 30.65 kg/m²   Physical Exam  Constitutional:       Appearance: Normal appearance. He is not ill-appearing or diaphoretic.   Eyes:      Extraocular Movements: Extraocular movements intact.   Pulmonary:      Effort: Pulmonary effort is normal. No respiratory distress.      Breath sounds: No wheezing, rhonchi or rales.   Skin:     Findings: No erythema or rash.   Neurological:      " Mental Status: He is alert.      Result Review  Data Reviewed:    CT Chest Without Contrast Diagnostic    Result Date: 2/28/2025  Impression: 1. Innumerable upper lobe predominant pulmonary nodules demonstrating relative stability. Groundglass nodules within the left lung are also present and stable. Enlarged mediastinal adenopathy is stable from the previous exam. 2. Stable appearance of the chest from previous exam of 8/28/2024. Continued follow-up recommended.    This report was signed and finalized on 2/28/2025 3:44 PM by Dr. Rafi Brush MD.       Personal review of imaging : CT scan shows mult nodule, ground glass density SANTIAGO stable since 1 year ago.  PFT Values          4/17/2024    09:15   Pre Drug PFT Results   FVC 81   FEV1 85   FEF 25-75% 97   FEV1/FVC 83.58                Assessment and Plan    Diagnoses and all orders for this visit:    1. Multiple lung nodules (Primary)  -     CT Chest Without Contrast Diagnostic; Future    2. Mediastinal adenopathy  -     CT Chest Without Contrast Diagnostic; Future    Will continue tx as sarcoid  Call prn any sarcoid like sx or worsening resp status  Continue monitoring the need to take deeper breaths.  Ct does not show signs of progressive lung disease  Follow up ct in 6 mos and PFT to monitor lymph nodes and the ground glass density on the left which we biopsied with ion bronch showing inflammatory pathology.  This is presumed sarcoid.  We discsussed my concerns for potentially missing malignancy or other disease when we only get inflammatory results on biopsy, but at the same time the robotic technology has been highly effective for getting accurate dx of benign disease    Follow Up   Return in about 6 months (around 9/6/2025) for review CT, Spirometry and DLCO.  Patient was given instructions and counseling regarding his condition or for health maintenance advice. Please see specific information pulled into the AVS if appropriate.    Electronically signed by  Milind Fowler MD, 3/6/2025, 15:44 CST

## 2025-03-31 ENCOUNTER — TELEPHONE (OUTPATIENT)
Dept: ONCOLOGY | Facility: CLINIC | Age: 65
End: 2025-03-31

## 2025-03-31 ENCOUNTER — APPOINTMENT (OUTPATIENT)
Dept: ONCOLOGY | Facility: HOSPITAL | Age: 65
End: 2025-03-31
Payer: COMMERCIAL

## 2025-03-31 ENCOUNTER — OFFICE VISIT (OUTPATIENT)
Dept: ONCOLOGY | Facility: CLINIC | Age: 65
End: 2025-03-31
Payer: COMMERCIAL

## 2025-03-31 ENCOUNTER — LAB (OUTPATIENT)
Dept: LAB | Facility: HOSPITAL | Age: 65
End: 2025-03-31
Payer: COMMERCIAL

## 2025-03-31 VITALS
HEART RATE: 78 BPM | WEIGHT: 226 LBS | HEIGHT: 72 IN | TEMPERATURE: 97.8 F | BODY MASS INDEX: 30.61 KG/M2 | DIASTOLIC BLOOD PRESSURE: 80 MMHG | OXYGEN SATURATION: 94 % | RESPIRATION RATE: 16 BRPM | SYSTOLIC BLOOD PRESSURE: 118 MMHG

## 2025-03-31 DIAGNOSIS — D46.9 MDS (MYELODYSPLASTIC SYNDROME): Primary | ICD-10-CM

## 2025-03-31 DIAGNOSIS — D61.818 PANCYTOPENIA: ICD-10-CM

## 2025-03-31 LAB
ALBUMIN SERPL-MCNC: 4.5 G/DL (ref 3.5–5.2)
ALBUMIN/GLOB SERPL: 1.7 G/DL
ALP SERPL-CCNC: 82 U/L (ref 39–117)
ALT SERPL W P-5'-P-CCNC: 21 U/L (ref 1–41)
ANION GAP SERPL CALCULATED.3IONS-SCNC: 12 MMOL/L (ref 5–15)
AST SERPL-CCNC: 29 U/L (ref 1–40)
BASOPHILS # BLD AUTO: 0.01 10*3/MM3 (ref 0–0.2)
BASOPHILS NFR BLD AUTO: 0.5 % (ref 0–1.5)
BILIRUB SERPL-MCNC: 0.7 MG/DL (ref 0–1.2)
BUN SERPL-MCNC: 14 MG/DL (ref 8–23)
BUN/CREAT SERPL: 14.4 (ref 7–25)
CALCIUM SPEC-SCNC: 9 MG/DL (ref 8.6–10.5)
CHLORIDE SERPL-SCNC: 100 MMOL/L (ref 98–107)
CO2 SERPL-SCNC: 25 MMOL/L (ref 22–29)
CREAT SERPL-MCNC: 0.97 MG/DL (ref 0.76–1.27)
DEPRECATED RDW RBC AUTO: 49.7 FL (ref 37–54)
EGFRCR SERPLBLD CKD-EPI 2021: 87.2 ML/MIN/1.73
EOSINOPHIL # BLD AUTO: 0 10*3/MM3 (ref 0–0.4)
EOSINOPHIL NFR BLD AUTO: 0 % (ref 0.3–6.2)
ERYTHROCYTE [DISTWIDTH] IN BLOOD BY AUTOMATED COUNT: 14.6 % (ref 12.3–15.4)
FERRITIN SERPL-MCNC: 449.9 NG/ML (ref 30–400)
GLOBULIN UR ELPH-MCNC: 2.6 GM/DL
GLUCOSE SERPL-MCNC: 93 MG/DL (ref 65–99)
HCT VFR BLD AUTO: 36 % (ref 37.5–51)
HGB BLD-MCNC: 12.3 G/DL (ref 13–17.7)
HOLD SPECIMEN: NORMAL
IMM GRANULOCYTES # BLD AUTO: 0.01 10*3/MM3 (ref 0–0.05)
IMM GRANULOCYTES NFR BLD AUTO: 0.5 % (ref 0–0.5)
IRON 24H UR-MRATE: 82 MCG/DL (ref 59–158)
IRON SATN MFR SERPL: 23 % (ref 20–50)
LYMPHOCYTES # BLD AUTO: 0.74 10*3/MM3 (ref 0.7–3.1)
LYMPHOCYTES NFR BLD AUTO: 35.9 % (ref 19.6–45.3)
MCH RBC QN AUTO: 31.4 PG (ref 26.6–33)
MCHC RBC AUTO-ENTMCNC: 34.2 G/DL (ref 31.5–35.7)
MCV RBC AUTO: 91.8 FL (ref 79–97)
MONOCYTES # BLD AUTO: 0.66 10*3/MM3 (ref 0.1–0.9)
MONOCYTES NFR BLD AUTO: 32 % (ref 5–12)
NEUTROPHILS NFR BLD AUTO: 0.64 10*3/MM3 (ref 1.7–7)
NEUTROPHILS NFR BLD AUTO: 31.1 % (ref 42.7–76)
PLATELET # BLD AUTO: 86 10*3/MM3 (ref 140–450)
PMV BLD AUTO: 10.9 FL (ref 6–12)
POTASSIUM SERPL-SCNC: 4.5 MMOL/L (ref 3.5–5.2)
PROT SERPL-MCNC: 7.1 G/DL (ref 6–8.5)
RBC # BLD AUTO: 3.92 10*6/MM3 (ref 4.14–5.8)
SODIUM SERPL-SCNC: 137 MMOL/L (ref 136–145)
TIBC SERPL-MCNC: 352 MCG/DL (ref 298–536)
TRANSFERRIN SERPL-MCNC: 236 MG/DL (ref 200–360)
WBC NRBC COR # BLD AUTO: 2.06 10*3/MM3 (ref 3.4–10.8)

## 2025-03-31 PROCEDURE — 85025 COMPLETE CBC W/AUTO DIFF WBC: CPT

## 2025-03-31 PROCEDURE — 83540 ASSAY OF IRON: CPT

## 2025-03-31 PROCEDURE — 80053 COMPREHEN METABOLIC PANEL: CPT

## 2025-03-31 PROCEDURE — 84466 ASSAY OF TRANSFERRIN: CPT

## 2025-03-31 PROCEDURE — 82728 ASSAY OF FERRITIN: CPT

## 2025-03-31 PROCEDURE — 36415 COLL VENOUS BLD VENIPUNCTURE: CPT

## 2025-03-31 PROCEDURE — 99214 OFFICE O/P EST MOD 30 MIN: CPT | Performed by: NURSE PRACTITIONER

## 2025-03-31 NOTE — LETTER
March 31, 2025     Patient: Gregorio Callahan   YOB: 1960   Date of Visit: 3/31/2025       To Whom It May Concern:    It is my medical opinion that Gregorio Callahan should remain out of work until Tuesday, April 8, 2025 .  This is contingent on his lab results on Monday April 7, 2025.  He may need to be out longer.             Sincerely,        CHRISTIANA Torres    CC: No Recipients

## 2025-03-31 NOTE — PROGRESS NOTES
[facility] HEMATOLOGY & ONCOLOGY  [address]  [place] [address] [phone]    Patient Name: Gregorio Callahan  Encounter Date: 03/31/2025   YOB: 1960  Patient Number: [id number]     PROGRESS NOTE   HISTORY OF PRESENT ILLNESS: Gregorio Callahan is a 65 y.o. male is followed by this office for MDS.   History is considered to be accurate.    He has health history significant for HTN, CAD, Hyperlipidemia, Hx of MI (2007), Ex Tobacco user:  quit in 2007     He had colonoscopy 3/21/22:   - The entire examined colon is normal on direct and retroflexion views. No specimens collected.  10 year recall.      Drinks beer appox a case per month month    Lost 5 pounds in 6 weeks unintentionally  Reports cough, Night sweats for the past 4  weeks, light headed.    Pt was seen in consultation on 3/7/24.  Ordered CT Neck, Chest, Abdomen, Pelvis     CT Neck 3/15/24  1. No neck mass or evidence of lymphadenopathy. No acute findings.   2. Questionable cerebellar tonsillar ectopia and possible Chiari I malformation, partially obscured by streak artifact. Consider follow-up with MRI of the brain.   CT Chest 3/15/24  1. Innumerable bilateral groundglass and solid pulmonary nodules, some of which appear cavitary. This has a broad differential which would include septic emboli, metastatic disease, atypical infection (fungal mycobacterial) or vasculitis.  2. Mild mediastinal adenopathy.  3. Borderline heart size with heavily calcified coronary arteries versus stent material.   4. Liver contour appears somewhat nodular. There may be mild splenomegaly. Correlate for chronic liver disease.  5. Indeterminate round sclerotic focus in the LEFT posterior fourth rib.  Recommend correlation with serum PSA and consider nuclear medicine bone scan if clinically indicated.    CT Abdomen 3/15/24  No acute findings in the abdomen/pelvis.    Minimal ectasia of the infrarenal  abdominal aorta measuring up to 2.6 cm.    Per radiologist's recommendation, pt went to ER 3/16/24  to rule out septic emboli.  Preliminary blood culture negative.  Weight is stable.  Urgency and headaches have improved.    Still has fatigue and shortness of breath       FISH for MDS negative.  SPEP with no M Ángel.  Flow Cytometry showed increased monocytes 25% of leukocytes. Elevated monocytes could be related to decreased neutrophils at [facility] has been normal .    PET Scan 3/27/24  1.  Innumerable groundglass nodules and surrounding groundglass opacity in the lungs persist and exhibit low-grade for uptake. These may be infectious or neoplastic. These are likely too small for percutaneous biopsy.  2.  Multiple enlarged mediastinal and bilateral hilar lymph nodes are more intensely hypermetabolic. These could potentially be biopsied under EBUS guidance. These may also be infectious/reactive and short interval follow-up could be considered.  3.  Hypermetabolic lymph node at the level of the josie splenic vein confluence.      INTERVAL HISTORY     History of Present Illness  The patient presents for a follow-up of myelodysplastic syndrome. He is accompanied by his wife via phone.    He reports no significant health issues since his last visit. However, his wife has observed an increase in his fatigue levels, which he himself did not perceive as a problem. He has not required any injections or other interventions. He has not experienced any fevers or weight loss and maintains a good appetite. He continues to work and does not report feeling unwell, with the exception of increased fatigue.          LABS    Lab Results - Last 18 Months   Lab Units 03/31/25  0728 02/14/25  1005 02/04/25  1341 09/16/24  0806 08/06/24  0723 05/30/24  1152 04/23/24  0835 04/08/24  0751 03/19/24  0727 03/16/24  1402 03/07/24  1253 03/07/24  1253   HEMOGLOBIN g/dL 12.3*  --  12.3* 14.0 12.9* 13.2 13.2 12.3* 12.2* 12.3*  --  12.2*   HEMATOCRIT %  36.0*  --  38.1 41.8 39.6 38.7 39.6 37.4* 37.1* 37.5  --  37.3*   MCV fL 91.8  --  95.0 96.3 96.8 90 92.3 92.3 91.8 93.1  --  92.1   WBC 10*3/mm3 2.06*  --  2.63* 3.04* 3.06* 3.1* 2.33* 2.01* 3.05* 2.81*  --  3.64   RDW % 14.6  --  13.9 14.1 14.5 14.5 14.9 15.3 15.4 14.7  --  14.7   MPV fL 10.9  --   --   --   --   --  11.0 10.4 10.0 10.3  --  9.8   PLATELETS 10*3/mm3 86*  --  107* 109* 120* 107* 91* 99* 141 157  --  163   IMM GRAN % % 0.5  --   --   --   --   --   --   --   --  0.7*  --   --    NEUTROS ABS 10*3/mm3 0.64*  --  1.40* 1.28* 1.37* 1.2* 0.91* 0.83* 1.26* 1.46*   < > 1.86   EXTERNAL NEUTROPHILS ABS x10(3)/ul  --  0.5*  --   --   --   --   --   --   --   --   --   --    LYMPHS ABS 10*3/mm3 0.74  --  0.88 CANCELED  0.97 CANCELED  0.99 1.0  --  0.70  --  0.85   < >  --    MONOS ABS 10*3/mm3 0.66  --  0.35 0.73 0.70 0.8  --  0.45  --  0.46   < >  --    EOS ABS 10*3/mm3 0.00  --   --  CANCELED CANCELED 0.0  --  0.01  --  0.01   < > 0.07   EOSINOPHIL ABS 10*3/mm3  --   --   --  0.00  --   --   --   --   --   --   --   --    EOSINOPHIL % %  --   --   --  0.0*  --   --   --   --   --   --   --   --    BASOS ABS 10*3/mm3 0.01  --   --  CANCELED  0.06 CANCELED 0.0 0.02 0.01  --  0.01   < >  --    IMMATURE GRANS (ABS) 10*3/mm3 0.01  --   --   --   --   --   --   --   --  0.02  --   --    NRBC /100 WBC  --   --  0.0  --   --   --   --   --   --  0.0  --  1.0*   NEUTROPHIL % %  --   --  53.1 42.0* 44.8  --  39.0*  --  41.4*  --   --  46.0   MONOCYTES % %  --  33* 13.3* 24.0* 22.9*  --  21.0*  --  25.3*  --   --  26.0*   BASOPHIL % %  --  0  --  2.0*  --   --  1.0  --   --   --   --   --    ATYP LYMPH % %  --   --   --   --   --   --  11.0*  --  15.2*  --   --  2.0   ANISOCYTOSIS   --  1+  --   --   --   --  Slight/1+  --  Slight/1+  --   --  Slight/1+   GIANT PLT   --   --   --   --   --   --   --   --   --   --   --  Slight/1+    < > = values in this interval not displayed.       Lab Results - Last 18 Months    Lab Units 02/04/25  1341 09/16/24  0806 08/06/24  0723 05/30/24  1152 05/06/24  1258 04/08/24  0751 03/19/24  0727 03/16/24  1402 03/15/24  1626 03/07/24  1253   GLUCOSE mg/dL 91 89 87 95 132* 135* 76 125*  --  85   SODIUM mmol/L 139 139 144 137 140 141 139 140  --  139   POTASSIUM mmol/L 3.8 4.3 4.3 3.8 4.0 3.8 4.3 4.1  --  4.6   CO2 mmol/L 28.3 26.4 25.0 22 26.0 27.0 29.0 28.0  --  27.0   CHLORIDE mmol/L 100 102 106 102 105 106 101 105  --  100   ANION GAP mmol/L  --   --   --   --  9.0 8.0 9.0 7.0  --  12.0   CREATININE mg/dL 1.15 1.04 1.05 1.06 0.85 0.88 0.82 0.79   < > 0.90   BUN mg/dL 17 12 15 18 12 9 11 13  --  13   BUN / CREAT RATIO  14.8 11.5 14.3 17 14.1 10.2 13.4 16.5  --  14.4   CALCIUM mg/dL 9.9 9.6 9.4 9.0 9.1 9.1 9.3 9.3  --  9.1   ALK PHOS U/L 98 99 91 89  --  88 88 110  --  88   TOTAL PROTEIN g/dL 7.3 7.2 6.8 7.0  --  6.8 7.2  6.7 7.4  --  7.8   ALT (SGPT) U/L 24 22 25 23  --  19 32 35  --  65*   AST (SGOT) U/L 32 31 31 34  --  26 28 29  --  52*   BILIRUBIN mg/dL 0.6 0.7 0.7 0.7  --  0.5 0.5 0.3  --  0.7   ALBUMIN g/dL 4.7 4.7 4.7 4.7  --  4.4 4.3  3.8 4.4  --  4.4   GLOBULIN gm/dL  --   --   --   --   --  2.4 2.9 3.0  --  3.4   GLOBULINREF gm/dL 2.6 2.5 2.1 2.3  --   --  2.9  --   --   --     < > = values in this interval not displayed.       Lab Results - Last 18 Months   Lab Units 04/08/24  0751 03/19/24  0727 03/07/24  1253   M-SPIKE g/dL  --  Not Observed  --    KAPPA/LAMBDA RATIO, S   --  1.56  --    FREE LAMBDA LIGHT CHAINS mg/L  --  15.8  --    IG KAPPA FREE LIGHT CHAIN mg/L  --  24.7*  --    CEA ng/mL 1.65  --   --    LDH U/L  --  365* 492*   REFERENCE LAB REPORT   --   --  See Attached Report       Lab Results - Last 18 Months   Lab Units 03/31/25  0728 02/04/25  1341 09/16/24  0806 05/30/24  1152 03/19/24  0727 03/07/24  1253 02/01/24  0717   IRON mcg/dL  --   --   --   --  54* 33*  --    TIBC mcg/dL  --   --  356 290 329 288*  --    IRON SATURATION %  --   --  28 26  --   --   --     IRON SATURATION (TSAT) %  --   --   --   --  16* 11*  --    FERRITIN ng/mL 449.90*  --  506.00* 428* 644.80* 1,226.00*  --    TSH uIU/mL  --  1.960  --   --   --   --  2.180   FOLATE ng/mL  --   --   --   --   --  15.10  --          PAST MEDICAL HISTORY:  ALLERGIES:  No Known Allergies  CURRENT MEDICATIONS:  Outpatient Encounter Medications as of 3/31/2025   Medication Sig Dispense Refill    Albuterol-Budesonide (Airsupra) 90-80 MCG/ACT aerosol Inhale 2 puffs 6 (Six) Times a Day. 10.7 g 3    aspirin 81 MG EC tablet Take 1 tablet by mouth Daily.      atorvastatin (LIPITOR) 80 MG tablet Take 1 tablet by mouth Daily. 90 tablet 3    citalopram (CeleXA) 40 MG tablet TAKE 1 TABLET BY MOUTH EVERY DAY 90 tablet 3    ezetimibe (ZETIA) 10 MG tablet Take 1 tablet by mouth Daily for 360 days. 90 tablet 3    levothyroxine (Synthroid) 50 MCG tablet Take 1 tablet by mouth Every Morning. 90 tablet 1    lisinopril (PRINIVIL,ZESTRIL) 5 MG tablet Take 1 tablet by mouth Daily. 90 tablet 3    nitroglycerin (NITROSTAT) 0.4 MG SL tablet TAKE 1 TABLET BY MOUTH EVERY 5 MINUTES UP TO 3 TIMES AS NEEDED FOR CHEST PAIN. 25 tablet 2    traZODone (DESYREL) 50 MG tablet Take 1 tablet by mouth Every Night. 180 tablet 3    vitamin B-12 (CYANOCOBALAMIN) 1000 MCG tablet Take 1 tablet by mouth Daily.       No facility-administered encounter medications on file as of 3/31/2025.     ADULT ILLNESSES:  Patient Active Problem List   Diagnosis Code    Mixed hyperlipidemia E78.2    Benign essential hypertension I10    Coronary artery disease involving native coronary artery of native heart without angina pectoris I25.10    H/O acute myocardial infarction I25.2    History of PTCA Z98.61    SZYMANSKI (dyspnea on exertion) R06.09    Encounter for screening for malignant neoplasm of colon Z12.11    Multiple lung nodules R91.8    Mediastinal adenopathy R59.0    MDS (myelodysplastic syndrome) D46.9       HEALTH MAINTENANCE ITEMS:  Health Maintenance Due   Topic Date  Due    Pneumococcal Vaccine 50+ (3 of 3 - PCV20 or PCV21) 10/23/2023    COVID-19 Vaccine (6 - 2024-25 season) 09/01/2024       <no information>  Last Completed Colonoscopy            Upcoming       COLORECTAL CANCER SCREENING (COLONOSCOPY - Every 10 Years) Next due on 3/21/2032      03/14/2024  Occult Blood X 3, Stool - Stool, Per Rectum    03/21/2022  COLONOSCOPY    03/21/2022  Surgical Procedure: COLONOSCOPY    10/23/2018  Cologuard - Stool, Per Rectum    04/05/2011  COLONOSCOPY (Done)     Only the first 5 history entries have been loaded, but more history exists.                        Immunization History   Administered Date(s) Administered    COVID-19 (MODERNA) 1st,2nd,3rd Dose Monovalent 03/16/2021, 04/13/2021, 11/11/2021    COVID-19 (PFIZER) 12YRS+ (COMIRNATY) 10/19/2023    COVID-19 (PFIZER) BIVALENT 12+YRS 10/25/2022    Flu Vaccine Intradermal Quad 18-64YR 10/18/2017    Flu Vaccine Quad PF >36MO 11/12/2016    Fluad Quad 65+ 08/28/2020    Fluzone  >6mos 10/04/2024    Fluzone (or Fluarix & Flulaval for VFC) >6mos 10/25/2021, 10/25/2022, 10/11/2023    Pneumococcal Conjugate 13-Valent (PCV13) 08/10/2016    Pneumococcal Polysaccharide (PPSV23) 10/23/2018    RSV, unspecified (Vaccine or MAB) 01/08/2025    Shingrix 11/18/2019, 07/16/2020    Tdap 10/18/2017    flucelvax quad pfs =>4 YRS 10/23/2018, 11/12/2019     Last Completed Mammogram    This patient has no relevant Health Maintenance data.           FAMILY HISTORY:  Family History   Problem Relation Age of Onset    Heart disease Mother     Alzheimer's disease Father     Heart attack Sister     Heart attack Brother     No Known Problems Maternal Grandmother     No Known Problems Maternal Grandfather     No Known Problems Paternal Grandmother     No Known Problems Paternal Grandfather     Colon cancer Neg Hx     Colon polyps Neg Hx      SOCIAL HISTORY:  Social History     Socioeconomic History    Marital status:    Tobacco Use    Smoking status: Former      "Current packs/day: 0.00     Average packs/day: 3.0 packs/day for 30.0 years (90.0 ttl pk-yrs)     Types: Cigarettes     Start date: 1977     Quit date: 2007     Years since quittin.2     Passive exposure: Past    Smokeless tobacco: Never    Tobacco comments:     age quit smokin   Vaping Use    Vaping status: Never Used   Substance and Sexual Activity    Alcohol use: Yes     Comment: Occasional alcohol use. Drinks beer.    Drug use: No    Sexual activity: Defer       REVIEW OF SYSTEMS:  Review of Systems   Constitutional:  Positive for fatigue and unexpected weight loss (5 pounds in 4 weeks, pt was on vacation and ate welll). Negative for activity change, appetite change, fever and unexpected weight gain.   HENT:  Negative for dental problem, facial swelling, swollen glands and trouble swallowing.    Eyes:  Negative for double vision and discharge.   Respiratory:  Positive for shortness of breath (Chronic but worsening). Negative for cough and wheezing.    Cardiovascular:  Negative for chest pain, palpitations and leg swelling.   Gastrointestinal:  Negative for abdominal pain, blood in stool, nausea and vomiting.   Endocrine: Negative.    Genitourinary:  Positive for urgency (Acute) and urinary incontinence. Negative for dysuria and hematuria.   Musculoskeletal:  Negative for arthralgias and myalgias.   Skin:  Negative for rash, skin lesions and wound.   Allergic/Immunologic: Negative for immunocompromised state.   Neurological:  Positive for headache (intermittent, rapid pain to right side of head). Negative for speech difficulty, light-headedness, memory problem and confusion.   Hematological:  Negative for adenopathy.   Psychiatric/Behavioral:  Negative for dysphoric mood, self-injury, suicidal ideas and depressed mood. The patient is not nervous/anxious.        /80   Pulse 78   Temp 97.8 °F (36.6 °C)   Resp 16   Ht 182.9 cm (72\")   Wt 103 kg (226 lb)   SpO2 94%   BMI 30.65 kg/m²  " Body surface area is 2.25 meters squared.    Pain Score    03/31/25 0738   PainSc: 0-No pain            Physical Exam  Constitutional:       Appearance: Normal appearance.   HENT:      Head: Normocephalic and atraumatic.   Cardiovascular:      Rate and Rhythm: Normal rate and regular rhythm.   Pulmonary:      Effort: Pulmonary effort is normal.      Breath sounds: Normal breath sounds.   Abdominal:      General: Bowel sounds are normal.      Palpations: Abdomen is soft.   Musculoskeletal:      Right lower leg: No edema.      Left lower leg: No edema.   Skin:     General: Skin is warm and dry.   Neurological:      Mental Status: He is alert and oriented to person, place, and time.   Psychiatric:         Attention and Perception: Attention normal.         Mood and Affect: Mood normal.         Judgment: Judgment normal.         Gregorio Callahan reports a pain score of 0.  Given his pain assessment as noted, treatment options were discussed and the following options were decided upon as a follow-up plan to address the patient's pain:  no intervention indicated .      ASSESSMENT / PLAN:    No diagnosis found.    2.  MDS  -Bone marrow 4/23/2024: Findings consistent with myelodysplastic neoplasm with low blast count  Flow cytometry partial CD56 on monocytes  Hematopathology: None necrotizing granuloma  Hypercellular marrow for age with relative myeloid hypoplasia, left shift, adequate megakaryocytes and no increase in blast and a benign lymphocyte aggregate  Storage iron present  Normal FISH for MDS  Cytogenics: Abnormal male karyotype.  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 Goldstein at [facility] who felt pt at low risk and not a candidate for treatment considering his stable cell counts.   When cytopenias progress, can refer pt back to treatment recommendations or consideration for transplant.  -Labs today: WBC 2.06, Hgb 12.3, Hct 36.0, Platelets 86, ANC 0.64   -As pt now has neutropenia and thrombocytopenia, will reach out to Dr. Hope's office for re-eval.   -Will order Filgrastim 300 mcg weekly for ANC< 1.  Continue Filgrastim weekly for ANC < 1.5.  -Advised pt of neutropenic precautions and advise distancing himself from sick people.    -Advised of bleeding precautions.   -He will go to ER for acute symptoms including fever  >/= 100.4  -Gave him an excuse for work.       2.  Lung Nodules on CT Scan   CT Chest Without Contrast Diagnostic (02/28/2025 14:26)    PET Scan 3/27/24  1.  Innumerable groundglass nodules and surrounding groundglass opacity in the lungs persist and exhibit low-grade for uptake. These may be infectious or neoplastic. These are likely too small for percutaneous biopsy.  2.  Multiple enlarged mediastinal and bilateral hilar lymph nodes are more intensely hypermetabolic. These could potentially be biopsied under EBUS guidance. These may also be infectious/reactive and short interval follow-up could be considered.  3.  Hypermetabolic lymph node at the level of the josie splenic vein confluence.  -Pt saw pulmonology and had biopsy 5/15/24.  All samples were negative for malignancy        PLAN:   Advised pt of neutropenic precautions   Start Filgrastrim once approved.   Weekly labs and continue Filgrastim for ANC < 1.5  Will refer back to Dr. Goldstein at [facility]   Continue current medications, treatment plans and follow up with PCP and any other providers  RTC in 6 weeks     Care discussed with patient.  Understanding expressed.  Patient agreeable with plan.         Twila Wick, APRN  03/31/2025

## 2025-03-31 NOTE — TELEPHONE ENCOUNTER
Pt is scheduled with Dr Goldstein at [facility] on 4/11/25 at 10am. [phone].  Pt is aware

## 2025-04-04 ENCOUNTER — PATIENT ROUNDING (BHMG ONLY) (OUTPATIENT)
Dept: ONCOLOGY | Facility: CLINIC | Age: 65
End: 2025-04-04
Payer: COMMERCIAL

## 2025-04-04 NOTE — PROGRESS NOTES
April 4, 2025    Hello, may I speak with Gregorio Callahan?    My name is Sunni      I am  with [facility] HEMATOLOGY & ONCOLOGY  [address]  [place] [address] [phone].    Before we get started may I verify your date of birth? 1960    I am calling to officially welcome you to our practice and ask about your recent visit. Is this a good time to talk? yes    Tell me about your visit with us. What things went well?  Everything went really well. You guys always do a great job.        We're always looking for ways to make our patients' experiences even better. Do you have recommendations on ways we may improve?  no    Overall were you satisfied with your first visit to our practice? yes       I appreciate you taking the time to speak with me today. Is there anything else I can do for you? no      Thank you, and have a great day.

## 2025-04-10 ENCOUNTER — TELEPHONE (OUTPATIENT)
Dept: ONCOLOGY | Facility: CLINIC | Age: 65
End: 2025-04-10
Payer: COMMERCIAL

## 2025-04-10 ENCOUNTER — DOCUMENTATION (OUTPATIENT)
Dept: ONCOLOGY | Facility: CLINIC | Age: 65
End: 2025-04-10
Payer: COMMERCIAL

## 2025-04-10 NOTE — PROGRESS NOTES
Insurance requires ANC to be < 0.5 to get Zarxio if the diagnosis is MDS.    Will change order parameters.  Pt did not get lab drawn on 4/7 as shot was cancelled.  He is seeing Hematology tomorrow in [place].      Twila Wick, APRN  04/10/2025

## 2025-04-10 NOTE — TELEPHONE ENCOUNTER
----- Message from Renetta GALLEGOS sent at 4/10/2025 11:10 AM CDT -----  Regarding: Zarxio Denial  Good afternoon, I haven't received a fax just yet for further steps but wanted to reach out and let you know the Zarxio for this patient has been denied per [facility]. The denial is related to not being on any chemo tx or any tx that would cause neutropenia.  Please let me know if you want to look into a p2p and or further auths.  Thank you.

## 2025-04-21 ENCOUNTER — LAB (OUTPATIENT)
Dept: LAB | Facility: HOSPITAL | Age: 65
End: 2025-04-21
Payer: COMMERCIAL

## 2025-04-21 ENCOUNTER — APPOINTMENT (OUTPATIENT)
Dept: ONCOLOGY | Facility: HOSPITAL | Age: 65
End: 2025-04-21
Payer: COMMERCIAL

## 2025-04-21 LAB
BASOPHILS # BLD AUTO: 0.01 10*3/MM3 (ref 0–0.2)
BASOPHILS NFR BLD AUTO: 0.4 % (ref 0–1.5)
DEPRECATED RDW RBC AUTO: 48.7 FL (ref 37–54)
EOSINOPHIL # BLD AUTO: 0 10*3/MM3 (ref 0–0.4)
EOSINOPHIL NFR BLD AUTO: 0 % (ref 0.3–6.2)
ERYTHROCYTE [DISTWIDTH] IN BLOOD BY AUTOMATED COUNT: 14.6 % (ref 12.3–15.4)
HCT VFR BLD AUTO: 36.9 % (ref 37.5–51)
HGB BLD-MCNC: 12.7 G/DL (ref 13–17.7)
IMM GRANULOCYTES # BLD AUTO: 0.01 10*3/MM3 (ref 0–0.05)
IMM GRANULOCYTES NFR BLD AUTO: 0.4 % (ref 0–0.5)
LYMPHOCYTES # BLD AUTO: 0.87 10*3/MM3 (ref 0.7–3.1)
LYMPHOCYTES NFR BLD AUTO: 38 % (ref 19.6–45.3)
MCH RBC QN AUTO: 31.3 PG (ref 26.6–33)
MCHC RBC AUTO-ENTMCNC: 34.4 G/DL (ref 31.5–35.7)
MCV RBC AUTO: 90.9 FL (ref 79–97)
MONOCYTES # BLD AUTO: 0.7 10*3/MM3 (ref 0.1–0.9)
MONOCYTES NFR BLD AUTO: 30.6 % (ref 5–12)
NEUTROPHILS NFR BLD AUTO: 0.7 10*3/MM3 (ref 1.7–7)
NEUTROPHILS NFR BLD AUTO: 30.6 % (ref 42.7–76)
PLATELET # BLD AUTO: 92 10*3/MM3 (ref 140–450)
PMV BLD AUTO: 11.1 FL (ref 6–12)
RBC # BLD AUTO: 4.06 10*6/MM3 (ref 4.14–5.8)
WBC NRBC COR # BLD AUTO: 2.29 10*3/MM3 (ref 3.4–10.8)

## 2025-04-28 ENCOUNTER — TELEPHONE (OUTPATIENT)
Dept: ONCOLOGY | Facility: CLINIC | Age: 65
End: 2025-04-28
Payer: COMMERCIAL

## 2025-04-28 ENCOUNTER — LAB (OUTPATIENT)
Dept: LAB | Facility: HOSPITAL | Age: 65
End: 2025-04-28
Payer: COMMERCIAL

## 2025-04-28 ENCOUNTER — APPOINTMENT (OUTPATIENT)
Dept: ONCOLOGY | Facility: HOSPITAL | Age: 65
End: 2025-04-28
Payer: COMMERCIAL

## 2025-04-28 DIAGNOSIS — D61.818 PANCYTOPENIA: ICD-10-CM

## 2025-04-28 DIAGNOSIS — D46.9 MDS (MYELODYSPLASTIC SYNDROME): ICD-10-CM

## 2025-04-28 LAB
BASOPHILS # BLD AUTO: 0 10*3/MM3 (ref 0–0.2)
BASOPHILS NFR BLD AUTO: 0 % (ref 0–1.5)
DEPRECATED RDW RBC AUTO: 47.7 FL (ref 37–54)
EOSINOPHIL # BLD AUTO: 0.01 10*3/MM3 (ref 0–0.4)
EOSINOPHIL NFR BLD AUTO: 0.3 % (ref 0.3–6.2)
ERYTHROCYTE [DISTWIDTH] IN BLOOD BY AUTOMATED COUNT: 14.6 % (ref 12.3–15.4)
HCT VFR BLD AUTO: 38.3 % (ref 37.5–51)
HGB BLD-MCNC: 13.3 G/DL (ref 13–17.7)
IMM GRANULOCYTES # BLD AUTO: 0.02 10*3/MM3 (ref 0–0.05)
IMM GRANULOCYTES NFR BLD AUTO: 0.6 % (ref 0–0.5)
LYMPHOCYTES # BLD AUTO: 1.1 10*3/MM3 (ref 0.7–3.1)
LYMPHOCYTES NFR BLD AUTO: 35.3 % (ref 19.6–45.3)
MCH RBC QN AUTO: 31.3 PG (ref 26.6–33)
MCHC RBC AUTO-ENTMCNC: 34.7 G/DL (ref 31.5–35.7)
MCV RBC AUTO: 90.1 FL (ref 79–97)
MONOCYTES # BLD AUTO: 0.96 10*3/MM3 (ref 0.1–0.9)
MONOCYTES NFR BLD AUTO: 30.8 % (ref 5–12)
NEUTROPHILS NFR BLD AUTO: 1.03 10*3/MM3 (ref 1.7–7)
NEUTROPHILS NFR BLD AUTO: 33 % (ref 42.7–76)
PLATELET # BLD AUTO: 102 10*3/MM3 (ref 140–450)
PMV BLD AUTO: 10.7 FL (ref 6–12)
RBC # BLD AUTO: 4.25 10*6/MM3 (ref 4.14–5.8)
WBC NRBC COR # BLD AUTO: 3.12 10*3/MM3 (ref 3.4–10.8)

## 2025-04-28 PROCEDURE — 36415 COLL VENOUS BLD VENIPUNCTURE: CPT

## 2025-04-28 PROCEDURE — 85025 COMPLETE CBC W/AUTO DIFF WBC: CPT

## 2025-04-28 NOTE — TELEPHONE ENCOUNTER
Caller: Gregorio Callahan    Relationship: Self    Best call back number: [phone]    What is the best time to reach you: ANYTIME    Who are you requesting to speak with (clinical staff, provider,  specific staff member): CLINICAL        What was the call regarding: WANTED TO KNOW IF IS OK TO GO TO THE DENTIST OR NOT?     Is it okay if the provider responds through [facility]: YES

## 2025-05-02 DIAGNOSIS — I10 BENIGN ESSENTIAL HYPERTENSION: ICD-10-CM

## 2025-05-02 RX ORDER — LISINOPRIL 5 MG/1
5 TABLET ORAL DAILY
Qty: 90 TABLET | Refills: 3 | Status: SHIPPED | OUTPATIENT
Start: 2025-05-02

## 2025-05-02 NOTE — TELEPHONE ENCOUNTER
Rx Refill Note  Requested Prescriptions     Pending Prescriptions Disp Refills    lisinopril (PRINIVIL,ZESTRIL) 5 MG tablet [Pharmacy Med Name: LISINOPRIL 5 MG TABLET] 90 tablet 3     Sig: TAKE 1 TABLET BY MOUTH EVERY DAY      Last office visit with prescribing clinician: 2/4/2025   Last telemedicine visit with prescribing clinician: Visit date not found   Next office visit with prescribing clinician: 8/5/2025       {TIP  Please add Last Relevant Lab 4/11/25    Kat Mathew MA  05/02/25, 15:15 CDT

## 2025-05-05 ENCOUNTER — LAB (OUTPATIENT)
Dept: LAB | Facility: HOSPITAL | Age: 65
End: 2025-05-05
Payer: COMMERCIAL

## 2025-05-05 ENCOUNTER — APPOINTMENT (OUTPATIENT)
Dept: ONCOLOGY | Facility: HOSPITAL | Age: 65
End: 2025-05-05
Payer: COMMERCIAL

## 2025-05-05 DIAGNOSIS — D46.9 MDS (MYELODYSPLASTIC SYNDROME): ICD-10-CM

## 2025-05-05 DIAGNOSIS — D61.818 PANCYTOPENIA: ICD-10-CM

## 2025-05-05 LAB
ANISOCYTOSIS BLD QL: ABNORMAL
BASOPHILS # BLD MANUAL: 0 10*3/MM3 (ref 0–0.2)
BASOPHILS NFR BLD MANUAL: 0 % (ref 0–1.5)
BURR CELLS BLD QL SMEAR: ABNORMAL
CYTOLOGIST CVX/VAG CYTO: NORMAL
DEPRECATED RDW RBC AUTO: 48.1 FL (ref 37–54)
ELLIPTOCYTES BLD QL SMEAR: ABNORMAL
EOSINOPHIL # BLD MANUAL: 0 10*3/MM3 (ref 0–0.4)
EOSINOPHIL NFR BLD MANUAL: 0 % (ref 0.3–6.2)
ERYTHROCYTE [DISTWIDTH] IN BLOOD BY AUTOMATED COUNT: 14.4 % (ref 12.3–15.4)
HCT VFR BLD AUTO: 37.6 % (ref 37.5–51)
HGB BLD-MCNC: 12.8 G/DL (ref 13–17.7)
LYMPHOCYTES # BLD MANUAL: 1 10*3/MM3 (ref 0.7–3.1)
LYMPHOCYTES NFR BLD MANUAL: 28 % (ref 5–12)
MCH RBC QN AUTO: 30.9 PG (ref 26.6–33)
MCHC RBC AUTO-ENTMCNC: 34 G/DL (ref 31.5–35.7)
MCV RBC AUTO: 90.8 FL (ref 79–97)
METAMYELOCYTES NFR BLD MANUAL: 1 % (ref 0–0)
MONOCYTES # BLD: 0.72 10*3/MM3 (ref 0.1–0.9)
NEUTROPHILS # BLD AUTO: 0.82 10*3/MM3 (ref 1.7–7)
NEUTROPHILS NFR BLD MANUAL: 31 % (ref 42.7–76)
NEUTS BAND NFR BLD MANUAL: 1 % (ref 0–5)
OVALOCYTES BLD QL SMEAR: ABNORMAL
PATH INTERP BLD-IMP: NORMAL
PLATELET # BLD AUTO: 93 10*3/MM3 (ref 140–450)
PMV BLD AUTO: 11 FL (ref 6–12)
POIKILOCYTOSIS BLD QL SMEAR: ABNORMAL
RBC # BLD AUTO: 4.14 10*6/MM3 (ref 4.14–5.8)
SMALL PLATELETS BLD QL SMEAR: ABNORMAL
VARIANT LYMPHS NFR BLD MANUAL: 12 % (ref 0–5)
VARIANT LYMPHS NFR BLD MANUAL: 27 % (ref 19.6–45.3)
WBC MORPH BLD: NORMAL
WBC NRBC COR # BLD AUTO: 2.56 10*3/MM3 (ref 3.4–10.8)

## 2025-05-05 PROCEDURE — 85025 COMPLETE CBC W/AUTO DIFF WBC: CPT

## 2025-05-05 PROCEDURE — 36415 COLL VENOUS BLD VENIPUNCTURE: CPT

## 2025-05-05 PROCEDURE — 85007 BL SMEAR W/DIFF WBC COUNT: CPT

## 2025-05-05 PROCEDURE — 85060 BLOOD SMEAR INTERPRETATION: CPT

## 2025-05-12 ENCOUNTER — OFFICE VISIT (OUTPATIENT)
Dept: ONCOLOGY | Facility: CLINIC | Age: 65
End: 2025-05-12
Payer: COMMERCIAL

## 2025-05-12 ENCOUNTER — LAB (OUTPATIENT)
Dept: LAB | Facility: HOSPITAL | Age: 65
End: 2025-05-12
Payer: COMMERCIAL

## 2025-05-12 ENCOUNTER — APPOINTMENT (OUTPATIENT)
Dept: ONCOLOGY | Facility: HOSPITAL | Age: 65
End: 2025-05-12
Payer: COMMERCIAL

## 2025-05-12 VITALS
TEMPERATURE: 97.8 F | DIASTOLIC BLOOD PRESSURE: 76 MMHG | HEIGHT: 72 IN | BODY MASS INDEX: 30.05 KG/M2 | SYSTOLIC BLOOD PRESSURE: 118 MMHG | RESPIRATION RATE: 16 BRPM | OXYGEN SATURATION: 96 % | WEIGHT: 221.9 LBS | HEART RATE: 63 BPM

## 2025-05-12 DIAGNOSIS — R91.8 MULTIPLE LUNG NODULES ON CT: ICD-10-CM

## 2025-05-12 DIAGNOSIS — D46.9 MDS (MYELODYSPLASTIC SYNDROME): Primary | ICD-10-CM

## 2025-05-12 DIAGNOSIS — D61.818 PANCYTOPENIA: ICD-10-CM

## 2025-05-12 LAB
ALBUMIN SERPL-MCNC: 4.7 G/DL (ref 3.5–5.2)
ALBUMIN/GLOB SERPL: 2 G/DL
ALP SERPL-CCNC: 85 U/L (ref 39–117)
ALT SERPL W P-5'-P-CCNC: 22 U/L (ref 1–41)
ANION GAP SERPL CALCULATED.3IONS-SCNC: 10 MMOL/L (ref 5–15)
AST SERPL-CCNC: 31 U/L (ref 1–40)
BASOPHILS # BLD AUTO: 0 10*3/MM3 (ref 0–0.2)
BASOPHILS NFR BLD AUTO: 0 % (ref 0–1.5)
BILIRUB SERPL-MCNC: 0.7 MG/DL (ref 0–1.2)
BUN SERPL-MCNC: 12 MG/DL (ref 8–23)
BUN/CREAT SERPL: 13 (ref 7–25)
CALCIUM SPEC-SCNC: 9.2 MG/DL (ref 8.6–10.5)
CHLORIDE SERPL-SCNC: 104 MMOL/L (ref 98–107)
CO2 SERPL-SCNC: 26 MMOL/L (ref 22–29)
CREAT SERPL-MCNC: 0.92 MG/DL (ref 0.76–1.27)
DEPRECATED RDW RBC AUTO: 49.3 FL (ref 37–54)
EGFRCR SERPLBLD CKD-EPI 2021: 92.9 ML/MIN/1.73
EOSINOPHIL # BLD AUTO: 0 10*3/MM3 (ref 0–0.4)
EOSINOPHIL NFR BLD AUTO: 0 % (ref 0.3–6.2)
ERYTHROCYTE [DISTWIDTH] IN BLOOD BY AUTOMATED COUNT: 14.6 % (ref 12.3–15.4)
GLOBULIN UR ELPH-MCNC: 2.3 GM/DL
GLUCOSE SERPL-MCNC: 86 MG/DL (ref 65–99)
HCT VFR BLD AUTO: 36.1 % (ref 37.5–51)
HGB BLD-MCNC: 12 G/DL (ref 13–17.7)
HOLD SPECIMEN: NORMAL
IMM GRANULOCYTES # BLD AUTO: 0.09 10*3/MM3 (ref 0–0.05)
IMM GRANULOCYTES NFR BLD AUTO: 3.3 % (ref 0–0.5)
LYMPHOCYTES # BLD AUTO: 0.84 10*3/MM3 (ref 0.7–3.1)
LYMPHOCYTES NFR BLD AUTO: 30.8 % (ref 19.6–45.3)
MCH RBC QN AUTO: 30.9 PG (ref 26.6–33)
MCHC RBC AUTO-ENTMCNC: 33.2 G/DL (ref 31.5–35.7)
MCV RBC AUTO: 93 FL (ref 79–97)
MONOCYTES # BLD AUTO: 0.77 10*3/MM3 (ref 0.1–0.9)
MONOCYTES NFR BLD AUTO: 28.2 % (ref 5–12)
NEUTROPHILS NFR BLD AUTO: 1.03 10*3/MM3 (ref 1.7–7)
NEUTROPHILS NFR BLD AUTO: 37.7 % (ref 42.7–76)
PLATELET # BLD AUTO: 80 10*3/MM3 (ref 140–450)
PMV BLD AUTO: 11.2 FL (ref 6–12)
POTASSIUM SERPL-SCNC: 4.4 MMOL/L (ref 3.5–5.2)
PROT SERPL-MCNC: 7 G/DL (ref 6–8.5)
RBC # BLD AUTO: 3.88 10*6/MM3 (ref 4.14–5.8)
SODIUM SERPL-SCNC: 140 MMOL/L (ref 136–145)
WBC NRBC COR # BLD AUTO: 2.73 10*3/MM3 (ref 3.4–10.8)

## 2025-05-12 PROCEDURE — 99214 OFFICE O/P EST MOD 30 MIN: CPT | Performed by: NURSE PRACTITIONER

## 2025-05-12 PROCEDURE — 36415 COLL VENOUS BLD VENIPUNCTURE: CPT

## 2025-05-12 PROCEDURE — 85025 COMPLETE CBC W/AUTO DIFF WBC: CPT

## 2025-05-12 PROCEDURE — 80053 COMPREHEN METABOLIC PANEL: CPT

## 2025-05-12 NOTE — PROGRESS NOTES
[facility]EMATOLOGY & ONCOLOGY  [address]  [place] [address] [phone]    Patient Name: Gregorio Callahan  Encounter Date: 05/12/2025   YOB: 1960  Patient Number: [id number]     PROGRESS NOTE   HISTORY OF PRESENT ILLNESS: Gregorio Callahan is a 65 y.o. male is followed by this office for MDS.   History is considered to be accurate.    He has health history significant for HTN, CAD, Hyperlipidemia, Hx of MI (2007), Ex Tobacco user:  quit in 2007     He had colonoscopy 3/21/22:   - The entire examined colon is normal on direct and retroflexion views. No specimens collected.  10 year recall.      Drinks beer appox a case per month month    Lost 5 pounds in 6 weeks unintentionally  Reports cough, Night sweats for the past 4  weeks, light headed.    Pt was seen in consultation on 3/7/24.  Ordered CT Neck, Chest, Abdomen, Pelvis     CT Neck 3/15/24  1. No neck mass or evidence of lymphadenopathy. No acute findings.   2. Questionable cerebellar tonsillar ectopia and possible Chiari I malformation, partially obscured by streak artifact. Consider follow-up with MRI of the brain.   CT Chest 3/15/24  1. Innumerable bilateral groundglass and solid pulmonary nodules, some of which appear cavitary. This has a broad differential which would include septic emboli, metastatic disease, atypical infection (fungal mycobacterial) or vasculitis.  2. Mild mediastinal adenopathy.  3. Borderline heart size with heavily calcified coronary arteries versus stent material.   4. Liver contour appears somewhat nodular. There may be mild splenomegaly. Correlate for chronic liver disease.  5. Indeterminate round sclerotic focus in the LEFT posterior fourth rib.  Recommend correlation with serum PSA and consider nuclear medicine bone scan if clinically indicated.    CT Abdomen 3/15/24  No acute findings in the abdomen/pelvis.    Minimal ectasia of the infrarenal  abdominal aorta measuring up to 2.6 cm.    Per radiologist's recommendation, pt went to ER 3/16/24  to rule out septic emboli.  Preliminary blood culture negative.  Weight is stable.  Urgency and headaches have improved.    Still has fatigue and shortness of breath       FISH for MDS negative.  SPEP with no M Ángel.  Flow Cytometry showed increased monocytes 25% of leukocytes. Elevated monocytes could be related to decreased neutrophils at [facility] has been normal .    PET Scan 3/27/24  1.  Innumerable groundglass nodules and surrounding groundglass opacity in the lungs persist and exhibit low-grade for uptake. These may be infectious or neoplastic. These are likely too small for percutaneous biopsy.  2.  Multiple enlarged mediastinal and bilateral hilar lymph nodes are more intensely hypermetabolic. These could potentially be biopsied under EBUS guidance. These may also be infectious/reactive and short interval follow-up could be considered.  3.  Hypermetabolic lymph node at the level of the josie splenic vein confluence.      INTERVAL HISTORY     History of Present Illness             LABS    Lab Results - Last 18 Months   Lab Units 05/12/25  1041 05/05/25  1051 04/28/25  1054 04/21/25  1059 04/11/25  0941 03/31/25  0728 02/14/25  1005 02/04/25  1341 09/16/24  0806 08/06/24  0723 05/30/24  1152 04/23/24  0835 04/08/24  0751 03/19/24  0727 03/16/24  1402 03/07/24  1253   HEMOGLOBIN g/dL 12.0* 12.8* 13.3 12.7*  --  12.3*  --  12.3* 14.0 12.9*   < > 13.2   < > 12.2* 12.3* 12.2*   HEMATOCRIT % 36.1* 37.6 38.3 36.9*  --  36.0*  --  38.1 41.8 39.6   < > 39.6   < > 37.1* 37.5 37.3*   MCV fL 93.0 90.8 90.1 90.9  --  91.8  --  95.0 96.3 96.8   < > 92.3   < > 91.8 93.1 92.1   WBC 10*3/mm3 2.73* 2.56* 3.12* 2.29*  --  2.06*  --  2.63* 3.04* 3.06*   < > 2.33*   < > 3.05* 2.81* 3.64   RDW % 14.6 14.4 14.6 14.6  --  14.6  --  13.9 14.1 14.5   < > 14.9   < > 15.4 14.7 14.7   MPV fL 11.2 11.0 10.7 11.1  --  10.9  --   --   --   --    --  11.0   < > 10.0 10.3 9.8   PLATELETS 10*3/mm3 80* 93* 102* 92*  --  86*  --  107* 109* 120*   < > 91*   < > 141 157 163   IMM GRAN % % 3.3*  --  0.6* 0.4  --  0.5  --   --   --   --   --   --   --   --  0.7*  --    NEUTROS ABS 10*3/mm3 1.03* 0.82* 1.03* 0.70* 0.9* 0.64*  --  1.40* 1.28* 1.37*   < > 0.91*   < > 1.26* 1.46* 1.86   EXTERNAL NEUTROPHILS ABS x10(3)/ul  --   --   --   --   --   --  0.5*  --   --   --   --   --   --   --   --   --    LYMPHS ABS 10*3/mm3 0.84  --  1.10 0.87  --  0.74  --  0.88 CANCELED  0.97 CANCELED  0.99   < >  --    < >  --  0.85  --    MONOS ABS 10*3/mm3 0.77  --  0.96* 0.70  --  0.66  --  0.35 0.73 0.70   < >  --    < >  --  0.46  --    EOS ABS 10*3/mm3 0.00 0.00 0.01 0.00 0 0.00  --   --  CANCELED CANCELED   < >  --    < >  --  0.01 0.07   EOSINOPHIL ABS 10*3/mm3  --   --   --   --   --   --   --   --  0.00  --   --   --   --   --   --   --    EOSINOPHIL % %  --   --   --   --   --   --   --   --  0.0*  --   --   --   --   --   --   --    BASOS ABS 10*3/mm3 0.00 0.00 0.00 0.01 0 0.01  --   --  CANCELED  0.06 CANCELED   < > 0.02   < >  --  0.01  --    IMMATURE GRANS (ABS) 10*3/mm3 0.09*  --  0.02 0.01  --  0.01  --   --   --   --   --   --   --   --  0.02  --    NRBC /100 WBC  --   --   --   --   --   --   --  0.0  --   --   --   --   --   --  0.0 1.0*   NEUTROPHIL % %  --  31.0*  --   --  36.2  --   --  53.1 42.0* 44.8  --  39.0*  --  41.4*  --  46.0   MONOCYTES % %  --  28.0*  --   --  31.6*  --  33* 13.3* 24.0* 22.9*  --  21.0*  --  25.3*  --  26.0*   BASOPHIL % %  --  0.0  --   --  0.3  --  0  --  2.0*  --   --  1.0  --   --   --   --    ATYP LYMPH % %  --  12.0*  --   --   --   --   --   --   --   --   --  11.0*  --  15.2*  --  2.0   ANISOCYTOSIS   --  Slight/1+  --   --  1+  --  1+  --   --   --   --  Slight/1+  --  Slight/1+  --  Slight/1+   GIANT PLT   --   --   --   --   --   --   --   --   --   --   --   --   --   --   --  Slight/1+    < > = values in this interval not  displayed.       Lab Results - Last 18 Months   Lab Units 03/31/25  0728 02/04/25  1341 09/16/24  0806 08/06/24  0723 05/30/24  1152 05/06/24  1258 04/08/24  0751 03/19/24  0727 03/16/24  1402 03/15/24  1626 03/07/24  1253   GLUCOSE mg/dL 93 91 89 87 95 132* 135* 76 125*  --  85   SODIUM mmol/L 137 139 139 144 137 140 141 139 140  --  139   POTASSIUM mmol/L 4.5 3.8 4.3 4.3 3.8 4.0 3.8 4.3 4.1  --  4.6   CO2 mmol/L 25.0 28.3 26.4 25.0 22 26.0 27.0 29.0 28.0  --  27.0   CHLORIDE mmol/L 100 100 102 106 102 105 106 101 105  --  100   ANION GAP mmol/L 12.0  --   --   --   --  9.0 8.0 9.0 7.0  --  12.0   CREATININE mg/dL 0.97 1.15 1.04 1.05 1.06 0.85 0.88 0.82 0.79   < > 0.90   BUN mg/dL 14 17 12 15 18 12 9 11 13  --  13   BUN / CREAT RATIO  14.4 14.8 11.5 14.3 17 14.1 10.2 13.4 16.5  --  14.4   CALCIUM mg/dL 9.0 9.9 9.6 9.4 9.0 9.1 9.1 9.3 9.3  --  9.1   ALK PHOS U/L 82 98 99 91 89  --  88 88 110  --  88   TOTAL PROTEIN g/dL 7.1 7.3 7.2 6.8 7.0  --  6.8 7.2  6.7 7.4  --  7.8   ALT (SGPT) U/L 21 24 22 25 23  --  19 32 35  --  65*   AST (SGOT) U/L 29 32 31 31 34  --  26 28 29  --  52*   BILIRUBIN mg/dL 0.7 0.6 0.7 0.7 0.7  --  0.5 0.5 0.3  --  0.7   ALBUMIN g/dL 4.5 4.7 4.7 4.7 4.7  --  4.4 4.3  3.8 4.4  --  4.4   GLOBULIN gm/dL 2.6  --   --   --   --   --  2.4 2.9 3.0  --  3.4   GLOBULINREF gm/dL  --  2.6 2.5 2.1 2.3  --   --  2.9  --   --   --     < > = values in this interval not displayed.       Lab Results - Last 18 Months   Lab Units 04/08/24  0751 03/19/24  0727 03/07/24  1253   M-SPIKE g/dL  --  Not Observed  --    KAPPA/LAMBDA RATIO, S   --  1.56  --    FREE LAMBDA LIGHT CHAINS mg/L  --  15.8  --    IG KAPPA FREE LIGHT CHAIN mg/L  --  24.7*  --    CEA ng/mL 1.65  --   --    LDH U/L  --  365* 492*   REFERENCE LAB REPORT   --   --  See Attached Report       Lab Results - Last 18 Months   Lab Units 03/31/25  0728 02/04/25  1341 09/16/24  0806 05/30/24  1152 03/19/24  0727 03/07/24  1253 02/01/24  0717   IRON mcg/dL  82  --   --   --  54* 33*  --    TIBC mcg/dL 352  --  356 290 329 288*  --    IRON SATURATION %  --   --  28 26  --   --   --    IRON SATURATION (TSAT) % 23  --   --   --  16* 11*  --    FERRITIN ng/mL 449.90*  --  506.00* 428* 644.80* 1,226.00*  --    TSH uIU/mL  --  1.960  --   --   --   --  2.180   FOLATE ng/mL  --   --   --   --   --  15.10  --          PAST MEDICAL HISTORY:  ALLERGIES:  No Known Allergies  CURRENT MEDICATIONS:  Outpatient Encounter Medications as of 5/12/2025   Medication Sig Dispense Refill    Albuterol-Budesonide (Airsupra) 90-80 MCG/ACT aerosol Inhale 2 puffs 6 (Six) Times a Day. 10.7 g 3    aspirin 81 MG EC tablet Take 1 tablet by mouth Daily.      atorvastatin (LIPITOR) 80 MG tablet Take 1 tablet by mouth Daily. 90 tablet 3    citalopram (CeleXA) 40 MG tablet TAKE 1 TABLET BY MOUTH EVERY DAY 90 tablet 3    ezetimibe (ZETIA) 10 MG tablet Take 1 tablet by mouth Daily for 360 days. 90 tablet 3    levothyroxine (Synthroid) 50 MCG tablet Take 1 tablet by mouth Every Morning. 90 tablet 1    lisinopril (PRINIVIL,ZESTRIL) 5 MG tablet TAKE 1 TABLET BY MOUTH EVERY DAY 90 tablet 3    nitroglycerin (NITROSTAT) 0.4 MG SL tablet TAKE 1 TABLET BY MOUTH EVERY 5 MINUTES UP TO 3 TIMES AS NEEDED FOR CHEST PAIN. 25 tablet 2    traZODone (DESYREL) 50 MG tablet Take 1 tablet by mouth Every Night. 180 tablet 3    vitamin B-12 (CYANOCOBALAMIN) 1000 MCG tablet Take 1 tablet by mouth Daily.       No facility-administered encounter medications on file as of 5/12/2025.     ADULT ILLNESSES:  Patient Active Problem List   Diagnosis Code    Mixed hyperlipidemia E78.2    Benign essential hypertension I10    Coronary artery disease involving native coronary artery of native heart without angina pectoris I25.10    H/O acute myocardial infarction I25.2    History of PTCA Z98.61    SZYMANSKI (dyspnea on exertion) R06.09    Encounter for screening for malignant neoplasm of colon Z12.11    Multiple lung nodules R91.8    Mediastinal  adenopathy R59.0    MDS (myelodysplastic syndrome) D46.9       HEALTH MAINTENANCE ITEMS:  Health Maintenance Due   Topic Date Due    Pneumococcal Vaccine 50+ (3 of 3 - PCV20 or PCV21) 10/23/2023    COVID-19 Vaccine (6 - 2024-25 season) 09/01/2024       <no information>  Last Completed Colonoscopy            Upcoming       COLORECTAL CANCER SCREENING (COLONOSCOPY - Every 10 Years) Next due on 3/21/2032      03/14/2024  Occult Blood X 3, Stool - Stool, Per Rectum    03/21/2022  COLONOSCOPY    03/21/2022  Surgical Procedure: COLONOSCOPY    10/23/2018  Cologuard - Stool, Per Rectum    04/05/2011  COLONOSCOPY (Done)     Only the first 5 history entries have been loaded, but more history exists.                        Immunization History   Administered Date(s) Administered    COVID-19 (MODERNA) 1st,2nd,3rd Dose Monovalent 03/16/2021, 04/13/2021, 11/11/2021    COVID-19 (PFIZER) 12YRS+ (COMIRNATY) 10/19/2023    COVID-19 (PFIZER) BIVALENT 12+YRS 10/25/2022    Flu Vaccine Intradermal Quad 18-64YR 10/18/2017    Flu Vaccine Quad PF >36MO 11/12/2016    Fluad Quad 65+ 08/28/2020    Fluzone  >6mos 10/04/2024    Fluzone (or Fluarix & Flulaval for VFC) >6mos 10/25/2021, 10/25/2022, 10/11/2023    Pneumococcal Conjugate 13-Valent (PCV13) 08/10/2016    Pneumococcal Polysaccharide (PPSV23) 10/23/2018    RSV, unspecified (Vaccine or MAB) 01/08/2025    Shingrix 11/18/2019, 07/16/2020    Tdap 10/18/2017    flucelvax quad pfs =>4 YRS 10/23/2018, 11/12/2019     Last Completed Mammogram    This patient has no relevant Health Maintenance data.           FAMILY HISTORY:  Family History   Problem Relation Age of Onset    Heart disease Mother     Alzheimer's disease Father     Heart attack Sister     Heart attack Brother     No Known Problems Maternal Grandmother     No Known Problems Maternal Grandfather     No Known Problems Paternal Grandmother     No Known Problems Paternal Grandfather     Colon cancer Neg Hx     Colon polyps Neg Hx       SOCIAL HISTORY:  Social History     Socioeconomic History    Marital status:    Tobacco Use    Smoking status: Former     Current packs/day: 0.00     Average packs/day: 3.0 packs/day for 30.0 years (90.0 ttl pk-yrs)     Types: Cigarettes     Start date: 1977     Quit date: 2007     Years since quittin.3     Passive exposure: Past    Smokeless tobacco: Never    Tobacco comments:     age quit smokin   Vaping Use    Vaping status: Never Used   Substance and Sexual Activity    Alcohol use: Yes     Comment: Occasional alcohol use. Drinks beer.    Drug use: No    Sexual activity: Defer       REVIEW OF SYSTEMS:  Review of Systems   Constitutional:  Positive for fatigue and unexpected weight loss (5 pounds in 4 weeks, pt was on vacation and ate welll). Negative for activity change, appetite change, fever and unexpected weight gain.   HENT:  Negative for dental problem, facial swelling, swollen glands and trouble swallowing.    Eyes:  Negative for double vision and discharge.   Respiratory:  Positive for shortness of breath (Chronic but worsening). Negative for cough and wheezing.    Cardiovascular:  Negative for chest pain, palpitations and leg swelling.   Gastrointestinal:  Negative for abdominal pain, blood in stool, nausea and vomiting.   Endocrine: Negative.    Genitourinary:  Positive for urgency (Acute) and urinary incontinence. Negative for dysuria and hematuria.   Musculoskeletal:  Negative for arthralgias and myalgias.   Skin:  Negative for rash, skin lesions and wound.   Allergic/Immunologic: Negative for immunocompromised state.   Neurological:  Positive for headache (intermittent, rapid pain to right side of head). Negative for speech difficulty, light-headedness, memory problem and confusion.   Hematological:  Negative for adenopathy.   Psychiatric/Behavioral:  Negative for dysphoric mood, self-injury, suicidal ideas and depressed mood. The patient is not nervous/anxious.        BP  "118/76   Pulse 63   Temp 97.8 °F (36.6 °C)   Resp 16   Ht 182.9 cm (72\")   Wt 101 kg (221 lb 14.4 oz)   SpO2 96%   BMI 30.10 kg/m²  Body surface area is 2.23 meters squared.    Pain Score    05/12/25 1046   PainSc: 0-No pain              Physical Exam  Constitutional:       Appearance: Normal appearance.   HENT:      Head: Normocephalic and atraumatic.   Cardiovascular:      Rate and Rhythm: Normal rate and regular rhythm.   Pulmonary:      Effort: Pulmonary effort is normal.      Breath sounds: Normal breath sounds.   Abdominal:      General: Bowel sounds are normal.      Palpations: Abdomen is soft.   Musculoskeletal:      Right lower leg: No edema.      Left lower leg: No edema.   Skin:     General: Skin is warm and dry.   Neurological:      Mental Status: He is alert and oriented to person, place, and time.   Psychiatric:         Attention and Perception: Attention normal.         Mood and Affect: Mood normal.         Judgment: Judgment normal.         Gregorio Callahan reports a pain score of 0.  Given his pain assessment as noted, treatment options were discussed and the following options were decided upon as a follow-up plan to address the patient's pain:  no intervention indicated .      ASSESSMENT / PLAN:    1. MDS (myelodysplastic syndrome)    2. Multiple lung nodules on CT        2.  MDS  -Bone marrow 4/23/2024: Findings consistent with myelodysplastic neoplasm with low blast count  Flow cytometry partial CD56 on monocytes  Hematopathology: None necrotizing granuloma  Hypercellular marrow for age with relative myeloid hypoplasia, left shift, adequate megakaryocytes and no increase in blast and a benign lymphocyte aggregate  Storage iron present  Normal FISH for MDS  Cytogenics: Abnormal male karyotype.  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 Goldstein at [facility] who felt pt at low risk and not a candidate for treatment considering his stable cell counts.  When cytopenias progress, can refer pt back to treatment recommendations or consideration for transplant.     -Labs today: WBC 2.73, Hgb 12.0, HCT 36.1, platelets 80, ANC 1.03  -Labs are stable  Decrease lab frequency to q 3 weeks         2.  Lung Nodules on CT Scan   CT Chest Without Contrast Diagnostic (02/28/2025 14:26)    -Follow up with Dr. Fowler 09/15      PLAN:  Stable for continued observation.  Patient aware of neutropenic precautions and signs and symptoms to report  Will decrease frequency of lab draws to every 3 weeks  Patient will return to clinic in 3 months with pre-office labs on the same day  Continue current medications, treatment plans and follow up with PCP and any other providers     Care discussed with patient.  Understanding expressed.  Patient agreeable with plan.         Twila Wick, CHRISTIANA  05/12/2025

## 2025-05-15 ENCOUNTER — TELEPHONE (OUTPATIENT)
Dept: FAMILY MEDICINE CLINIC | Facility: CLINIC | Age: 65
End: 2025-05-15
Payer: COMMERCIAL

## 2025-05-15 DIAGNOSIS — E03.9 ATHYROIDISM (ACQUIRED): Primary | ICD-10-CM

## 2025-05-15 NOTE — TELEPHONE ENCOUNTER
Pt calling about f/u labs--looks like he is due thyroid labs according to 02.04.25 result note--labs drawn in [place] on June 2nd--can someone place future orders and he can get drawn there?

## 2025-06-02 ENCOUNTER — LAB (OUTPATIENT)
Dept: LAB | Facility: HOSPITAL | Age: 65
End: 2025-06-02
Payer: COMMERCIAL

## 2025-06-02 DIAGNOSIS — D61.818 PANCYTOPENIA: ICD-10-CM

## 2025-06-02 DIAGNOSIS — D46.9 MDS (MYELODYSPLASTIC SYNDROME): ICD-10-CM

## 2025-06-02 DIAGNOSIS — E03.9 ATHYROIDISM (ACQUIRED): ICD-10-CM

## 2025-06-02 LAB
BASOPHILS # BLD AUTO: 0 10*3/MM3 (ref 0–0.2)
BASOPHILS NFR BLD AUTO: 0 % (ref 0–1.5)
DEPRECATED RDW RBC AUTO: 50.9 FL (ref 37–54)
EOSINOPHIL # BLD AUTO: 0.01 10*3/MM3 (ref 0–0.4)
EOSINOPHIL NFR BLD AUTO: 0.5 % (ref 0.3–6.2)
ERYTHROCYTE [DISTWIDTH] IN BLOOD BY AUTOMATED COUNT: 15 % (ref 12.3–15.4)
HCT VFR BLD AUTO: 37.1 % (ref 37.5–51)
HGB BLD-MCNC: 12.4 G/DL (ref 13–17.7)
IMM GRANULOCYTES # BLD AUTO: 0.01 10*3/MM3 (ref 0–0.05)
IMM GRANULOCYTES NFR BLD AUTO: 0.5 % (ref 0–0.5)
LYMPHOCYTES # BLD AUTO: 0.81 10*3/MM3 (ref 0.7–3.1)
LYMPHOCYTES NFR BLD AUTO: 38.2 % (ref 19.6–45.3)
MCH RBC QN AUTO: 30.9 PG (ref 26.6–33)
MCHC RBC AUTO-ENTMCNC: 33.4 G/DL (ref 31.5–35.7)
MCV RBC AUTO: 92.5 FL (ref 79–97)
MONOCYTES # BLD AUTO: 0.38 10*3/MM3 (ref 0.1–0.9)
MONOCYTES NFR BLD AUTO: 17.9 % (ref 5–12)
NEUTROPHILS NFR BLD AUTO: 0.91 10*3/MM3 (ref 1.7–7)
NEUTROPHILS NFR BLD AUTO: 42.9 % (ref 42.7–76)
PLATELET # BLD AUTO: 100 10*3/MM3 (ref 140–450)
PMV BLD AUTO: 10.7 FL (ref 6–12)
RBC # BLD AUTO: 4.01 10*6/MM3 (ref 4.14–5.8)
T3FREE SERPL-MCNC: 3.25 PG/ML (ref 2–4.4)
T4 FREE SERPL-MCNC: 0.89 NG/DL (ref 0.92–1.68)
TSH SERPL DL<=0.05 MIU/L-ACNC: 1.15 UIU/ML (ref 0.27–4.2)
WBC NRBC COR # BLD AUTO: 2.12 10*3/MM3 (ref 3.4–10.8)

## 2025-06-02 PROCEDURE — 84481 FREE ASSAY (FT-3): CPT

## 2025-06-02 PROCEDURE — 84443 ASSAY THYROID STIM HORMONE: CPT

## 2025-06-02 PROCEDURE — 84439 ASSAY OF FREE THYROXINE: CPT

## 2025-06-02 PROCEDURE — 36415 COLL VENOUS BLD VENIPUNCTURE: CPT

## 2025-06-02 PROCEDURE — 85025 COMPLETE CBC W/AUTO DIFF WBC: CPT

## 2025-06-23 ENCOUNTER — LAB (OUTPATIENT)
Dept: LAB | Facility: HOSPITAL | Age: 65
End: 2025-06-23
Payer: COMMERCIAL

## 2025-06-23 DIAGNOSIS — D46.9 MDS (MYELODYSPLASTIC SYNDROME): ICD-10-CM

## 2025-06-23 DIAGNOSIS — D61.818 PANCYTOPENIA: ICD-10-CM

## 2025-06-23 LAB
BASOPHILS # BLD AUTO: 0.01 10*3/MM3 (ref 0–0.2)
BASOPHILS NFR BLD AUTO: 0.4 % (ref 0–1.5)
DEPRECATED RDW RBC AUTO: 51.1 FL (ref 37–54)
EOSINOPHIL # BLD AUTO: 0.01 10*3/MM3 (ref 0–0.4)
EOSINOPHIL NFR BLD AUTO: 0.4 % (ref 0.3–6.2)
ERYTHROCYTE [DISTWIDTH] IN BLOOD BY AUTOMATED COUNT: 14.6 % (ref 12.3–15.4)
HCT VFR BLD AUTO: 33.2 % (ref 37.5–51)
HGB BLD-MCNC: 11.1 G/DL (ref 13–17.7)
IMM GRANULOCYTES # BLD AUTO: 0.02 10*3/MM3 (ref 0–0.05)
IMM GRANULOCYTES NFR BLD AUTO: 0.7 % (ref 0–0.5)
LYMPHOCYTES # BLD AUTO: 0.89 10*3/MM3 (ref 0.7–3.1)
LYMPHOCYTES NFR BLD AUTO: 32.4 % (ref 19.6–45.3)
MCH RBC QN AUTO: 31.3 PG (ref 26.6–33)
MCHC RBC AUTO-ENTMCNC: 33.4 G/DL (ref 31.5–35.7)
MCV RBC AUTO: 93.5 FL (ref 79–97)
MONOCYTES # BLD AUTO: 0.86 10*3/MM3 (ref 0.1–0.9)
MONOCYTES NFR BLD AUTO: 31.3 % (ref 5–12)
NEUTROPHILS NFR BLD AUTO: 0.96 10*3/MM3 (ref 1.7–7)
NEUTROPHILS NFR BLD AUTO: 34.8 % (ref 42.7–76)
PLATELET # BLD AUTO: 81 10*3/MM3 (ref 140–450)
PMV BLD AUTO: 11 FL (ref 6–12)
RBC # BLD AUTO: 3.55 10*6/MM3 (ref 4.14–5.8)
WBC NRBC COR # BLD AUTO: 2.75 10*3/MM3 (ref 3.4–10.8)

## 2025-06-23 PROCEDURE — 36415 COLL VENOUS BLD VENIPUNCTURE: CPT

## 2025-06-23 PROCEDURE — 85025 COMPLETE CBC W/AUTO DIFF WBC: CPT

## 2025-07-14 ENCOUNTER — LAB (OUTPATIENT)
Dept: LAB | Facility: HOSPITAL | Age: 65
End: 2025-07-14
Payer: COMMERCIAL

## 2025-07-14 DIAGNOSIS — D46.9 MDS (MYELODYSPLASTIC SYNDROME): ICD-10-CM

## 2025-07-14 DIAGNOSIS — D61.818 PANCYTOPENIA: ICD-10-CM

## 2025-07-14 LAB
BASOPHILS # BLD AUTO: 0.01 10*3/MM3 (ref 0–0.2)
BASOPHILS NFR BLD AUTO: 0.4 % (ref 0–1.5)
DEPRECATED RDW RBC AUTO: 51.8 FL (ref 37–54)
EOSINOPHIL # BLD AUTO: 0 10*3/MM3 (ref 0–0.4)
EOSINOPHIL NFR BLD AUTO: 0 % (ref 0.3–6.2)
ERYTHROCYTE [DISTWIDTH] IN BLOOD BY AUTOMATED COUNT: 15 % (ref 12.3–15.4)
HCT VFR BLD AUTO: 37.4 % (ref 37.5–51)
HGB BLD-MCNC: 12.4 G/DL (ref 13–17.7)
IMM GRANULOCYTES # BLD AUTO: 0.03 10*3/MM3 (ref 0–0.05)
IMM GRANULOCYTES NFR BLD AUTO: 1.3 % (ref 0–0.5)
LYMPHOCYTES # BLD AUTO: 0.8 10*3/MM3 (ref 0.7–3.1)
LYMPHOCYTES NFR BLD AUTO: 35.6 % (ref 19.6–45.3)
MCH RBC QN AUTO: 31.2 PG (ref 26.6–33)
MCHC RBC AUTO-ENTMCNC: 33.2 G/DL (ref 31.5–35.7)
MCV RBC AUTO: 94 FL (ref 79–97)
MONOCYTES # BLD AUTO: 0.43 10*3/MM3 (ref 0.1–0.9)
MONOCYTES NFR BLD AUTO: 19.1 % (ref 5–12)
NEUTROPHILS NFR BLD AUTO: 0.98 10*3/MM3 (ref 1.7–7)
NEUTROPHILS NFR BLD AUTO: 43.6 % (ref 42.7–76)
PLATELET # BLD AUTO: 100 10*3/MM3 (ref 140–450)
PMV BLD AUTO: 11 FL (ref 6–12)
RBC # BLD AUTO: 3.98 10*6/MM3 (ref 4.14–5.8)
WBC NRBC COR # BLD AUTO: 2.25 10*3/MM3 (ref 3.4–10.8)

## 2025-07-14 PROCEDURE — 85025 COMPLETE CBC W/AUTO DIFF WBC: CPT

## 2025-07-14 PROCEDURE — 36415 COLL VENOUS BLD VENIPUNCTURE: CPT

## 2025-07-30 DIAGNOSIS — E03.9 ATHYROIDISM (ACQUIRED): ICD-10-CM

## 2025-07-30 RX ORDER — LEVOTHYROXINE SODIUM 50 UG/1
50 TABLET ORAL
Qty: 90 TABLET | Refills: 1 | Status: SHIPPED | OUTPATIENT
Start: 2025-07-30

## 2025-07-30 NOTE — TELEPHONE ENCOUNTER
Rx Refill Note  Requested Prescriptions     Pending Prescriptions Disp Refills    levothyroxine (Synthroid) 50 MCG tablet 90 tablet 1     Sig: Take 1 tablet by mouth Every Morning.      Last office visit with prescribing clinician: 2/4/2025   Next office visit with prescribing clinician: 8/5/2025       Christina Smyth MA  07/30/25, 13:21 CDT

## 2025-08-04 ENCOUNTER — LAB (OUTPATIENT)
Dept: LAB | Facility: HOSPITAL | Age: 65
End: 2025-08-04
Payer: COMMERCIAL

## 2025-08-04 DIAGNOSIS — D46.9 MDS (MYELODYSPLASTIC SYNDROME): ICD-10-CM

## 2025-08-04 LAB
ALBUMIN SERPL-MCNC: 4.4 G/DL (ref 3.5–5.2)
ALBUMIN/GLOB SERPL: 1.9 G/DL
ALP SERPL-CCNC: 91 U/L (ref 39–117)
ALT SERPL W P-5'-P-CCNC: 20 U/L (ref 1–41)
ANION GAP SERPL CALCULATED.3IONS-SCNC: 11 MMOL/L (ref 5–15)
AST SERPL-CCNC: 29 U/L (ref 1–40)
BASOPHILS # BLD AUTO: 0 10*3/MM3 (ref 0–0.2)
BASOPHILS NFR BLD AUTO: 0 % (ref 0–1.5)
BILIRUB SERPL-MCNC: 0.5 MG/DL (ref 0–1.2)
BUN SERPL-MCNC: 10.9 MG/DL (ref 8–23)
BUN/CREAT SERPL: 10.5 (ref 7–25)
CALCIUM SPEC-SCNC: 8.6 MG/DL (ref 8.6–10.5)
CHLORIDE SERPL-SCNC: 105 MMOL/L (ref 98–107)
CO2 SERPL-SCNC: 24 MMOL/L (ref 22–29)
CREAT SERPL-MCNC: 1.04 MG/DL (ref 0.76–1.27)
DEPRECATED RDW RBC AUTO: 51 FL (ref 37–54)
EGFRCR SERPLBLD CKD-EPI 2021: 80.2 ML/MIN/1.73
EOSINOPHIL # BLD AUTO: 0 10*3/MM3 (ref 0–0.4)
EOSINOPHIL NFR BLD AUTO: 0 % (ref 0.3–6.2)
ERYTHROCYTE [DISTWIDTH] IN BLOOD BY AUTOMATED COUNT: 14.7 % (ref 12.3–15.4)
FERRITIN SERPL-MCNC: 447.4 NG/ML (ref 30–400)
GLOBULIN UR ELPH-MCNC: 2.3 GM/DL
GLUCOSE SERPL-MCNC: 89 MG/DL (ref 65–99)
HCT VFR BLD AUTO: 36 % (ref 37.5–51)
HGB BLD-MCNC: 12 G/DL (ref 13–17.7)
IMM GRANULOCYTES # BLD AUTO: 0.03 10*3/MM3 (ref 0–0.05)
IMM GRANULOCYTES NFR BLD AUTO: 1.3 % (ref 0–0.5)
IRON 24H UR-MRATE: 66 MCG/DL (ref 59–158)
IRON SATN MFR SERPL: 19 % (ref 20–50)
LYMPHOCYTES # BLD AUTO: 0.83 10*3/MM3 (ref 0.7–3.1)
LYMPHOCYTES NFR BLD AUTO: 35.3 % (ref 19.6–45.3)
MCH RBC QN AUTO: 31.3 PG (ref 26.6–33)
MCHC RBC AUTO-ENTMCNC: 33.3 G/DL (ref 31.5–35.7)
MCV RBC AUTO: 94 FL (ref 79–97)
MONOCYTES # BLD AUTO: 0.74 10*3/MM3 (ref 0.1–0.9)
MONOCYTES NFR BLD AUTO: 31.5 % (ref 5–12)
NEUTROPHILS NFR BLD AUTO: 0.75 10*3/MM3 (ref 1.7–7)
NEUTROPHILS NFR BLD AUTO: 31.9 % (ref 42.7–76)
PLATELET # BLD AUTO: 72 10*3/MM3 (ref 140–450)
PMV BLD AUTO: 11.3 FL (ref 6–12)
POTASSIUM SERPL-SCNC: 4.1 MMOL/L (ref 3.5–5.2)
PROT SERPL-MCNC: 6.7 G/DL (ref 6–8.5)
RBC # BLD AUTO: 3.83 10*6/MM3 (ref 4.14–5.8)
SODIUM SERPL-SCNC: 140 MMOL/L (ref 136–145)
TIBC SERPL-MCNC: 347 MCG/DL (ref 298–536)
TRANSFERRIN SERPL-MCNC: 233 MG/DL (ref 200–360)
WBC NRBC COR # BLD AUTO: 2.35 10*3/MM3 (ref 3.4–10.8)

## 2025-08-04 PROCEDURE — 80053 COMPREHEN METABOLIC PANEL: CPT

## 2025-08-04 PROCEDURE — 36415 COLL VENOUS BLD VENIPUNCTURE: CPT

## 2025-08-04 PROCEDURE — 82728 ASSAY OF FERRITIN: CPT

## 2025-08-04 PROCEDURE — 83540 ASSAY OF IRON: CPT

## 2025-08-04 PROCEDURE — 85025 COMPLETE CBC W/AUTO DIFF WBC: CPT

## 2025-08-04 PROCEDURE — 84466 ASSAY OF TRANSFERRIN: CPT

## 2025-08-05 ENCOUNTER — OFFICE VISIT (OUTPATIENT)
Dept: FAMILY MEDICINE CLINIC | Facility: CLINIC | Age: 65
End: 2025-08-05
Payer: COMMERCIAL

## 2025-08-05 VITALS
DIASTOLIC BLOOD PRESSURE: 88 MMHG | RESPIRATION RATE: 18 BRPM | HEART RATE: 78 BPM | SYSTOLIC BLOOD PRESSURE: 130 MMHG | OXYGEN SATURATION: 94 % | BODY MASS INDEX: 29.85 KG/M2 | WEIGHT: 220.4 LBS | HEIGHT: 72 IN

## 2025-08-05 DIAGNOSIS — D72.819 LEUKOPENIA, UNSPECIFIED TYPE: ICD-10-CM

## 2025-08-05 DIAGNOSIS — R53.83 OTHER FATIGUE: Primary | ICD-10-CM

## 2025-08-05 DIAGNOSIS — R91.1 PULMONARY NODULE: ICD-10-CM

## 2025-08-05 DIAGNOSIS — E78.00 PURE HYPERCHOLESTEROLEMIA: ICD-10-CM

## 2025-08-05 PROCEDURE — 99213 OFFICE O/P EST LOW 20 MIN: CPT | Performed by: FAMILY MEDICINE

## 2025-08-05 RX ORDER — CAPTOPRIL 100 MG/1
100 TABLET ORAL DAILY
COMMUNITY
End: 2025-08-05 | Stop reason: ALTCHOICE

## 2025-08-09 LAB
CHOLEST SERPL-MCNC: 112 MG/DL (ref 0–200)
HDLC SERPL-MCNC: 37 MG/DL (ref 40–60)
LDLC SERPL CALC-MCNC: 53 MG/DL (ref 0–100)
TRIGL SERPL-MCNC: 123 MG/DL (ref 0–150)
VLDLC SERPL CALC-MCNC: 22 MG/DL (ref 5–40)

## (undated) DEVICE — TBG PRESS EXT

## (undated) DEVICE — Device: Brand: DEFENDO AIR/WATER/SUCTION AND BIOPSY VALVE

## (undated) DEVICE — Device: Brand: BALLOON

## (undated) DEVICE — CUFF,BP,DISP,1 TUBE,ADULT,HP: Brand: MEDLINE

## (undated) DEVICE — THE CHANNEL CLEANING BRUSH IS A NYLON FLEXI BRUSH ATTACHED TO A FLEXIBLE PLASTIC SHEATH DESIGNED TO SAFELY REMOVE DEBRIS FROM FLEXIBLE ENDOSCOPES.

## (undated) DEVICE — SENSR O2 OXIMAX FNGR A/ 18IN NONSTR

## (undated) DEVICE — TRAP,MUCUS SPECIMEN, 80CC: Brand: MEDLINE

## (undated) DEVICE — VISION PROBE ADAPTER AND SUCTION ADAPTER

## (undated) DEVICE — TBG SMPL FLTR LINE NASL 02/C02 A/ BX/100

## (undated) DEVICE — MASK,OXYGEN,MED CONC,ADLT,7' TUB, UC: Brand: PENDING

## (undated) DEVICE — ST NDL BRONCH ASP VIZISHOT 2 FLX 22G

## (undated) DEVICE — SWIVEL CONNECTOR

## (undated) DEVICE — SINGLE USE BIOPSY VALVE MAJ-210: Brand: SINGLE USE BIOPSY VALVE (STERILE)

## (undated) DEVICE — SINGLE USE SUCTION VALVE MAJ-209: Brand: SINGLE USE SUCTION VALVE (STERILE)

## (undated) DEVICE — ADAPT SWVL FIBROPTIC BRONCH

## (undated) DEVICE — YANKAUER,BULB TIP WITH VENT: Brand: ARGYLE

## (undated) DEVICE — BIOPSY NEEDLE, 23G: Brand: FLEXISION

## (undated) DEVICE — Device: Brand: ION